# Patient Record
Sex: FEMALE | Race: ASIAN | NOT HISPANIC OR LATINO | Employment: FULL TIME | URBAN - METROPOLITAN AREA
[De-identification: names, ages, dates, MRNs, and addresses within clinical notes are randomized per-mention and may not be internally consistent; named-entity substitution may affect disease eponyms.]

---

## 2017-03-01 ENCOUNTER — GENERIC CONVERSION - ENCOUNTER (OUTPATIENT)
Dept: OTHER | Facility: OTHER | Age: 47
End: 2017-03-01

## 2017-05-03 ENCOUNTER — ALLSCRIPTS OFFICE VISIT (OUTPATIENT)
Dept: OTHER | Facility: OTHER | Age: 47
End: 2017-05-03

## 2017-05-03 DIAGNOSIS — R92.2 INCONCLUSIVE MAMMOGRAM: ICD-10-CM

## 2017-05-03 DIAGNOSIS — Z12.31 ENCOUNTER FOR SCREENING MAMMOGRAM FOR MALIGNANT NEOPLASM OF BREAST: ICD-10-CM

## 2017-05-13 ENCOUNTER — GENERIC CONVERSION - ENCOUNTER (OUTPATIENT)
Dept: OTHER | Facility: OTHER | Age: 47
End: 2017-05-13

## 2017-05-14 LAB
A/G RATIO (HISTORICAL): 1.7 (ref 1.2–2.2)
ALBUMIN SERPL BCP-MCNC: 4.3 G/DL (ref 3.5–5.5)
ALP SERPL-CCNC: 49 IU/L (ref 39–117)
ALT SERPL W P-5'-P-CCNC: 14 IU/L (ref 0–32)
AST SERPL W P-5'-P-CCNC: 14 IU/L (ref 0–40)
BASOPHILS # BLD AUTO: 0.1 X10E3/UL (ref 0–0.2)
BASOPHILS # BLD AUTO: 2 %
BILIRUB SERPL-MCNC: 0.5 MG/DL (ref 0–1.2)
BUN SERPL-MCNC: 15 MG/DL (ref 6–24)
BUN/CREA RATIO (HISTORICAL): 22 (ref 9–23)
CALCIUM SERPL-MCNC: 8.8 MG/DL (ref 8.7–10.2)
CHLORIDE SERPL-SCNC: 103 MMOL/L (ref 96–106)
CHOLEST SERPL-MCNC: 170 MG/DL (ref 100–199)
CO2 SERPL-SCNC: 22 MMOL/L (ref 18–29)
CREAT SERPL-MCNC: 0.67 MG/DL (ref 0.57–1)
DEPRECATED RDW RBC AUTO: 13.6 % (ref 12.3–15.4)
EGFR AFRICAN AMERICAN (HISTORICAL): 122 ML/MIN/1.73
EGFR-AMERICAN CALC (HISTORICAL): 106 ML/MIN/1.73
EOSINOPHIL # BLD AUTO: 0.3 X10E3/UL (ref 0–0.4)
EOSINOPHIL # BLD AUTO: 6 %
GLUCOSE SERPL-MCNC: 104 MG/DL (ref 65–99)
HBA1C MFR BLD HPLC: 6.1 % (ref 4.8–5.6)
HCT VFR BLD AUTO: 39.8 % (ref 34–46.6)
HDLC SERPL-MCNC: 78 MG/DL
HGB BLD-MCNC: 13.1 G/DL (ref 11.1–15.9)
IMM.GRANULOCYTES (CD4/8) (HISTORICAL): 0 %
IMM.GRANULOCYTES (CD4/8) (HISTORICAL): 0 X10E3/UL (ref 0–0.1)
LDLC SERPL CALC-MCNC: 80 MG/DL (ref 0–99)
LYMPHOCYTES # BLD AUTO: 1.8 X10E3/UL (ref 0.7–3.1)
LYMPHOCYTES # BLD AUTO: 39 %
MCH RBC QN AUTO: 30.8 PG (ref 26.6–33)
MCHC RBC AUTO-ENTMCNC: 32.9 G/DL (ref 31.5–35.7)
MCV RBC AUTO: 94 FL (ref 79–97)
MONOCYTES # BLD AUTO: 0.3 X10E3/UL (ref 0.1–0.9)
MONOCYTES (HISTORICAL): 6 %
NEUTROPHILS # BLD AUTO: 2.2 X10E3/UL (ref 1.4–7)
NEUTROPHILS # BLD AUTO: 47 %
PLATELET # BLD AUTO: 264 X10E3/UL (ref 150–379)
POTASSIUM SERPL-SCNC: 3.9 MMOL/L (ref 3.5–5.2)
RBC (HISTORICAL): 4.25 X10E6/UL (ref 3.77–5.28)
SODIUM SERPL-SCNC: 141 MMOL/L (ref 134–144)
TOT. GLOBULIN, SERUM (HISTORICAL): 2.5 G/DL (ref 1.5–4.5)
TOTAL PROTEIN (HISTORICAL): 6.8 G/DL (ref 6–8.5)
TRIGL SERPL-MCNC: 61 MG/DL (ref 0–149)
TSH SERPL DL<=0.05 MIU/L-ACNC: 2.2 UIU/ML (ref 0.45–4.5)
WBC # BLD AUTO: 4.7 X10E3/UL (ref 3.4–10.8)

## 2017-05-15 LAB
25(OH)D3 SERPL-MCNC: 24.2 NG/ML (ref 30–100)
INTERPRETATION (HISTORICAL): NORMAL

## 2017-05-16 ENCOUNTER — GENERIC CONVERSION - ENCOUNTER (OUTPATIENT)
Dept: OTHER | Facility: OTHER | Age: 47
End: 2017-05-16

## 2017-05-16 ENCOUNTER — HOSPITAL ENCOUNTER (OUTPATIENT)
Dept: RADIOLOGY | Facility: HOSPITAL | Age: 47
Discharge: HOME | End: 2017-05-16
Payer: COMMERCIAL

## 2017-05-16 DIAGNOSIS — R92.2 INCONCLUSIVE MAMMOGRAM: ICD-10-CM

## 2017-05-16 DIAGNOSIS — Z12.31 ENCOUNTER FOR SCREENING MAMMOGRAM FOR MALIGNANT NEOPLASM OF BREAST: ICD-10-CM

## 2017-05-16 PROCEDURE — G0202 SCR MAMMO BI INCL CAD: HCPCS

## 2017-05-16 PROCEDURE — 77063 BREAST TOMOSYNTHESIS BI: CPT

## 2017-05-17 ENCOUNTER — GENERIC CONVERSION - ENCOUNTER (OUTPATIENT)
Dept: OTHER | Facility: OTHER | Age: 47
End: 2017-05-17

## 2018-01-14 VITALS
WEIGHT: 111 LBS | TEMPERATURE: 98 F | DIASTOLIC BLOOD PRESSURE: 64 MMHG | HEIGHT: 60 IN | SYSTOLIC BLOOD PRESSURE: 120 MMHG | HEART RATE: 78 BPM | RESPIRATION RATE: 18 BRPM | BODY MASS INDEX: 21.79 KG/M2

## 2018-01-15 NOTE — RESULT NOTES
Discussion/Summary   Didier Crespo,   Your Vitamin D level is still low, but better than last time  I recommend starting Vitamin D3 2000 units a day  Fasting sugar and hemoglobin A1c are still in the prediabetic range  Kidney function, liver enzymes, blood count and cholesterol are normal    Dr Deb Quispe      Verified Results  (1) VITAMIN D 25-HYDROXY [id number] 07:55AM Vivian [place]     Test Name Result Flag Reference   Vitamin D, 25-Hydroxy 24 2 ng/mL L 30 0-100 0   Vitamin D deficiency has been defined by the [address] practice guideline as a  level of serum 25-OH vitamin D less than 20 ng/mL (1,2)  The Endocrine Society went on to further define vitamin D  insufficiency as a level between 21 and 29 ng/mL (2)  1  IOM ([place] [employer])  2010  Dietary reference     intakes for calcium and D  [facility]: The     [facility]  2  Kathie MF, Linda BEST, Katie KAPOOR, et al      Evaluation, treatment, and prevention of vitamin D     deficiency: an Endocrine Society clinical practice     guideline  JCEM  2011 Jul; 96(7):1911-30       (1) HEMOGLOBIN A1C [id number] 07:55AM Vivian [place]     Test Name Result Flag Reference   Hemoglobin A1c 6 1 % H 4 8-5 6   Pre-diabetes: 5 7 - 6 4           Diabetes: >6 4           Glycemic control for adults with diabetes: <7 0     (1) COMPREHENSIVE METABOLIC PANEL [id number] 83:58QL Vivian [place]     Test Name Result Flag Reference   Glucose, Serum 104 mg/dL H 65-99   BUN 15 mg/dL  6-24   Creatinine, Serum 0 67 mg/dL  0 57-1 00   BUN/Creatinine Ratio 22  9-23   Sodium, Serum 141 mmol/L  134-144   Potassium, Serum 3 9 mmol/L  3 5-5 2   Chloride, Serum 103 mmol/L     Carbon Dioxide, Total 22 mmol/L  18-29   Calcium, Serum 8 8 mg/dL  8 7-10 2   Protein, Total, Serum 6 8 g/dL  6 0-8 5   Albumin, Serum 4 3 g/dL  3 5-5 5   Globulin, Total 2 5 g/dL  1 5-4 5   A/G Ratio 1 7  1 2-2 2   Bilirubin, Total 0 5 mg/dL  0 0-1 2   Alkaline Phosphatase, S 49 IU/L     AST (SGOT) 14 IU/L  0-40   ALT (SGPT) 14 IU/L  0-32   eGFR If NonAfricn Am 106 mL/min/1 73  >59   eGFR If Africn Am 122 mL/min/1 73  >59     (1) LIPID PANEL FASTING W DIRECT LDL REFLEX [id number] 07:55AM [facility]     Test Name Result Flag Reference   Cholesterol, Total 170 mg/dL  100-199   Triglycerides 61 mg/dL  0-149   HDL Cholesterol 78 mg/dL  >39   LDL Cholesterol Calc 80 mg/dL  0-99     (1) TSH [id number] 07:55AM [facility]     Test Name Result Flag Reference   TSH 2 200 uIU/mL  0 450-4 500     (1) CBC/PLT/DIFF [id number] 07:55AM [facility]     Test Name Result Flag Reference   WBC 4 7 x10E3/uL  3 4-10 8   RBC 4 25 x10E6/uL  3 77-5 28   Hemoglobin 13 1 g/dL  11 1-15 9   Hematocrit 39 8 %  34 0-46  6   MCV 94 fL  79-97   MCH 30 8 pg  26 6-33 0   MCHC 32 9 g/dL  31 5-35 7   RDW 13 6 %  12 3-15 4   Platelets 696 H35U0/VU  150-379   Neutrophils 47 %     Lymphs 39 %     Monocytes 6 %     Eos 6 %     Basos 2 %     Neutrophils (Absolute) 2 2 x10E3/uL  1 4-7 0   Lymphs (Absolute) 1 8 x10E3/uL  0 7-3 1   Monocytes(Absolute) 0 3 x10E3/uL  0 1-0 9   Eos (Absolute) 0 3 x10E3/uL  0 0-0 4   Baso (Absolute) 0 1 x10E3/uL  0 0-0 2   Immature Granulocytes 0 %     Immature Grans (Abs) 0 0 x10E3/uL  0 0-0 1     (LC) Cardiovascular Risk Assessment [id number] 07:55AM [facility]     Test Name Result Flag Reference   Interpretation Note     Supplement report is available  PDF Image

## 2018-01-15 NOTE — RESULT NOTES
Discussion/Summary   Minerva Denver,   Your mammogram is normal    Dr Nasreen Palencia [id number] 02:55PM Peg Zamora Order Number: [id number]    - Patient Instructions: To schedule this appointment, please contact Central Scheduling at [phone]  Do not wear any perfume, powder, lotion or deodorant on breast or underarm area  Please bring your doctors order, referral (if needed) and insurance information with you on the day of the test  Failure to bring this information may result in this test being rescheduled  Arrive 15 minutes prior to your appointment time to register  On the day of your test, please bring any prior mammogram or breast studies with you that were not performed at a [facility]  Failure to bring prior exams may result in your test needing to be rescheduled  Test Name Result Flag Reference   MAMMO SCREENING BILATERAL W 3D & CAD (Report)     Patient History:   Patient is nulliparous  Family history of prostate cancer at age 48 or over in father  Patient has never smoked  Patient's BMI is 21 0  Reason for exam: screening, asymptomatic  Screening     Mammo Screening Bilateral W DBT and CAD: May 16, 2017 - Check In    #: [de-identified]   2D/3D Procedure   3D Bilateral CC and MLO view(s) were taken  2D Bilateral CC and MLO view(s) were taken  Technologist: MAXIMINO Harding   Prior study comparison: December 30, 2014, bilateral screening    mammogram performed at [facility]  The breast tissue is heterogeneously dense, potentially limiting    the sensitivity of mammography  Patient risk, included in this    report, assists in determining the appropriate screening regimen    (such as 3-D mammography or the inclusion of automated breast    ultrasound or MRI)  3-D mammography may also remain indicated as    screening   No dominant soft tissue mass, architectural    distortion or suspicious calcifications are noted  The skin and    nipple contours are within normal limits  No evidence of    malignancy  No significant change when compared to the prior    studies  1  No evidence of malignancy  2  No significant change when compared with the prior study  ACR BI-RADSï¾® Assessments: BiRad:1 - Negative     Recommendation:   Routine screening mammogram of both breasts in 1 year  A reminder letter will be scheduled   8-10% of cancers will be missed on mammography  Management of a    palpable abnormality must be based on clinical grounds  Patients    will be notified of their results via letter from our facility  Accredited by [employer] of [employer] and FDA       Transcription Location: [facility] 98: [id number]     Risk Value(s):   Tyrer-Cuzick 10 Year: 2 300%, Tyrer-Cuzick Lifetime: 11 700%,    Myriad Table: 1 5%, SARAH 5 Year: 0 9%, NCI Lifetime: 9 6%   Signed by:   Barb Daly MD   5/17/17

## 2018-01-23 ENCOUNTER — ALLSCRIPTS OFFICE VISIT (OUTPATIENT)
Dept: OTHER | Facility: OTHER | Age: 48
End: 2018-01-23

## 2018-01-23 DIAGNOSIS — D25.9 LEIOMYOMA OF UTERUS: ICD-10-CM

## 2018-01-23 PROCEDURE — 87624 HPV HI-RISK TYP POOLED RSLT: CPT | Performed by: OBSTETRICS & GYNECOLOGY

## 2018-01-23 PROCEDURE — 88175 CYTOPATH C/V AUTO FLUID REDO: CPT | Performed by: OBSTETRICS & GYNECOLOGY

## 2018-01-24 NOTE — PROGRESS NOTES
Assessment   1  Encounter for gynecological examination with Papanicolaou smear of cervix (V72 31)     (Z01 419)   2  Fibroid, uterine (218 9) (D25 9)    Discussion/Summary      1  Pap smear done with HPV Encouraged self-breast examination as well as calcium supplementation Recommend annual 3D mammogram given breast density Recommend pelvic ultrasound to assess fibroid uterus Reviewed claudia menopausal symptoms  She will keep a menstrual diary Return to office in 1 year  I will notify her the above results via telephone  The patient has the current Goals: Continue preventative screening, monitor menstrual cycles  The patent has the current Barriers: No barriers  Chief Complaint   annual visit      History of Present Illness   HPI: This is a 80-year-old female G0 who presents as a new patient for a nnual well gyn exam  She states her last gyn exam was a couple of years ago  She states all her Pap smears have been normal  Her menstrual cycles have been regular every 4 weeks lasting 5-6 days up until October  She states that she had skipped November and December and then had a normal period in January  She denies any hot flashes or night sweats  She denies any changes in bowel or bladder function  She did have an episode of irregular bleeding in July 2017  She was visiting in [place] for 1 month and underwent an evaluation  She had a pelvic ultrasound done which revealed a multi fibroid uterus, largest 4 x 3 cm  All intramural fibroids  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  has not been sexually active for several years  She states all her Pap smears have been normal  She does follow up with her primary care physician on a regular basis  Her hemoglobin A1c is 6 2  She states there is a strong family history of diabetes        Review of Systems        Cardiovascular: no complaints of slow or fast heart rate, no chest pain, no palpitations, no leg claudication or lower extremity edema  Respiratory: no complaints of shortness of breath, no wheezing, no dyspnea on exertion, no orthopnea or PND  Breasts: no complaints of breast pain, breast lump or nipple discharge  Gastrointestinal: no complaints of abdominal pain, no constipation, no nausea or diarrhea, no vomiting, no bloody stools  Genitourinary: no complaints of dysuria, no incontinence, no pelvic pain, no dysmenorrhea, no vaginal discharge or abnormal vaginal bleeding-- and-- as noted in HPI  Over the past 2 weeks, how often have you been bothered by the following problems? 1 ) Little interest or pleasure in doing things? Not at all       2 ) Feeling down, depressed or hopeless? Not at all       3 ) Trouble falling asleep or sleeping too much? Not at all       4 ) Feeling tired or having little energy? Several days  5 ) Poor appetite or overeating? Not at all       6 ) Feeling bad about yourself, or that you are a failure, or have let yourself or your family down? Not at all       7 ) Trouble concentrating on things, such as reading a newspaper or watching television? Not at all       8 ) Moving or speaking so slowly that other people could have noticed, or the opposite, moving or speaking faster than usual? Not at all  How difficult have these problems made it for you to do your work, take care of things at home, or get along with people? Not at all  Score 1       ROS reviewed  OB History   Pregnancy History (Brief):      Prior pregnancies: : 0  Para: Active Problems   1  Allergic rhinitis due to pollen (477 0) (J30 1)   2  Body mass index (BMI) of 21 0 to 21 9 in adult (V85 1) (Z68 21)   3  Bulging lumbar disc (722 10) (M51 26)   4  Dense breasts (793 82) (R92 2)   5  Encounter for gynecological examination with Papanicolaou smear of cervix (V72 31)     (Z01 419)   6  Esophageal reflux (530 81) (K21 9)   7  Fatigue (780 79) (R53 83)   8   Impaired fasting glucose (790 21) (R73 01)   9  Positive PPD (795 51) (R76 11)   10  Screening for cardiovascular condition (V81 2) (Z13 6)   11  Screening for thyroid disorder (V77 0) (Z13 29)   12  Screening mammogram, encounter for (V76 12) (Z12 31)   13  Vitamin D deficiency (268 9) (E55 9)    Past Medical History    · History of Cough (786 2) (R05)   · History of acute sinusitis (V12 69) (Z87 09)   · History of backache (V13 59) (Z87 39)   · History of folliculitis (Y73 6) (Z47 8)   · History of viral infection (V12 09) (Z86 19)   · History of Screening for diabetes mellitus (V77 1) (Z13 1)   · History of Sore throat (462) (J02 9)     The active problems and past medical history were reviewed and updated today  Surgical History    · History of Dental Surgery     The surgical history was reviewed and updated today  Family History   Mother    · Family history of Benign essential hypertension (401 1) (I10)   · Family history of Diabetes Mellitus (V18 0)  Father    · Family history of Benign Essential Hypertension   · Family history of Diabetes Mellitus (V18 0)   · Family history of Liver Disorders In Diseases Classified Elsewhere   · Family history of Tuberculosis     The family history was reviewed and updated today  Social History    · Alcohol Use (History)   ·    · Never a smoker   · Occupation   · Nurse anesthetist, [employer]  The social history was reviewed and updated today  Current Meds    1  Daily Value Multivitamin TABS; Take 1 tablet daily Recorded    Allergies   1  No Known Drug Allergies    Vitals    Recorded: 21DLT9644 07:52KJ   Systolic 222   Diastolic 70   Height 5 ft    Weight 114 lb    BMI Calculated 22 26   BSA Calculated 1 47   LMP [id number]     Physical Exam        Constitutional      General appearance: No acute distress, well appearing and well nourished  Pulmonary      Auscultation of lungs: Clear to auscultation         Cardiovascular      Auscultation of heart: Normal rate and rhythm, normal S1 and S2, no murmurs  Genitourinary      External genitalia: Normal and no lesions appreciated  Vagina: Normal, no lesions or dryness appreciated  Urethral meatus: Normal        Cervix: Normal, no palpable masses  Uterus: Abnormal  -- Multi fibroid uterus, irregular in contour, approximately 13 week size uterus deviating to the right adnexa  Adnexa/parametria: Normal, non-tender and no fullness or masses appreciated  Chest      Breasts: Normal and no dimpling or skin changes noted  Abdomen      Abdomen: Normal, non-tender, and no organomegaly noted         Signatures    Electronically signed by : Norma Glasgow DO; Jan 23 2018  4:16PM EST                       (Author)

## 2018-01-26 ENCOUNTER — LAB REQUISITION (OUTPATIENT)
Dept: LAB | Facility: HOSPITAL | Age: 48
End: 2018-01-26
Payer: COMMERCIAL

## 2018-01-26 DIAGNOSIS — Z01.419 ENCOUNTER FOR GYNECOLOGICAL EXAMINATION WITHOUT ABNORMAL FINDING: ICD-10-CM

## 2018-01-29 LAB — HPV RRNA GENITAL QL NAA+PROBE: NORMAL

## 2018-01-30 LAB
LAB AP GYN PRIMARY INTERPRETATION: NORMAL
Lab: NORMAL

## 2018-02-07 ENCOUNTER — HOSPITAL ENCOUNTER (OUTPATIENT)
Dept: RADIOLOGY | Facility: HOSPITAL | Age: 48
Discharge: HOME | End: 2018-02-07
Payer: COMMERCIAL

## 2018-02-07 DIAGNOSIS — D25.9 LEIOMYOMA OF UTERUS: ICD-10-CM

## 2018-02-07 PROCEDURE — 76830 TRANSVAGINAL US NON-OB: CPT

## 2018-02-07 PROCEDURE — 76856 US EXAM PELVIC COMPLETE: CPT

## 2018-03-22 ENCOUNTER — TELEPHONE (OUTPATIENT)
Dept: FAMILY MEDICINE CLINIC | Facility: CLINIC | Age: 48
End: 2018-03-22

## 2018-03-22 DIAGNOSIS — Z13.6 SCREENING FOR CARDIOVASCULAR CONDITION: ICD-10-CM

## 2018-03-22 DIAGNOSIS — R73.01 IMPAIRED FASTING GLUCOSE: ICD-10-CM

## 2018-03-22 DIAGNOSIS — R53.83 FATIGUE, UNSPECIFIED TYPE: ICD-10-CM

## 2018-03-22 DIAGNOSIS — E55.9 VITAMIN D DEFICIENCY: Primary | ICD-10-CM

## 2018-03-22 PROBLEM — R92.2 DENSE BREASTS: Status: ACTIVE | Noted: 2017-05-03

## 2018-03-22 PROBLEM — R92.30 DENSE BREASTS: Status: ACTIVE | Noted: 2017-05-03

## 2018-03-22 PROBLEM — D25.9 FIBROID, UTERINE: Status: ACTIVE | Noted: 2018-01-23

## 2018-03-22 NOTE — TELEPHONE ENCOUNTER
Patient is requesting routine yearly bloodwork  Please include A1C and thyroid testing on the lab order      Please call patient when order is ready   [phone]

## 2018-03-22 NOTE — TELEPHONE ENCOUNTER
Dr Zaldivar Fus  Patient is calling to see why we did not order a thyroid test for her? She said she specifically asked for it? Please advise

## 2018-03-23 NOTE — TELEPHONE ENCOUNTER
3/23/2018 12:19 PM Wrote a new order for thyroid labs and I called Nadir Beard and let her know they were ready  She will pick them up today    Estee Miller, DO

## 2018-03-26 LAB
25(OH)D3+25(OH)D2 SERPL-MCNC: 18.6 NG/ML (ref 30–100)
ALBUMIN SERPL-MCNC: 4.1 G/DL (ref 3.5–5.5)
ALBUMIN/GLOB SERPL: 1.4 {RATIO} (ref 1.2–2.2)
ALP SERPL-CCNC: 44 IU/L (ref 39–117)
ALT SERPL-CCNC: 18 IU/L (ref 0–32)
AMBIG ABBREV DEFAULT: NORMAL
AST SERPL-CCNC: 20 IU/L (ref 0–40)
BASOPHILS # BLD AUTO: 0 X10E3/UL (ref 0–0.2)
BASOPHILS NFR BLD AUTO: 1 %
BILIRUB SERPL-MCNC: 0.3 MG/DL (ref 0–1.2)
BUN SERPL-MCNC: 13 MG/DL (ref 6–24)
BUN/CREAT SERPL: 22 (ref 9–23)
CALCIUM SERPL-MCNC: 8.9 MG/DL (ref 8.7–10.2)
CHLORIDE SERPL-SCNC: 102 MMOL/L (ref 96–106)
CHOLEST SERPL-MCNC: 191 MG/DL (ref 100–199)
CO2 SERPL-SCNC: 22 MMOL/L (ref 18–29)
CREAT SERPL-MCNC: 0.6 MG/DL (ref 0.57–1)
EOSINOPHIL # BLD AUTO: 0.1 X10E3/UL (ref 0–0.4)
EOSINOPHIL NFR BLD AUTO: 1 %
ERYTHROCYTE [DISTWIDTH] IN BLOOD BY AUTOMATED COUNT: 15.2 % (ref 12.3–15.4)
EST. AVERAGE GLUCOSE BLD GHB EST-MCNC: 123 MG/DL
GLOBULIN SER-MCNC: 2.9 G/DL (ref 1.5–4.5)
GLUCOSE SERPL-MCNC: 96 MG/DL (ref 65–99)
HBA1C MFR BLD: 5.9 % (ref 4.8–5.6)
HCT VFR BLD AUTO: 39.8 % (ref 34–46.6)
HDLC SERPL-MCNC: 75 MG/DL
HGB BLD-MCNC: 13.5 G/DL (ref 11.1–15.9)
IMM GRANULOCYTES # BLD: 0 X10E3/UL (ref 0–0.1)
IMM GRANULOCYTES NFR BLD: 0 %
LDLC SERPL CALC-MCNC: 101 MG/DL (ref 0–99)
LYMPHOCYTES # BLD AUTO: 1.9 X10E3/UL (ref 0.7–3.1)
LYMPHOCYTES NFR BLD AUTO: 40 %
MCH RBC QN AUTO: 31.5 PG (ref 26.6–33)
MCHC RBC AUTO-ENTMCNC: 33.9 G/DL (ref 31.5–35.7)
MCV RBC AUTO: 93 FL (ref 79–97)
MICRODELETION SYND BLD/T FISH: NORMAL
MONOCYTES # BLD AUTO: 0.3 X10E3/UL (ref 0.1–0.9)
MONOCYTES NFR BLD AUTO: 6 %
NEUTROPHILS # BLD AUTO: 2.5 X10E3/UL (ref 1.4–7)
NEUTROPHILS NFR BLD AUTO: 52 %
PLATELET # BLD AUTO: 275 X10E3/UL (ref 150–379)
POTASSIUM SERPL-SCNC: 4.3 MMOL/L (ref 3.5–5.2)
PROT SERPL-MCNC: 7 G/DL (ref 6–8.5)
RBC # BLD AUTO: 4.29 X10E6/UL (ref 3.77–5.28)
SL AMB EGFR AFRICAN AMERICAN: 126 ML/MIN/1.73
SL AMB EGFR NON AFRICAN AMERICAN: 110 ML/MIN/1.73
SODIUM SERPL-SCNC: 139 MMOL/L (ref 134–144)
T4 FREE SERPL-MCNC: 1.24 NG/DL (ref 0.82–1.77)
TRIGL SERPL-MCNC: 75 MG/DL (ref 0–149)
TSH SERPL DL<=0.005 MIU/L-ACNC: 1.48 UIU/ML (ref 0.45–4.5)
WBC # BLD AUTO: 4.8 X10E3/UL (ref 3.4–10.8)

## 2018-06-19 ENCOUNTER — OFFICE VISIT (OUTPATIENT)
Dept: FAMILY MEDICINE CLINIC | Facility: CLINIC | Age: 48
End: 2018-06-19
Payer: COMMERCIAL

## 2018-06-19 VITALS
DIASTOLIC BLOOD PRESSURE: 54 MMHG | TEMPERATURE: 98.6 F | SYSTOLIC BLOOD PRESSURE: 86 MMHG | WEIGHT: 116 LBS | HEIGHT: 60 IN | BODY MASS INDEX: 22.78 KG/M2 | HEART RATE: 84 BPM | RESPIRATION RATE: 16 BRPM

## 2018-06-19 DIAGNOSIS — E55.9 VITAMIN D DEFICIENCY: ICD-10-CM

## 2018-06-19 DIAGNOSIS — R73.01 IMPAIRED FASTING GLUCOSE: Primary | ICD-10-CM

## 2018-06-19 DIAGNOSIS — Z12.31 SCREENING MAMMOGRAM, ENCOUNTER FOR: ICD-10-CM

## 2018-06-19 DIAGNOSIS — R92.2 DENSE BREASTS: ICD-10-CM

## 2018-06-19 PROCEDURE — 3008F BODY MASS INDEX DOCD: CPT | Performed by: FAMILY MEDICINE

## 2018-06-19 PROCEDURE — 1036F TOBACCO NON-USER: CPT | Performed by: FAMILY MEDICINE

## 2018-06-19 PROCEDURE — 99213 OFFICE O/P EST LOW 20 MIN: CPT | Performed by: FAMILY MEDICINE

## 2018-06-19 RX ORDER — ERGOCALCIFEROL (VITAMIN D2) 1250 MCG
50000 CAPSULE ORAL WEEKLY
Qty: 12 CAPSULE | Refills: 0 | Status: SHIPPED | OUTPATIENT
Start: 2018-06-19 | End: 2018-10-01 | Stop reason: SDUPTHER

## 2018-06-19 NOTE — ASSESSMENT & PLAN NOTE
Still at risk for diabetes  Hemoglobin A1C   Date Value Ref Range Status   03/24/2018 5 9 (H) 4 8 - 5 6 % Final     Comment:              Pre-diabetes: 5 7 - 6 4           Diabetes: >6 4           Glycemic control for adults with diabetes: <7 0

## 2018-06-19 NOTE — PROGRESS NOTES
Assessment/Plan:    Problem List Items Addressed This Visit     Dense breasts    Relevant Orders    Mammo screening bilateral w 3d & cad    Impaired fasting glucose - Primary     Still at risk for diabetes  Hemoglobin A1C   Date Value Ref Range Status   03/24/2018 5 9 (H) 4 8 - 5 6 % Final     Comment:              Pre-diabetes: 5 7 - 6 4           Diabetes: >6 4           Glycemic control for adults with diabetes: <7 0                Relevant Orders    Comprehensive metabolic panel    Hemoglobin A1C    Vitamin D deficiency     Level was 18  Will recheck in 6 months         Relevant Medications    ergocalciferol (ERGOCALCIFEROL) 65527 units capsule    Other Relevant Orders    Vitamin D 25 hydroxy      Other Visit Diagnoses     Screening mammogram, encounter for        Relevant Orders    Mammo screening bilateral w 3d & cad        Home stool test for heme occult given     There are no Patient Instructions on file for this visit  Return in about 6 months (around 12/19/2018) for Next scheduled follow up  Subjective:      Patient ID: Zaida Regan is a 53 y o  female  Chief Complaint   Patient presents with    Follow-up     review labs from [facility]       She has moved to [place]  She is looking to start exercising again  She has been feeling tired  She went to a episode of not eating well and traveling a lot  She notes that she has been trying to eat better and stay more active for over the past month  The following portions of the patient's history were reviewed and updated as appropriate:  past social history    Review of Systems   Constitutional: Positive for fatigue  Respiratory: Negative  Current Outpatient Prescriptions   Medication Sig Dispense Refill    ergocalciferol (ERGOCALCIFEROL) 60553 units capsule Take 1 capsule (50,000 Units total) by mouth once a week for 84 days 12 capsule 0     No current facility-administered medications for this visit          Objective:    BP (!) 86/54 Pulse 84   Temp 98 6 °F (37 °C)   Resp 16   Ht 5' (1 524 m)   Wt 52 6 kg (116 lb)   LMP 06/01/2018   BMI 22 65 kg/m²        Physical Exam   Constitutional: She appears well-developed and well-nourished  HENT:   Head: Normocephalic and atraumatic  Right Ear: External ear normal    Left Ear: External ear normal    Mouth/Throat: Oropharynx is clear and moist    Cardiovascular: Normal rate, regular rhythm and normal heart sounds  Exam reveals no friction rub  No murmur heard  Pulmonary/Chest: Effort normal and breath sounds normal  No respiratory distress  She has no wheezes  She has no rales  Musculoskeletal: She exhibits no edema or deformity  Nursing note and vitals reviewed               Diana Cortes DO

## 2018-07-09 ENCOUNTER — HOSPITAL ENCOUNTER (OUTPATIENT)
Dept: RADIOLOGY | Facility: HOSPITAL | Age: 48
Discharge: HOME | End: 2018-07-09
Payer: COMMERCIAL

## 2018-07-09 DIAGNOSIS — R92.2 DENSE BREASTS: ICD-10-CM

## 2018-07-09 DIAGNOSIS — Z12.31 SCREENING MAMMOGRAM, ENCOUNTER FOR: ICD-10-CM

## 2018-07-09 PROCEDURE — 77063 BREAST TOMOSYNTHESIS BI: CPT

## 2018-07-09 PROCEDURE — 77067 SCR MAMMO BI INCL CAD: CPT

## 2018-10-01 DIAGNOSIS — E55.9 VITAMIN D DEFICIENCY: ICD-10-CM

## 2018-10-01 RX ORDER — ERGOCALCIFEROL 1.25 MG/1
CAPSULE ORAL
Qty: 12 CAPSULE | Refills: 0 | Status: SHIPPED | OUTPATIENT
Start: 2018-10-01 | End: 2018-12-17 | Stop reason: SDUPTHER

## 2018-12-09 LAB
25(OH)D3+25(OH)D2 SERPL-MCNC: 27.2 NG/ML (ref 30–100)
ALBUMIN SERPL-MCNC: 4 G/DL (ref 3.5–5.5)
ALBUMIN/GLOB SERPL: 1.7 {RATIO} (ref 1.2–2.2)
ALP SERPL-CCNC: 46 IU/L (ref 39–117)
ALT SERPL-CCNC: 16 IU/L (ref 0–32)
AST SERPL-CCNC: 18 IU/L (ref 0–40)
BILIRUB SERPL-MCNC: 0.2 MG/DL (ref 0–1.2)
BUN SERPL-MCNC: 15 MG/DL (ref 6–24)
BUN/CREAT SERPL: 24 (ref 9–23)
CALCIUM SERPL-MCNC: 8.7 MG/DL (ref 8.7–10.2)
CHLORIDE SERPL-SCNC: 103 MMOL/L (ref 96–106)
CO2 SERPL-SCNC: 21 MMOL/L (ref 20–29)
CREAT SERPL-MCNC: 0.63 MG/DL (ref 0.57–1)
EST. AVERAGE GLUCOSE BLD GHB EST-MCNC: 126 MG/DL
GLOBULIN SER-MCNC: 2.4 G/DL (ref 1.5–4.5)
GLUCOSE SERPL-MCNC: 104 MG/DL (ref 65–99)
HBA1C MFR BLD: 6 % (ref 4.8–5.6)
POTASSIUM SERPL-SCNC: 4.3 MMOL/L (ref 3.5–5.2)
PROT SERPL-MCNC: 6.4 G/DL (ref 6–8.5)
SL AMB EGFR AFRICAN AMERICAN: 124 ML/MIN/1.73
SL AMB EGFR NON AFRICAN AMERICAN: 107 ML/MIN/1.73
SODIUM SERPL-SCNC: 137 MMOL/L (ref 134–144)

## 2018-12-17 ENCOUNTER — OFFICE VISIT (OUTPATIENT)
Dept: FAMILY MEDICINE CLINIC | Facility: CLINIC | Age: 48
End: 2018-12-17
Payer: COMMERCIAL

## 2018-12-17 ENCOUNTER — TELEPHONE (OUTPATIENT)
Dept: FAMILY MEDICINE CLINIC | Facility: CLINIC | Age: 48
End: 2018-12-17

## 2018-12-17 VITALS
SYSTOLIC BLOOD PRESSURE: 112 MMHG | RESPIRATION RATE: 14 BRPM | HEIGHT: 61 IN | BODY MASS INDEX: 22.69 KG/M2 | TEMPERATURE: 97.7 F | WEIGHT: 120.2 LBS | DIASTOLIC BLOOD PRESSURE: 72 MMHG | HEART RATE: 60 BPM

## 2018-12-17 DIAGNOSIS — Z79.899 ENCOUNTER FOR LONG-TERM CURRENT USE OF MEDICATION: ICD-10-CM

## 2018-12-17 DIAGNOSIS — E55.9 VITAMIN D DEFICIENCY: ICD-10-CM

## 2018-12-17 DIAGNOSIS — R73.01 IMPAIRED FASTING GLUCOSE: Primary | ICD-10-CM

## 2018-12-17 DIAGNOSIS — Z13.6 SCREENING FOR CARDIOVASCULAR CONDITION: ICD-10-CM

## 2018-12-17 PROCEDURE — 3008F BODY MASS INDEX DOCD: CPT | Performed by: FAMILY MEDICINE

## 2018-12-17 PROCEDURE — 99213 OFFICE O/P EST LOW 20 MIN: CPT | Performed by: FAMILY MEDICINE

## 2018-12-17 PROCEDURE — 1036F TOBACCO NON-USER: CPT | Performed by: FAMILY MEDICINE

## 2018-12-17 RX ORDER — ERGOCALCIFEROL 1.25 MG/1
CAPSULE ORAL
Qty: 12 CAPSULE | Refills: 1 | Status: SHIPPED | OUTPATIENT
Start: 2018-12-17 | End: 2019-07-15 | Stop reason: ALTCHOICE

## 2018-12-17 NOTE — PROGRESS NOTES
Assessment/Plan:    Problem List Items Addressed This Visit     Impaired fasting glucose - Primary     A1c is up to 6 0         Relevant Orders    Hemoglobin A1C    Comprehensive metabolic panel      Other Visit Diagnoses     Encounter for long-term current use of medication        Relevant Orders    TSH, 3rd generation    T4, free    CBC    Screening for cardiovascular condition        Relevant Orders    Lipid Panel with Direct LDL reflex          There are no Patient Instructions on file for this visit  Return in about 6 months (around 6/17/2019) for Annual physical     Subjective:      Patient ID: Jackie Christianson is a 53 y o  female  Chief Complaint   Patient presents with    Follow-up     6 month follow up  af/rma        She is feeling well  She is not exercising  She has not watched her diet  The following portions of the patient's history were reviewed and updated as appropriate:  past social history    Review of Systems   Respiratory: Negative  Cardiovascular: Negative  Current Outpatient Prescriptions   Medication Sig Dispense Refill    ergocalciferol (VITAMIN D2) 50,000 units take 1 capsule by mouth every week for 84 DAYS 12 capsule 0     No current facility-administered medications for this visit  Objective:    /72   Pulse 60   Temp 97 7 °F (36 5 °C)   Resp 14   Ht 5' 1" (1 549 m)   Wt 54 5 kg (120 lb 3 2 oz)   LMP 12/14/2018   BMI 22 71 kg/m²        Physical Exam   Constitutional: She appears well-developed and well-nourished  HENT:   Head: Normocephalic and atraumatic  Right Ear: External ear normal    Left Ear: External ear normal    Mouth/Throat: Oropharynx is clear and moist    Cardiovascular: Normal rate, regular rhythm and normal heart sounds  Exam reveals no friction rub  No murmur heard  Pulmonary/Chest: Effort normal and breath sounds normal  No respiratory distress  She has no wheezes  She has no rales     Musculoskeletal: She exhibits no edema or deformity  Nursing note and vitals reviewed               Silas Bailey DO

## 2019-06-19 DIAGNOSIS — E55.9 VITAMIN D DEFICIENCY: ICD-10-CM

## 2019-06-19 RX ORDER — ERGOCALCIFEROL 1.25 MG/1
CAPSULE ORAL
Qty: 12 CAPSULE | Refills: 1 | OUTPATIENT
Start: 2019-06-19

## 2019-06-19 NOTE — TELEPHONE ENCOUNTER
Patient informed labs sent to [address] [place] [address]  Patient requested that this be sent to her new address listed    Shira Cobos MA  No further action

## 2019-07-07 LAB
ALBUMIN SERPL-MCNC: 4.3 G/DL (ref 3.5–5.5)
ALBUMIN/GLOB SERPL: 1.7 {RATIO} (ref 1.2–2.2)
ALP SERPL-CCNC: 44 IU/L (ref 39–117)
ALT SERPL-CCNC: 10 IU/L (ref 0–32)
AST SERPL-CCNC: 15 IU/L (ref 0–40)
BASOPHILS # BLD AUTO: 0 X10E3/UL (ref 0–0.2)
BASOPHILS NFR BLD AUTO: 1 %
BILIRUB SERPL-MCNC: 0.5 MG/DL (ref 0–1.2)
BUN SERPL-MCNC: 13 MG/DL (ref 6–24)
BUN/CREAT SERPL: 19 (ref 9–23)
CALCIUM SERPL-MCNC: 9.2 MG/DL (ref 8.7–10.2)
CHLORIDE SERPL-SCNC: 102 MMOL/L (ref 96–106)
CHOLEST SERPL-MCNC: 216 MG/DL (ref 100–199)
CO2 SERPL-SCNC: 21 MMOL/L (ref 20–29)
CREAT SERPL-MCNC: 0.67 MG/DL (ref 0.57–1)
EOSINOPHIL # BLD AUTO: 0.1 X10E3/UL (ref 0–0.4)
EOSINOPHIL NFR BLD AUTO: 2 %
ERYTHROCYTE [DISTWIDTH] IN BLOOD BY AUTOMATED COUNT: 14.2 % (ref 12.3–15.4)
GLOBULIN SER-MCNC: 2.5 G/DL (ref 1.5–4.5)
GLUCOSE SERPL-MCNC: 98 MG/DL (ref 65–99)
HBA1C MFR BLD: 6.1 % (ref 4.8–5.6)
HCT VFR BLD AUTO: 38.9 % (ref 34–46.6)
HDLC SERPL-MCNC: 72 MG/DL
HGB BLD-MCNC: 13 G/DL (ref 11.1–15.9)
IMM GRANULOCYTES # BLD: 0 X10E3/UL (ref 0–0.1)
IMM GRANULOCYTES NFR BLD: 0 %
LABCORP COMMENT: NORMAL
LDLC SERPL CALC-MCNC: 133 MG/DL (ref 0–99)
LYMPHOCYTES # BLD AUTO: 1.8 X10E3/UL (ref 0.7–3.1)
LYMPHOCYTES NFR BLD AUTO: 45 %
MCH RBC QN AUTO: 30.3 PG (ref 26.6–33)
MCHC RBC AUTO-ENTMCNC: 33.4 G/DL (ref 31.5–35.7)
MCV RBC AUTO: 91 FL (ref 79–97)
MICRODELETION SYND BLD/T FISH: NORMAL
MONOCYTES # BLD AUTO: 0.3 X10E3/UL (ref 0.1–0.9)
MONOCYTES NFR BLD AUTO: 7 %
NEUTROPHILS # BLD AUTO: 1.8 X10E3/UL (ref 1.4–7)
NEUTROPHILS NFR BLD AUTO: 45 %
PLATELET # BLD AUTO: 275 X10E3/UL (ref 150–450)
POTASSIUM SERPL-SCNC: 4 MMOL/L (ref 3.5–5.2)
PROT SERPL-MCNC: 6.8 G/DL (ref 6–8.5)
RBC # BLD AUTO: 4.29 X10E6/UL (ref 3.77–5.28)
SL AMB EGFR AFRICAN AMERICAN: 120 ML/MIN/1.73
SL AMB EGFR NON AFRICAN AMERICAN: 104 ML/MIN/1.73
SODIUM SERPL-SCNC: 138 MMOL/L (ref 134–144)
T4 FREE SERPL-MCNC: 1.42 NG/DL (ref 0.82–1.77)
TRIGL SERPL-MCNC: 57 MG/DL (ref 0–149)
TSH SERPL DL<=0.005 MIU/L-ACNC: 1.98 UIU/ML (ref 0.45–4.5)
WBC # BLD AUTO: 4 X10E3/UL (ref 3.4–10.8)

## 2019-07-15 ENCOUNTER — OFFICE VISIT (OUTPATIENT)
Dept: FAMILY MEDICINE CLINIC | Facility: CLINIC | Age: 49
End: 2019-07-15
Payer: COMMERCIAL

## 2019-07-15 VITALS
SYSTOLIC BLOOD PRESSURE: 116 MMHG | RESPIRATION RATE: 16 BRPM | HEART RATE: 62 BPM | TEMPERATURE: 98.8 F | BODY MASS INDEX: 21.22 KG/M2 | HEIGHT: 61 IN | DIASTOLIC BLOOD PRESSURE: 68 MMHG | WEIGHT: 112.4 LBS

## 2019-07-15 DIAGNOSIS — R92.2 DENSE BREASTS: ICD-10-CM

## 2019-07-15 DIAGNOSIS — Z12.31 SCREENING MAMMOGRAM, ENCOUNTER FOR: ICD-10-CM

## 2019-07-15 DIAGNOSIS — R73.01 IMPAIRED FASTING GLUCOSE: Primary | ICD-10-CM

## 2019-07-15 PROCEDURE — 3008F BODY MASS INDEX DOCD: CPT | Performed by: FAMILY MEDICINE

## 2019-07-15 PROCEDURE — 99213 OFFICE O/P EST LOW 20 MIN: CPT | Performed by: FAMILY MEDICINE

## 2019-07-15 PROCEDURE — 1036F TOBACCO NON-USER: CPT | Performed by: FAMILY MEDICINE

## 2019-07-15 RX ORDER — NAFTIFINE HYDROCHLORIDE 20 MG/G
CREAM TOPICAL
COMMUNITY
Start: 2019-06-28 | End: 2021-01-26 | Stop reason: ALTCHOICE

## 2019-07-15 NOTE — PROGRESS NOTES
Assessment/Plan:    Problem List Items Addressed This Visit     Dense breasts    Relevant Orders    Mammo screening bilateral w 3d & cad    Impaired fasting glucose - Primary     A1c is up to 6 1  She is going to work on diet and exercise  Relevant Orders    Hemoglobin A1C      Other Visit Diagnoses     Screening mammogram, encounter for        Relevant Orders    Mammo screening bilateral w 3d & cad          The 10-year ASCVD risk score (Anuradha Main et al , 2013) is: 0 7%    Values used to calculate the score:      Age: 51 years      Sex: Female      Is Non- : No      Diabetic: No      Tobacco smoker: No      Systolic Blood Pressure: 025 mmHg      Is BP treated: No      HDL Cholesterol: 72 mg/dL      Total Cholesterol: 216 mg/dL     There are no Patient Instructions on file for this visit  Return in about 3 months (around 10/15/2019) for Next scheduled follow up  Subjective:      Patient ID: Pollo Puentes is a 51 y o  female  Chief Complaint   Patient presents with    Follow-up     Psychiatric lpn       This past month she has been back on track with diet  She was eating badly when she was on a trip to [facility]  The following portions of the patient's history were reviewed and updated as appropriate:  past social history    Review of Systems      Current Outpatient Medications   Medication Sig Dispense Refill    Naftifine HCl 2 % CREA        No current facility-administered medications for this visit  Objective:    /68   Pulse 62   Temp 98 8 °F (37 1 °C)   Resp 16   Ht 5' 1" (1 549 m)   Wt 51 kg (112 lb 6 4 oz)   LMP 07/09/2019 (Exact Date)   BMI 21 24 kg/m²        Physical Exam   Constitutional: She appears well-developed and well-nourished  HENT:   Head: Normocephalic and atraumatic     Right Ear: External ear normal    Left Ear: External ear normal    Mouth/Throat: Oropharynx is clear and moist    Cardiovascular: Normal rate, regular rhythm and normal heart sounds  Exam reveals no friction rub  No murmur heard  Pulmonary/Chest: Effort normal and breath sounds normal  No respiratory distress  She has no wheezes  She has no rales  Musculoskeletal: She exhibits no edema or deformity  Nursing note and vitals reviewed               Jihan Goldsmith DO

## 2019-12-20 LAB — HBA1C MFR BLD: 5.7 % (ref 4.8–5.6)

## 2020-02-04 ENCOUNTER — OFFICE VISIT (OUTPATIENT)
Dept: FAMILY MEDICINE CLINIC | Facility: CLINIC | Age: 50
End: 2020-02-04
Payer: COMMERCIAL

## 2020-02-04 VITALS
TEMPERATURE: 98.5 F | RESPIRATION RATE: 16 BRPM | DIASTOLIC BLOOD PRESSURE: 56 MMHG | SYSTOLIC BLOOD PRESSURE: 100 MMHG | HEIGHT: 61 IN | HEART RATE: 72 BPM | BODY MASS INDEX: 19.45 KG/M2 | WEIGHT: 103 LBS

## 2020-02-04 DIAGNOSIS — Z13.6 SCREENING FOR CARDIOVASCULAR CONDITION: ICD-10-CM

## 2020-02-04 DIAGNOSIS — R73.01 IMPAIRED FASTING GLUCOSE: ICD-10-CM

## 2020-02-04 DIAGNOSIS — Z13.0 SCREENING FOR DEFICIENCY ANEMIA: ICD-10-CM

## 2020-02-04 DIAGNOSIS — E01.0 THYROMEGALY: ICD-10-CM

## 2020-02-04 DIAGNOSIS — R25.3 EYELID TWITCH: Primary | ICD-10-CM

## 2020-02-04 PROCEDURE — 1036F TOBACCO NON-USER: CPT | Performed by: FAMILY MEDICINE

## 2020-02-04 PROCEDURE — 3008F BODY MASS INDEX DOCD: CPT | Performed by: INTERNAL MEDICINE

## 2020-02-04 PROCEDURE — 3008F BODY MASS INDEX DOCD: CPT | Performed by: FAMILY MEDICINE

## 2020-02-04 PROCEDURE — 99213 OFFICE O/P EST LOW 20 MIN: CPT | Performed by: FAMILY MEDICINE

## 2020-02-04 NOTE — PROGRESS NOTES
Assessment/Plan:    1  Eyelid twitch  Comments:  New, recommended that she see Dr Ibeth Espitia for evaluation    2  Impaired fasting glucose  Assessment & Plan:  Improving  A1c is down to 5 7    Orders:  -     Hemoglobin A1C; Future; Expected date: 07/01/2020  -     Hemoglobin A1C    3  Thyromegaly  Assessment & Plan:  New  Patient can feel it when she swallows    Orders:  -     US thyroid; Future; Expected date: 02/04/2020  -     T4, free; Future; Expected date: 07/01/2020  -     TSH, 3rd generation; Future; Expected date: 07/01/2020  -     T3, free; Future; Expected date: 07/01/2020  -     T4, free  -     TSH, 3rd generation  -     T3, free    4  Screening for cardiovascular condition  -     Comprehensive metabolic panel; Future; Expected date: 07/01/2020  -     Lipid Panel with Direct LDL reflex; Future; Expected date: 07/01/2020  -     Comprehensive metabolic panel  -     Lipid Panel with Direct LDL reflex    5  Screening for deficiency anemia  -     CBC; Future; Expected date: 07/01/2020  -     CBC         There are no Patient Instructions on file for this visit  Return in about 6 months (around 8/4/2020) for Annual physical     Subjective:      Patient ID: Emeli Real is a 51 y o  female  Chief Complaint   Patient presents with    Eye Twitch     right eye  started a couple days ago  ac/ma        She has been having twitching in her right eye lid for 2 months  She has tried acupuncture  Even if she sleeps well she has the twitching  She is very concerned about her thyroid gland  She has a strong family history of thyroid issues in concern is related to her eye twitching  She can feel something where there when she swallows  That she has been watching her sugar intake  She has been following intermittent fasting  She has been able to lose weight        The following portions of the patient's history were reviewed and updated as appropriate:  past social history    Review of Systems   Neurological: Positive for dizziness (had one episode when standing up)  Negative for headaches  Current Outpatient Medications   Medication Sig Dispense Refill    Naftifine HCl 2 % CREA        No current facility-administered medications for this visit  Objective:    /56   Pulse 72   Temp 98 5 °F (36 9 °C)   Resp 16   Ht 5' 1" (1 549 m)   Wt 46 7 kg (103 lb)   BMI 19 46 kg/m²      Physical Exam   Constitutional: She appears well-developed and well-nourished  HENT:   Head: Normocephalic and atraumatic  Right Ear: External ear normal    Left Ear: External ear normal    Mouth/Throat: Oropharynx is clear and moist    Cardiovascular: Normal rate, regular rhythm and normal heart sounds  Exam reveals no friction rub  No murmur heard  Pulmonary/Chest: Effort normal and breath sounds normal  No respiratory distress  She has no wheezes  She has no rales  Musculoskeletal: She exhibits no edema or deformity  Nursing note and vitals reviewed            Lynn [employer], DO

## 2020-03-04 ENCOUNTER — HOSPITAL ENCOUNTER (OUTPATIENT)
Dept: RADIOLOGY | Facility: HOSPITAL | Age: 50
Discharge: HOME | End: 2020-03-04
Payer: COMMERCIAL

## 2020-03-04 DIAGNOSIS — E01.0 THYROMEGALY: ICD-10-CM

## 2020-03-04 PROCEDURE — 76536 US EXAM OF HEAD AND NECK: CPT

## 2020-03-17 ENCOUNTER — HOSPITAL ENCOUNTER (OUTPATIENT)
Dept: RADIOLOGY | Facility: HOSPITAL | Age: 50
Discharge: HOME | End: 2020-03-17
Payer: COMMERCIAL

## 2020-03-17 VITALS — BODY MASS INDEX: 19.45 KG/M2 | WEIGHT: 103 LBS | HEIGHT: 61 IN

## 2020-03-17 DIAGNOSIS — Z12.31 SCREENING MAMMOGRAM, ENCOUNTER FOR: ICD-10-CM

## 2020-03-17 DIAGNOSIS — R92.2 DENSE BREASTS: ICD-10-CM

## 2020-03-17 PROCEDURE — 77067 SCR MAMMO BI INCL CAD: CPT

## 2020-03-17 PROCEDURE — 77063 BREAST TOMOSYNTHESIS BI: CPT

## 2020-03-23 ENCOUNTER — TELEMEDICINE (OUTPATIENT)
Dept: FAMILY MEDICINE CLINIC | Facility: CLINIC | Age: 50
End: 2020-03-23
Payer: COMMERCIAL

## 2020-03-23 DIAGNOSIS — J06.9 UPPER RESPIRATORY TRACT INFECTION, UNSPECIFIED TYPE: Primary | ICD-10-CM

## 2020-03-23 PROCEDURE — 99213 OFFICE O/P EST LOW 20 MIN: CPT | Performed by: INTERNAL MEDICINE

## 2020-03-23 NOTE — LETTER
March 23, 2020     Patient: Mike Schwartz   YOB: 1970   Date of Visit: 3/23/2020       To Whom it May Concern:    Mike Schwartz is under my professional care  She was seen in my office on 3/23/2020  She is excused due to illness 3/23 and 3/24/20  If you have any questions or concerns, please don't hesitate to call           Sincerely,          Sangeetha Morin MD        CC: No Recipients

## 2020-03-23 NOTE — PROGRESS NOTES
Virtual Regular Visit    Reason for visit is cough, congestion, sore throat  Encounter provider Ariel Smith MD    Provider located at [address]  [address] [facility]  [address]  [place] [address]      Recent Visits  No visits were found meeting these conditions  Showing recent visits within past 7 days and meeting all other requirements     Future Appointments  No visits were found meeting these conditions  Showing future appointments within next 150 days and meeting all other requirements        After connecting through [employer], the patient was identified by name and date of birth  Demetria Matthews was informed that this is a telemedicine visit and that the visit is being conducted through [employer] which may not be secure and therefore, might not be HIPAA-compliant  My office door was closed  No one else was in the room  She acknowledged consent and understanding of privacy and security of the video platform  The patient has agreed to participate and understands they can discontinue the visit at any time  Subjective  Demetria Matthews is a 51 y o  female without significant medical history  She has had URI symptoms for 3 days  Has productive cough with whitish sputum, sore throat, malaise  No fever throughout illness  Denies dyspnea or wheeze  No contact to COVID that she is aware of  She would like a note to go back to work  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[address]  No past medical history on file  Past Surgical History:   Procedure Laterality Date    DENTAL SURGERY  05/2017    [facility]       Current Outpatient Medications   Medication Sig Dispense Refill    Naftifine HCl 2 % CREA        No current facility-administered medications for this visit  No Known Allergies    1   Upper respiratory tract infection, unspecified type  Comments:  She does not meet criteria for covid testing, although I feel she should not return to work at this time until her symptoms have improved as a prevention  I have given her a note for today and tomorrow from work  She was asked to call for any worsening symptoms  I spent 15 minutes with the patient during this visit      [id number]

## 2020-06-28 LAB
ALBUMIN SERPL-MCNC: 4.4 G/DL (ref 3.8–4.8)
ALBUMIN/GLOB SERPL: 1.8 {RATIO} (ref 1.2–2.2)
ALP SERPL-CCNC: 49 IU/L (ref 39–117)
ALT SERPL-CCNC: 18 IU/L (ref 0–32)
AST SERPL-CCNC: 23 IU/L (ref 0–40)
BILIRUB SERPL-MCNC: 0.4 MG/DL (ref 0–1.2)
BUN SERPL-MCNC: 15 MG/DL (ref 6–24)
BUN/CREAT SERPL: 21 (ref 9–23)
CALCIUM SERPL-MCNC: 9.5 MG/DL (ref 8.7–10.2)
CHLORIDE SERPL-SCNC: 103 MMOL/L (ref 96–106)
CHOLEST SERPL-MCNC: 203 MG/DL (ref 100–199)
CO2 SERPL-SCNC: 25 MMOL/L (ref 20–29)
CREAT SERPL-MCNC: 0.7 MG/DL (ref 0.57–1)
ERYTHROCYTE [DISTWIDTH] IN BLOOD BY AUTOMATED COUNT: 13 % (ref 11.7–15.4)
EST. AVERAGE GLUCOSE BLD GHB EST-MCNC: 131 MG/DL
GLOBULIN SER-MCNC: 2.5 G/DL (ref 1.5–4.5)
GLUCOSE SERPL-MCNC: 97 MG/DL (ref 65–99)
HBA1C MFR BLD: 6.2 % (ref 4.8–5.6)
HCT VFR BLD AUTO: 40.7 % (ref 34–46.6)
HDLC SERPL-MCNC: 84 MG/DL
HGB BLD-MCNC: 14 G/DL (ref 11.1–15.9)
LDLC SERPL CALC-MCNC: 99 MG/DL (ref 0–99)
LDLC/HDLC SERPL: 1.2 RATIO (ref 0–3.2)
MCH RBC QN AUTO: 32 PG (ref 26.6–33)
MCHC RBC AUTO-ENTMCNC: 34.4 G/DL (ref 31.5–35.7)
MCV RBC AUTO: 93 FL (ref 79–97)
MICRODELETION SYND BLD/T FISH: NORMAL
PLATELET # BLD AUTO: 253 X10E3/UL (ref 150–450)
POTASSIUM SERPL-SCNC: 4.3 MMOL/L (ref 3.5–5.2)
PROT SERPL-MCNC: 6.9 G/DL (ref 6–8.5)
RBC # BLD AUTO: 4.37 X10E6/UL (ref 3.77–5.28)
SL AMB EGFR AFRICAN AMERICAN: 118 ML/MIN/1.73
SL AMB EGFR NON AFRICAN AMERICAN: 102 ML/MIN/1.73
SL AMB VLDL CHOLESTEROL CALC: 20 MG/DL (ref 5–40)
SODIUM SERPL-SCNC: 140 MMOL/L (ref 134–144)
T3FREE SERPL-MCNC: 3.1 PG/ML (ref 2–4.4)
T4 FREE SERPL-MCNC: 1.47 NG/DL (ref 0.82–1.77)
TRIGL SERPL-MCNC: 102 MG/DL (ref 0–149)
TSH SERPL DL<=0.005 MIU/L-ACNC: 2.55 UIU/ML (ref 0.45–4.5)
WBC # BLD AUTO: 5 X10E3/UL (ref 3.4–10.8)

## 2020-09-30 ENCOUNTER — TELEMEDICINE (OUTPATIENT)
Dept: FAMILY MEDICINE CLINIC | Facility: CLINIC | Age: 50
End: 2020-09-30
Payer: COMMERCIAL

## 2020-09-30 DIAGNOSIS — Z20.828 EXPOSURE TO SARS-ASSOCIATED CORONAVIRUS: Primary | ICD-10-CM

## 2020-09-30 DIAGNOSIS — Z20.828 EXPOSURE TO SARS-ASSOCIATED CORONAVIRUS: ICD-10-CM

## 2020-09-30 PROCEDURE — 99213 OFFICE O/P EST LOW 20 MIN: CPT | Performed by: FAMILY MEDICINE

## 2020-09-30 PROCEDURE — U0003 INFECTIOUS AGENT DETECTION BY NUCLEIC ACID (DNA OR RNA); SEVERE ACUTE RESPIRATORY SYNDROME CORONAVIRUS 2 (SARS-COV-2) (CORONAVIRUS DISEASE [COVID-19]), AMPLIFIED PROBE TECHNIQUE, MAKING USE OF HIGH THROUGHPUT TECHNOLOGIES AS DESCRIBED BY CMS-2020-01-R: HCPCS | Performed by: FAMILY MEDICINE

## 2020-09-30 NOTE — PROGRESS NOTES
COVID-19 Virtual Visit     Assessment/Plan:    Problem List Items Addressed This Visit     None      Visit Diagnoses     Exposure to SARS-associated coronavirus    -  Primary    Relevant Orders    Novel Coronavirus (COVID-19), PCR [facility] - Collected at [facility] or [facility]        This virtual check-in was done via [employer] and patient was informed that this is a secure, HIPAA-compliant platform  She agrees to proceed     Disposition:      I referred Karly President to one of our centralized sites for a COVID-19 swab    I spent 15 minutes directly with the patient during this visit    Encounter provider Jermaine Lara MD    Provider located at [facility] [address]  [address] [place] [address] 1  [facility] [id number]    Recent Visits  No visits were found meeting these conditions  Showing recent visits within past 7 days and meeting all other requirements     Today's Visits  Date Type Provider Dept   09/30/20 Telemedicine Jermaine Lara, [facility] today's visits and meeting all other requirements     Future Appointments  No visits were found meeting these conditions  Showing future appointments within next 150 days and meeting all other requirements        Patient agrees to participate in a virtual check in via telephone or video visit instead of presenting to the office to address urgent/immediate medical needs  Patient is aware this is a billable service  After connecting through [employer], the patient was identified by name and date of birth  Artis Zuniga was informed that this was a telemedicine visit and that the exam was being conducted confidentially over secure lines  My office door was closed  No one else was in the room  Artis Zuniga acknowledged consent and understanding of privacy and security of the telemedicine visit  I informed the patient that I have reviewed her record in Epic and presented the opportunity for her to ask any questions regarding the visit today   The patient agreed to participate  Subjective  Genesis Schaffer is a 53 y o  female who is concerned about COVID-19  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[place] [address] and was informed by her manager that she has had COVID exposure  She reports no symptoms, requires screening  She has not experienced no symptoms, requires screening She has had contact with a person who is under investigation for or who is positive for COVID-19 within the last 14 days  She has not been hospitalized recently for fever and/or lower respiratory symptoms  No past medical history on file  Past Surgical History:   Procedure Laterality Date    DENTAL SURGERY  05/2017    [facility]       Current Outpatient Medications   Medication Sig Dispense Refill    Naftifine HCl 2 % CREA        No current facility-administered medications for this visit  No Known Allergies    Review of Systems   Constitutional: Negative for activity change, appetite change, chills, diaphoresis, fatigue and fever  HENT: Negative  Respiratory: Negative for cough, choking, chest tightness, shortness of breath and wheezing  Cardiovascular: Negative for chest pain, palpitations and leg swelling  Gastrointestinal: Negative for abdominal distention, abdominal pain, constipation, diarrhea, nausea and vomiting  Genitourinary: Negative for difficulty urinating, dyspareunia, dysuria, enuresis, flank pain, frequency, menstrual problem, pelvic pain and urgency  Musculoskeletal: Negative for arthralgias, back pain, gait problem, joint swelling, myalgias, neck pain and neck stiffness  Skin: Negative  Neurological: Negative for dizziness, tremors, syncope, facial asymmetry, weakness, light-headedness, numbness and headaches  Psychiatric/Behavioral: Negative  Video Exam    There were no vitals filed for this visit  Yulissa Pollard appears healthy  Physical Exam  Constitutional:       General: She is not in acute distress       Appearance: She is well-developed  She is not diaphoretic  HENT:      Head: Normocephalic and atraumatic  Eyes:      General: No scleral icterus  Right eye: No discharge  Left eye: No discharge  Conjunctiva/sclera: Conjunctivae normal    Neck:      Musculoskeletal: Normal range of motion  Pulmonary:      Effort: Pulmonary effort is normal    Skin:     General: Skin is warm  Neurological:      Mental Status: She is alert and oriented to person, place, and time  Psychiatric:         Behavior: Behavior normal          Thought Content: Thought content normal          Judgment: Judgment normal           VIRTUAL VISIT DISCLAIMER    Sherie Oliveros acknowledges that she has consented to an online visit or consultation  She understands that the online visit is based solely on information provided by her, and that, in the absence of a face-to-face physical evaluation by the physician, the diagnosis she receives is both limited and provisional in terms of accuracy and completeness  This is not intended to replace a full medical face-to-face evaluation by the physician  Sherie Oliveros understands and accepts these terms

## 2020-10-01 LAB — SARS-COV-2 RNA SPEC QL NAA+PROBE: NOT DETECTED

## 2020-11-17 ENCOUNTER — OFFICE VISIT (OUTPATIENT)
Dept: OBGYN CLINIC | Facility: CLINIC | Age: 50
End: 2020-11-17
Payer: COMMERCIAL

## 2020-11-17 VITALS
HEIGHT: 61 IN | SYSTOLIC BLOOD PRESSURE: 100 MMHG | DIASTOLIC BLOOD PRESSURE: 60 MMHG | WEIGHT: 110 LBS | BODY MASS INDEX: 20.77 KG/M2 | TEMPERATURE: 97 F

## 2020-11-17 DIAGNOSIS — R10.2 PELVIC PAIN: ICD-10-CM

## 2020-11-17 DIAGNOSIS — D21.9 FIBROIDS: Primary | ICD-10-CM

## 2020-11-17 PROCEDURE — 1036F TOBACCO NON-USER: CPT | Performed by: OBSTETRICS & GYNECOLOGY

## 2020-11-17 PROCEDURE — G0145 SCR C/V CYTO,THINLAYER,RESCR: HCPCS | Performed by: OBSTETRICS & GYNECOLOGY

## 2020-11-17 PROCEDURE — 87624 HPV HI-RISK TYP POOLED RSLT: CPT | Performed by: OBSTETRICS & GYNECOLOGY

## 2020-11-17 PROCEDURE — 99214 OFFICE O/P EST MOD 30 MIN: CPT | Performed by: OBSTETRICS & GYNECOLOGY

## 2020-11-17 PROCEDURE — 3008F BODY MASS INDEX DOCD: CPT | Performed by: OBSTETRICS & GYNECOLOGY

## 2020-11-20 LAB
HPV HR 12 DNA CVX QL NAA+PROBE: NEGATIVE
HPV16 DNA CVX QL NAA+PROBE: NEGATIVE
HPV18 DNA CVX QL NAA+PROBE: NEGATIVE

## 2020-11-23 LAB
LAB AP GYN PRIMARY INTERPRETATION: NORMAL
Lab: NORMAL

## 2020-12-07 ENCOUNTER — HOSPITAL ENCOUNTER (OUTPATIENT)
Dept: RADIOLOGY | Facility: HOSPITAL | Age: 50
Discharge: HOME | End: 2020-12-07
Payer: COMMERCIAL

## 2020-12-07 DIAGNOSIS — D21.9 FIBROIDS: ICD-10-CM

## 2020-12-07 PROCEDURE — 76830 TRANSVAGINAL US NON-OB: CPT

## 2020-12-07 PROCEDURE — 76856 US EXAM PELVIC COMPLETE: CPT

## 2020-12-09 ENCOUNTER — TELEPHONE (OUTPATIENT)
Dept: OBGYN CLINIC | Facility: CLINIC | Age: 50
End: 2020-12-09

## 2020-12-09 DIAGNOSIS — D21.9 FIBROIDS: Primary | ICD-10-CM

## 2020-12-09 DIAGNOSIS — N83.201 CYSTS OF BOTH OVARIES: ICD-10-CM

## 2020-12-09 DIAGNOSIS — N83.202 CYSTS OF BOTH OVARIES: ICD-10-CM

## 2020-12-19 ENCOUNTER — IMMUNIZATIONS (OUTPATIENT)
Dept: FAMILY MEDICINE CLINIC | Facility: HOSPITAL | Age: 50
End: 2020-12-19
Payer: COMMERCIAL

## 2020-12-19 DIAGNOSIS — Z23 ENCOUNTER FOR IMMUNIZATION: ICD-10-CM

## 2020-12-19 PROCEDURE — 91300 SARS-COV-2 / COVID-19 MRNA VACCINE (PFIZER-BIONTECH) 30 MCG: CPT

## 2020-12-19 PROCEDURE — 0001A SARS-COV-2 / COVID-19 MRNA VACCINE (PFIZER-BIONTECH) 30 MCG: CPT

## 2021-01-09 ENCOUNTER — IMMUNIZATIONS (OUTPATIENT)
Dept: FAMILY MEDICINE CLINIC | Facility: HOSPITAL | Age: 51
End: 2021-01-09

## 2021-01-09 DIAGNOSIS — Z23 ENCOUNTER FOR IMMUNIZATION: ICD-10-CM

## 2021-01-09 PROCEDURE — 0002A SARS-COV-2 / COVID-19 MRNA VACCINE (PFIZER-BIONTECH) 30 MCG: CPT

## 2021-01-09 PROCEDURE — 91300 SARS-COV-2 / COVID-19 MRNA VACCINE (PFIZER-BIONTECH) 30 MCG: CPT

## 2021-02-02 ENCOUNTER — TELEMEDICINE (OUTPATIENT)
Dept: FAMILY MEDICINE CLINIC | Facility: CLINIC | Age: 51
End: 2021-02-02
Payer: COMMERCIAL

## 2021-02-02 DIAGNOSIS — Z13.6 SCREENING FOR CARDIOVASCULAR CONDITION: ICD-10-CM

## 2021-02-02 DIAGNOSIS — R73.01 IMPAIRED FASTING GLUCOSE: Primary | ICD-10-CM

## 2021-02-02 DIAGNOSIS — Z13.0 SCREENING FOR DEFICIENCY ANEMIA: ICD-10-CM

## 2021-02-02 DIAGNOSIS — R92.2 DENSE BREASTS: ICD-10-CM

## 2021-02-02 DIAGNOSIS — M54.12 CERVICAL RADICULOPATHY: ICD-10-CM

## 2021-02-02 DIAGNOSIS — Z12.11 COLON CANCER SCREENING: ICD-10-CM

## 2021-02-02 DIAGNOSIS — E55.9 VITAMIN D DEFICIENCY: ICD-10-CM

## 2021-02-02 DIAGNOSIS — Z12.31 SCREENING MAMMOGRAM, ENCOUNTER FOR: ICD-10-CM

## 2021-02-02 PROBLEM — E01.0 THYROMEGALY: Status: RESOLVED | Noted: 2020-02-04 | Resolved: 2021-02-02

## 2021-02-02 PROBLEM — J06.9 UPPER RESPIRATORY TRACT INFECTION: Status: RESOLVED | Noted: 2020-03-23 | Resolved: 2021-02-02

## 2021-02-02 PROCEDURE — 99214 OFFICE O/P EST MOD 30 MIN: CPT | Performed by: FAMILY MEDICINE

## 2021-02-02 PROCEDURE — 1036F TOBACCO NON-USER: CPT | Performed by: FAMILY MEDICINE

## 2021-02-02 NOTE — PROGRESS NOTES
Virtual Regular Visit      Assessment/Plan:    Problem List Items Addressed This Visit     Dense breasts    Relevant Orders    US breast screening bilateral complete ([facility]US)    Impaired fasting glucose - Primary     She is at ideal bodyweight  Will continue to monitor labs         Relevant Orders    Hemoglobin A1C    Vitamin D deficiency     Not currently on any supplements  Will check labs  Relevant Orders    Vitamin D 25 hydroxy      Other Visit Diagnoses     Colon cancer screening        she is turning 49 in March    Relevant Orders    Ambulatory referral to Gastroenterology    Screening mammogram, encounter for        Relevant Orders    Mammo screening bilateral w 3d & cad    US breast screening bilateral complete (ABUS)    Screening for cardiovascular condition        Relevant Orders    Comprehensive metabolic panel    Lipid Panel with Direct LDL reflex    Screening for deficiency anemia        Relevant Orders    CBC    Cervical radiculopathy        new    Relevant Orders    Ambulatory referral to Physical Therapy        Return in about 6 months (around 8/2/2021) for Annual physical          Reason for visit is   Chief Complaint   Patient presents with    Virtual Regular Visit        Encounter provider Ivett Morrison DO    Provider located at [address]  [address] [place] [id number]      Recent Visits  No visits were found meeting these conditions  Showing recent visits within past 7 days and meeting all other requirements     Today's Visits  Date Type Provider Dept   02/02/21 [address] [facility], [address] [place] [address] today's visits and meeting all other requirements     Future Appointments  No visits were found meeting these conditions  Showing future appointments within next 150 days and meeting all other requirements        The patient was identified by name and date of birth   Hector Vela was informed that this is a telemedicine visit and that the visit is being conducted through [employer] and patient was informed that this is not a secure, HIPAA-compliant platform  She agrees to proceed     My office door was closed  No one else was in the room  She acknowledged consent and understanding of privacy and security of the video platform  The patient has agreed to participate and understands they can discontinue the visit at any time  Patient is aware this is a billable service  Subjective  Silke Ledesma is a 53 y o  female    She did see she is regularly to have her eyes checked  She has been tingling in her left hand in her 3rd, 4th and 5th fingers  When she exercises it helps  Impaired fasting glucose - she was following intermittent diet  Past Medical History:   Diagnosis Date    Upper respiratory tract infection 3/23/2020    She does not meet criteria for covid testing, although I feel she should not return to work at this time until her symptoms have improved  Past Surgical History:   Procedure Laterality Date    DENTAL SURGERY  05/2017    [facility]       No current outpatient medications on file  No current facility-administered medications for this visit  No Known Allergies    Review of Systems   Respiratory: Negative  Cardiovascular: Negative  Neurological:        Tingling in left hand       Video Exam    There were no vitals filed for this visit  Physical Exam  Vitals signs reviewed  Constitutional:       General: She is not in acute distress  Pulmonary:      Effort: Pulmonary effort is normal    Neurological:      Mental Status: She is alert  Psychiatric:         Behavior: Behavior normal          Thought Content: Thought content normal          Judgment: Judgment normal           I spent 16 minutes directly with the patient during this visit      VIRTUAL VISIT Sumeet Castro acknowledges that she has consented to an online visit or consultation   She understands that the online visit is based solely on information provided by her, and that, in the absence of a face-to-face physical evaluation by the physician, the diagnosis she receives is both limited and provisional in terms of accuracy and completeness  This is not intended to replace a full medical face-to-face evaluation by the physician  Gustavo Poisson understands and accepts these terms

## 2021-02-08 ENCOUNTER — EVALUATION (OUTPATIENT)
Dept: PHYSICAL THERAPY | Facility: CLINIC | Age: 51
End: 2021-02-08
Payer: COMMERCIAL

## 2021-02-08 DIAGNOSIS — M54.12 CERVICAL RADICULOPATHY: Primary | ICD-10-CM

## 2021-02-08 PROCEDURE — 97530 THERAPEUTIC ACTIVITIES: CPT | Performed by: PHYSICAL THERAPIST

## 2021-02-08 PROCEDURE — 97161 PT EVAL LOW COMPLEX 20 MIN: CPT | Performed by: PHYSICAL THERAPIST

## 2021-02-08 NOTE — PROGRESS NOTES
PT Evaluation     Today's date: 2021  Patient name: Gustavo Gray  : 1970  MRN: [id number]  Referring provider: Sharon Luevano DO  Dx:   Encounter Diagnosis     ICD-10-CM    1  Cervical radiculopathy  M54 12 Ambulatory referral to Physical Therapy    new                  Assessment  Assessment details: Gustavo Gray is a 53 y o  female who presents with cervical radiculopathy and signs and symptom of possible cervical stenosis  Patient demonstrates poor posture, decreased flexibility, decreased c/s ROM, decreased activity tolerance, and decreased function  Patient would benefit from skilled physical therapy in order to address said deficits and improve functional mobility  Impairments: abnormal or restricted ROM, abnormal movement, activity intolerance, impaired balance, lacks appropriate home exercise program, pain with function, weight-bearing intolerance, poor posture  and poor body mechanics  Understanding of Dx/Px/POC: good   Prognosis: good    Goals  STG;  Pt will demonstrate proper posture independently  Independent with HEP  LTG:  Increase c/s ROM > 8 degrees  Pt will work full day without difficulty   Eliminate numbness    Plan  Patient would benefit from: skilled physical therapy  Planned modality interventions: cryotherapy and thermotherapy: hydrocollator packs  Planned therapy interventions: manual therapy, joint mobilization, abdominal trunk stabilization, balance, massage, neuromuscular re-education, patient education, postural training, self care, strengthening, stretching, therapeutic activities, therapeutic exercise, flexibility, functional ROM exercises, graded exercise, graded activity and home exercise program  Frequency: 2x week  Duration in weeks: 6  Treatment plan discussed with: patient        Subjective Evaluation    History of Present Illness  Mechanism of injury: Patient reports on and off tingling into left  Hand along ulnar nerve pattern into 3rd-5th finger   She notes she looks down a lot and thinks it could be her neck  She h as never had any imaging  She notes its been about 20 days of symptoms and denies trauma  No abnormalities in shoulder ROM  She notes she has decreased symptoms with pec stretch on that side  She has more tightness into her left  Pt is right hand dominant  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[employer]  She did have both covid vaccines     Pain  No pain reported  Location: 3/10 numbness scale           Objective     Tenderness     Additional Tenderness Details  Tender at left upper trap, left levator, mildly at left paraspinals     Moderate rounded shoulders and forward head     Active Range of Motion   Cervical/Thoracic Spine       Cervical    Flexion: 43 (improved numbness ) degrees   Extension: 50 (worsened numbness ) degrees      Left lateral flexion: 33 degrees      Right lateral flexion: 33 degrees      Left rotation: 77 (increased n/t ) degrees  Right rotation: 81 (no change n/t) degrees           Additional Active Range of Motion Details  No reports of n/t while lying supine, no change with supine manual traction     Strength/Myotome Testing     Left Shoulder     Planes of Motion   Flexion: 4+   Abduction: 4+     Right Shoulder     Planes of Motion   Flexion: 4+   Abduction: 4+       Flowsheet Rows      Most Recent Value   PT/OT G-Codes   Current Score  60   Projected Score  74             Precautions: None      Manuals 2/8            C/s stretching NV            UT/levator STM NV            Sub occipital release NV            Prone T/S MURALI CHOW            Neuro Re-Ed             Chin tucks 10x5"            Sustained c/s head lifts NV            TB rows NV            TB extensions  NV            TB horizontal abd  NV                                     Ther Activity             C/S towel Rotation  NV            C/s towel extensions  NV            Seated t/s extensions over roll NV            Upper trap stretch  10x5"            Levator stretch  10x5" Ther Ex             Db shoulder flexion   NV          DB shoulder abd    NV          Door pec stretch  NV            Seated t/s etensions  NV                         Modalities

## 2021-02-10 ENCOUNTER — TELEPHONE (OUTPATIENT)
Dept: GASTROENTEROLOGY | Facility: CLINIC | Age: 51
End: 2021-02-10

## 2021-02-10 NOTE — TELEPHONE ENCOUNTER
Called pt to discuss scheduling for screening colonoscopy  She stated she would be speaking with Dr Yehuda Puri today and possibly discuss scheduling for EGD/colonoscopy  Told pt I would follow up in a few weeks to be sure she got scheduled

## 2021-02-11 ENCOUNTER — APPOINTMENT (OUTPATIENT)
Dept: PHYSICAL THERAPY | Facility: CLINIC | Age: 51
End: 2021-02-11
Payer: COMMERCIAL

## 2021-02-11 NOTE — PROGRESS NOTES
Daily Note     Today's date: 2021  Patient name: Jolanta Goldberg  : 1970  MRN: [id number]  Referring provider: Maximino Feliz DO  Dx: No diagnosis found  Subjective: ***      Objective: See treatment diary below      Assessment: Tolerated treatment {Tolerated treatment :}   Patient {assessment:[id number]}      Plan: {PLAN:}     Precautions: None      Manuals            C/s stretching NV 7'           UT/levator STM NV 5'           Sub occipital release NV 3'           Prone T/S PA mobes NV NV           Neuro Re-Ed             Chin tucks 10x5" 10x5"           Sustained c/s head lifts NV 10x5"           TB rows NV RTB 2x10           TB extensions  NV RTB 2x10           TB horizontal abd  NV                                     Ther Activity             C/S towel Rotation  NV 5x5" ea           C/s towel extensions  NV 10x5"           Seated t/s extensions over roll NV 10x5"           Upper trap stretch  10x5" 5x5" ea           Levator stretch  10x5"  5x5" ea                                                  Ther Ex             Db shoulder flexion   NV          DB shoulder abd    NV          Door pec stretch  NV 20"x3           Seated t/s etensions  NV                         Modalities

## 2021-02-17 ENCOUNTER — OFFICE VISIT (OUTPATIENT)
Dept: PHYSICAL THERAPY | Facility: CLINIC | Age: 51
End: 2021-02-17
Payer: COMMERCIAL

## 2021-02-17 DIAGNOSIS — M54.12 CERVICAL RADICULOPATHY: Primary | ICD-10-CM

## 2021-02-17 PROCEDURE — 97140 MANUAL THERAPY 1/> REGIONS: CPT | Performed by: PHYSICAL THERAPIST

## 2021-02-17 PROCEDURE — 97112 NEUROMUSCULAR REEDUCATION: CPT | Performed by: PHYSICAL THERAPIST

## 2021-02-17 PROCEDURE — 97530 THERAPEUTIC ACTIVITIES: CPT | Performed by: PHYSICAL THERAPIST

## 2021-02-17 NOTE — PROGRESS NOTES
Daily Note     Today's date: 2021  Patient name: Gustavo Gray  : 1970  MRN: [id number]  Referring provider: Sharon Luevano DO  Dx:   Encounter Diagnosis     ICD-10-CM    1  Cervical radiculopathy  M54 12                   Subjective: Patient reports that whenever she feels numbness in her neck she performs chin tucks and it feels better in her arm  Objective: See treatment diary below      Assessment: Tolerated treatment well  Patient exhibited good technique with therapeutic exercises and would benefit from continued PT Patient reports she already feels fatigued after UBE today because she never exercises  She has no pain or complaints with exercises performed today  She feels appropriate muscles working, feeling a challenge with head lifts  Patient finds significant relief after manuals  Plan: Progress treatment as tolerated         Precautions: None      Manuals            C/s stretching NV 5'           UT/levator STM NV 5'           Sub occipital release NV 3'           Prone T/S PA bryce             Neuro Re-Ed             Chin tucks 10x5" 10x10"           Sustained c/s head lifts NV 10x10"            TB rows NV R TB 2x10           TB extensions  NV R TB 2x10           TB horizontal abd  R Tb 2x10                                      Ther Activity             C/S towel Rotation  NV            C/s towel extensions  NV            Seated t/s extensions over roll NV 10x5"           Upper trap stretch  10x5"            Levator stretch  10x5"                                                    Ther Ex             UBE  2'/2'  3'/3'          Db shoulder flexion   NV          DB shoulder abd    NV          Door pec stretch  NV 20" x3            Seated t/s etensions  NV 10x 5"                         Modalities

## 2021-02-24 ENCOUNTER — OFFICE VISIT (OUTPATIENT)
Dept: PHYSICAL THERAPY | Facility: CLINIC | Age: 51
End: 2021-02-24
Payer: COMMERCIAL

## 2021-02-24 DIAGNOSIS — M54.12 CERVICAL RADICULOPATHY: Primary | ICD-10-CM

## 2021-02-24 LAB
25(OH)D3+25(OH)D2 SERPL-MCNC: 21.2 NG/ML (ref 30–100)
ALBUMIN SERPL-MCNC: 3.9 G/DL (ref 3.8–4.8)
ALBUMIN/GLOB SERPL: 1.6 {RATIO} (ref 1.2–2.2)
ALP SERPL-CCNC: 44 IU/L (ref 39–117)
ALT SERPL-CCNC: 18 IU/L (ref 0–32)
AST SERPL-CCNC: 18 IU/L (ref 0–40)
BASOPHILS # BLD AUTO: 0 X10E3/UL (ref 0–0.2)
BASOPHILS NFR BLD AUTO: 1 %
BILIRUB SERPL-MCNC: 0.4 MG/DL (ref 0–1.2)
BUN SERPL-MCNC: 13 MG/DL (ref 6–24)
BUN/CREAT SERPL: 23 (ref 9–23)
CALCIUM SERPL-MCNC: 8.9 MG/DL (ref 8.7–10.2)
CHLORIDE SERPL-SCNC: 104 MMOL/L (ref 96–106)
CHOLEST SERPL-MCNC: 172 MG/DL (ref 100–199)
CO2 SERPL-SCNC: 22 MMOL/L (ref 20–29)
CREAT SERPL-MCNC: 0.56 MG/DL (ref 0.57–1)
EOSINOPHIL # BLD AUTO: 0.1 X10E3/UL (ref 0–0.4)
EOSINOPHIL NFR BLD AUTO: 1 %
ERYTHROCYTE [DISTWIDTH] IN BLOOD BY AUTOMATED COUNT: 13.1 % (ref 11.7–15.4)
GLOBULIN SER-MCNC: 2.5 G/DL (ref 1.5–4.5)
GLUCOSE SERPL-MCNC: 94 MG/DL (ref 65–99)
HBA1C MFR BLD: 5.9 % (ref 4.8–5.6)
HCT VFR BLD AUTO: 38.4 % (ref 34–46.6)
HDLC SERPL-MCNC: 83 MG/DL
HGB BLD-MCNC: 12.6 G/DL (ref 11.1–15.9)
IMM GRANULOCYTES # BLD: 0 X10E3/UL (ref 0–0.1)
IMM GRANULOCYTES NFR BLD: 0 %
LDLC SERPL CALC-MCNC: 80 MG/DL (ref 0–99)
LYMPHOCYTES # BLD AUTO: 1.7 X10E3/UL (ref 0.7–3.1)
LYMPHOCYTES NFR BLD AUTO: 40 %
MCH RBC QN AUTO: 30.3 PG (ref 26.6–33)
MCHC RBC AUTO-ENTMCNC: 32.8 G/DL (ref 31.5–35.7)
MCV RBC AUTO: 92 FL (ref 79–97)
MICRODELETION SYND BLD/T FISH: NORMAL
MONOCYTES # BLD AUTO: 0.3 X10E3/UL (ref 0.1–0.9)
MONOCYTES NFR BLD AUTO: 6 %
NEUTROPHILS # BLD AUTO: 2.2 X10E3/UL (ref 1.4–7)
NEUTROPHILS NFR BLD AUTO: 52 %
PLATELET # BLD AUTO: 244 X10E3/UL (ref 150–450)
POTASSIUM SERPL-SCNC: 4.1 MMOL/L (ref 3.5–5.2)
PROT SERPL-MCNC: 6.4 G/DL (ref 6–8.5)
RBC # BLD AUTO: 4.16 X10E6/UL (ref 3.77–5.28)
SL AMB EGFR AFRICAN AMERICAN: 127 ML/MIN/1.73
SL AMB EGFR NON AFRICAN AMERICAN: 110 ML/MIN/1.73
SODIUM SERPL-SCNC: 138 MMOL/L (ref 134–144)
TRIGL SERPL-MCNC: 45 MG/DL (ref 0–149)
WBC # BLD AUTO: 4.3 X10E3/UL (ref 3.4–10.8)

## 2021-02-24 PROCEDURE — 97140 MANUAL THERAPY 1/> REGIONS: CPT | Performed by: PHYSICAL THERAPIST

## 2021-02-24 PROCEDURE — 97112 NEUROMUSCULAR REEDUCATION: CPT | Performed by: PHYSICAL THERAPIST

## 2021-02-24 PROCEDURE — 97110 THERAPEUTIC EXERCISES: CPT | Performed by: PHYSICAL THERAPIST

## 2021-02-24 PROCEDURE — 97530 THERAPEUTIC ACTIVITIES: CPT | Performed by: PHYSICAL THERAPIST

## 2021-02-24 NOTE — PROGRESS NOTES
Daily Note     Today's date: 2021  Patient name: Gary Worthy  : 1970  MRN: [id number]  Referring provider: Cassandra Forde DO  Dx:   Encounter Diagnosis     ICD-10-CM    1  Cervical radiculopathy  M54 12                   Subjective: Patient reports she felt good after last session  She has less tingling into arm  She is tight in neck and arms as well as thighs today due to playing top golf for 2 hours yesterday  Objective: See treatment diary below      Assessment: Tolerated treatment well  Patient exhibited good technique with therapeutic exercises and would benefit from continued PT Patient with increased tenderness with manuals to UT and levator today  She has increased tone on left side of UT which improved after manuals  HEP updated with Green TB for rows/ext  Patient tolerated only light pressure with PA mobes today  Plan: Progress treatment as tolerated         Precautions: None      Manuals           C/s stretching NV 5' 5'          UT/levator STM NV 5' 5'          Sub occipital release NV 3' 3'          Prone T/S PA mobes   4'          Neuro Re-Ed             Chin tucks 10x5" 10x10" 15x10'          Sustained c/s head lifts NV 10x10"  10x15"           TB rows NV R TB 2x10 G tb 2x10          TB extensions  NV R TB 2x10 G Tb 2x10          TB horizontal abd  R Tb 2x10  G Tb 2x10                                     Ther Activity             C/S towel Rotation  NV            C/s towel extensions  NV            Seated t/s extensions over roll NV 10x5" 10x5"           Upper trap stretch  10x5"            Levator stretch  10x5"   5x5"                                                  Ther Ex             UBE  2'/2'  2'/2'          Db shoulder flexion   NV          DB shoulder abd    NV          Door pec stretch  NV 20" x3  20:x3          Seated t/s etensions  NV 10x 5"  10x5"                       Modalities

## 2021-03-04 NOTE — TELEPHONE ENCOUNTER
Called and spoke to patient she would like a Colon/EGD on 04/09/2021 at [facility] but she wants to know if her insurance is okay with having her just having the procedures or would she need to come into the office ? Mailing instructions just in case     Please advise if she needs an apt    Thank you!

## 2021-03-07 ENCOUNTER — PREP FOR PROCEDURE (OUTPATIENT)
Dept: GASTROENTEROLOGY | Facility: CLINIC | Age: 51
End: 2021-03-07

## 2021-03-07 DIAGNOSIS — K21.9 GASTROESOPHAGEAL REFLUX DISEASE, UNSPECIFIED WHETHER ESOPHAGITIS PRESENT: Primary | ICD-10-CM

## 2021-03-07 DIAGNOSIS — Z12.11 SCREENING FOR COLON CANCER: ICD-10-CM

## 2021-03-07 DIAGNOSIS — Z20.822 ENCOUNTER FOR LABORATORY TESTING FOR COVID-19 VIRUS: ICD-10-CM

## 2021-03-07 NOTE — TELEPHONE ENCOUNTER
Called pt, schedule colonoscopy/EGD with Dr Juhi Rivas 4/9 at [facility]  Pt aware to get covid testing done 4/3  Reviewed prep, reminded needs a  and will get a call the day before with the arrival time  Pt asked to be first case  Prep instructions noted to have already been mailed  Told pt if does not receive them soon to call back and I will email them  Provider:  Please send Suprep to [facility]  Thank you!   Dx:[id number] 11/Dr Juhi Rivas

## 2021-03-20 NOTE — TELEPHONE ENCOUNTER
Rescheduled from 6/9 [facility] to 6/17 [facility] with Dr Sharon Goncalves

## 2021-04-21 ENCOUNTER — HOSPITAL ENCOUNTER (OUTPATIENT)
Dept: RADIOLOGY | Facility: HOSPITAL | Age: 51
Discharge: HOME | End: 2021-04-21
Payer: COMMERCIAL

## 2021-04-21 VITALS — HEIGHT: 61 IN | BODY MASS INDEX: 20.39 KG/M2 | WEIGHT: 108 LBS

## 2021-04-21 DIAGNOSIS — Z12.31 SCREENING MAMMOGRAM, ENCOUNTER FOR: ICD-10-CM

## 2021-04-21 DIAGNOSIS — R92.2 DENSE BREASTS: ICD-10-CM

## 2021-04-21 PROCEDURE — 77067 SCR MAMMO BI INCL CAD: CPT

## 2021-04-21 PROCEDURE — 77063 BREAST TOMOSYNTHESIS BI: CPT

## 2021-04-21 PROCEDURE — 76641 ULTRASOUND BREAST COMPLETE: CPT

## 2021-04-29 ENCOUNTER — OFFICE VISIT (OUTPATIENT)
Dept: FAMILY MEDICINE CLINIC | Facility: CLINIC | Age: 51
End: 2021-04-29
Payer: COMMERCIAL

## 2021-04-29 VITALS
DIASTOLIC BLOOD PRESSURE: 72 MMHG | TEMPERATURE: 99 F | OXYGEN SATURATION: 97 % | WEIGHT: 104 LBS | SYSTOLIC BLOOD PRESSURE: 104 MMHG | RESPIRATION RATE: 16 BRPM | HEART RATE: 72 BPM | BODY MASS INDEX: 19.63 KG/M2 | HEIGHT: 61 IN

## 2021-04-29 DIAGNOSIS — M54.12 CERVICAL RADICULOPATHY: Primary | ICD-10-CM

## 2021-04-29 DIAGNOSIS — H57.89 IRRITATION OF LEFT EYE: ICD-10-CM

## 2021-04-29 PROCEDURE — 99213 OFFICE O/P EST LOW 20 MIN: CPT | Performed by: NURSE PRACTITIONER

## 2021-04-29 PROCEDURE — 3008F BODY MASS INDEX DOCD: CPT | Performed by: NURSE PRACTITIONER

## 2021-04-29 PROCEDURE — 3725F SCREEN DEPRESSION PERFORMED: CPT | Performed by: NURSE PRACTITIONER

## 2021-04-29 PROCEDURE — 1036F TOBACCO NON-USER: CPT | Performed by: NURSE PRACTITIONER

## 2021-04-29 RX ORDER — OLOPATADINE HYDROCHLORIDE 1 MG/ML
1 SOLUTION/ DROPS OPHTHALMIC 2 TIMES DAILY
Qty: 5 ML | Refills: 0 | Status: SHIPPED | OUTPATIENT
Start: 2021-04-29 | End: 2022-01-03 | Stop reason: ALTCHOICE

## 2021-04-29 NOTE — PROGRESS NOTES
Assessment/Plan:    1  Cervical radiculopathy  Comments:  Tried PT and still having symptoms then will follow up with MRI cervical region  Orders:  -     MRI cervical spine w wo contrast; Future; Expected date: 05/13/2021  -     Ambulatory referral to Physical Therapy; Future    2  Irritation of left eye  -     olopatadine (PATANOL) 0 1 % ophthalmic solution; Administer 1 drop into the left eye 2 (two) times a day          BMI Counseling: Body mass index is 19 65 kg/m²  Discussed the patient's BMI with her  Patient Instructions:  Supportive care discussed and advised  Advised to RTO for any worsening and no improvement  Follow up for no improvement and worsening of conditions  Patient advised and educated when to see immediate medical care  Return if symptoms worsen or fail to improve  Future Appointments   Date Time Provider Gurdeep Elliott   6/17/2021  8:00 AM [facility] ROOM 02 [facility] None           Subjective:      Patient ID: Tae Nichols is a 49 y o  female  Chief Complaint   Patient presents with    Continued nerve pain     mz cma         Vitals:  /72   Pulse 72   Temp 99 °F (37 2 °C)   Resp 16   Ht 5' 1" (1 549 m)   Wt 47 2 kg (104 lb)   LMP 04/18/2021   SpO2 97%   BMI 19 65 kg/m²     HPI  Patient seen by her PCP in feb for neck pain numbness in left arm  Stated that did PT for couple of weeks which helped slightly but numbness in arms is worse at times  Denies any headache, dizziness, chest pain and sob  Stated that wants to resume PT and wants to get MRI done to follow up on cervical radiculopathy  Also stated that has seasonal allergies and left arm is itchy     Denies any vision changes              PHQ-9 Depression Screening    PHQ-9:   Frequency of the following problems over the past two weeks:      Little interest or pleasure in doing things: 0 - not at all  Feeling down, depressed, or hopeless: 0 - not at all  PHQ-2 Score: 0             The following portions of the patient's history were reviewed and updated as appropriate: allergies, current medications, past family history, past medical history, past social history, past surgical history and problem list       Review of Systems   Constitutional: Negative  HENT: Negative  Eyes: Positive for itching  Negative for photophobia, pain, discharge and visual disturbance  As noted in HPI     Respiratory: Negative  Cardiovascular: Negative  Gastrointestinal: Negative  Genitourinary: Negative  Musculoskeletal: Positive for neck pain  As noted in HPI     Skin: Negative  Neurological: Positive for numbness  As noted in HPI     Psychiatric/Behavioral: Negative  Objective:    Social History     Tobacco Use   Smoking Status Never Smoker   Smokeless Tobacco Never Used       Allergies: No Known Allergies      Current Outpatient Medications   Medication Sig Dispense Refill    ciclopirox (LOPROX) 0 77 % cream apply to affected area twice a day  AS INSTRUCTED      olopatadine (PATANOL) 0 1 % ophthalmic solution Administer 1 drop into the left eye 2 (two) times a day 5 mL 0     No current facility-administered medications for this visit  Physical Exam  Constitutional:       Appearance: Normal appearance  HENT:      Head: Normocephalic and atraumatic  Nose: Nose normal    Eyes:      General: Lids are normal  Vision grossly intact  Conjunctiva/sclera: Conjunctivae normal       Right eye: Right conjunctiva is not injected  Left eye: Left conjunctiva is not injected  Cardiovascular:      Rate and Rhythm: Normal rate and regular rhythm  Pulses: Normal pulses  Heart sounds: Normal heart sounds  Pulmonary:      Effort: Pulmonary effort is normal       Breath sounds: Normal breath sounds  Musculoskeletal:      Comments: Tender on palpation on left cervical paraspinal musculature and at left trapezius muscle area  FROM of neck and left arm noted   Left arm strength is strong and hand grasp is strong   Skin:     General: Skin is warm and dry  Findings: No rash  Neurological:      Mental Status: She is alert and oriented to person, place, and time  Motor: Motor function is intact  No weakness  Psychiatric:         Mood and Affect: Mood normal          Behavior: Behavior normal          Thought Content:  Thought content normal          Judgment: Judgment normal                      DEV Reyes

## 2021-05-10 ENCOUNTER — EVALUATION (OUTPATIENT)
Dept: PHYSICAL THERAPY | Facility: REHABILITATION | Age: 51
End: 2021-05-10
Payer: COMMERCIAL

## 2021-05-10 DIAGNOSIS — M54.12 CERVICAL RADICULOPATHY: Primary | ICD-10-CM

## 2021-05-10 DIAGNOSIS — M54.2 NECK PAIN ON LEFT SIDE: ICD-10-CM

## 2021-05-10 PROCEDURE — 97110 THERAPEUTIC EXERCISES: CPT | Performed by: PHYSICAL THERAPIST

## 2021-05-10 PROCEDURE — 97161 PT EVAL LOW COMPLEX 20 MIN: CPT | Performed by: PHYSICAL THERAPIST

## 2021-05-10 NOTE — PROGRESS NOTES
PT Evaluation     Today's date: 5/10/2021  Patient name: Markie New  : 1970  MRN: [id number]  Referring provider: DEV Wagner  Dx:   Encounter Diagnosis     ICD-10-CM    1  Cervical radiculopathy  M54 12 Ambulatory referral to Physical Therapy    Tried PT and still having symptoms then will follow up with MRI cervical region   2  Neck pain on left side  M54 2        Start Time: 1800  Stop Time: 1845  Total time in clinic (min): 45 minutes    Assessment  Assessment details: Markie New is a 49y o  year old female who presents to [facility] with:   Cervical radiculopathy  (primary encounter diagnosis)  Neck pain on left side  Patient with symptoms and recreation of pain with cervical testing consistent with left sided cervical radiculopathy  Overall good BUE strength and range of motion noted  Patient will benefit from increased focus on neural mobilization, pain free range of motion and postural strengthening for return to prior level of function  [facility] the impairments as listed above and would benefit from Physical Therapy to address these impairments and to maximize function  Impairments: abnormal or restricted ROM, activity intolerance, impaired physical strength, lacks appropriate home exercise program, pain with function and poor posture   Understanding of Dx/Px/POC: good   Prognosis: good    Goals  ST  Patient will report 50% decrease in left arm N/T    2  Patient will demonstrate 50% improvement in postural strength  LT  Patient will be independent with HEP  2  Patient will report no pain while sleeping at night to improve rest and overall quality of life  3  Patient will be able to lift overhead without limitation from LUE symptoms  Plan  Plan details: Thank you for opportunity to participate in the care of Markie New      Patient would benefit from: PT eval and skilled physical therapy  Planned modality interventions: cryotherapy, unattended electrical stimulation and TENS  Planned therapy interventions: activity modification, flexibility, home exercise program, therapeutic exercise, therapeutic activities, stretching, strengthening, postural training, patient education, neuromuscular re-education, manual therapy and joint mobilization  Frequency: 2x week  Duration in visits: 10  Plan of Care beginning date: 5/10/2021  Treatment plan discussed with: patient        Subjective Evaluation    History of Present Illness  Mechanism of injury: Patient is a 49 y o  presenting to physical therapy with left sided neck pain and radicular symptoms including upper trap, upper arm and forearm pain on dorsal side  She reports numbness and tingling to the left hand  Her symptoms were improved by a previous course of PT but they recently returned  Reports no issues with her range of motion  Patient being referred by Alpa Raman  N/T/Radicular pain: numbness/tingling to 3rd and 4th digit  Previous Injury: None  Imaging: None, has MRI scheduled in   Sleeping position: supine   Occupation: nurse anesthetist; does a lot of lifting     PT goals: "No pain, no tingling through the exercise   Prefer non-invasive treatment "          Recurrent probem    Quality of life: good    Pain  Current pain rating: 3  At best pain ratin  At worst pain ratin  Quality: radiating  Relieving factors: change in position  Aggravating factors: overhead activity, standing and walking    Social Support    Employment status: working  Hand dominance: right    Treatments  Previous treatment: physical therapy and massage  Current treatment: massage and physical therapy  Patient Goals  Patient goals for therapy: decreased pain, increased motion, increased strength and return to sport/leisure activities          Objective     Concurrent Complaints  Negative for night pain, disturbed sleep, dizziness, faints, headaches, nausea/motion sickness, tinnitus, trouble swallowing, difficulty breathing, shortness of breath, respiratory pain, visual change, cardiac problem, kidney problem, gallbladder problem, stomach problem, ulcer, appendix problem, spleen problem, pancreas problem, history of cancer, history of trauma and infection    Postural Observations  Seated posture: fair  Standing posture: fair        Palpation   Left   Hypertonic in the upper trapezius  Tenderness of the upper trapezius  Right   Hypertonic in the upper trapezius  Tenderness of the upper trapezius  Tenderness   Cervical Spine   No tenderness in the left transverse process and right transverse process  Neurological Testing     Sensation   Cervical/Thoracic   Left   Intact: light touch    Right   Intact: light touch    Active Range of Motion   Cervical/Thoracic Spine       Cervical    Flexion: 40 degrees   Extension: 40 degrees     with pain  Left lateral flexion: 30 degrees      Right lateral flexion: 30 degrees     with pain  Left rotation:  WFL  Right rotation:  [facility]    Strength/Myotome Testing   Cervical Spine   Neck extension: 5  Neck flexion: 5    Left   Interossei strength (t1): 5    Right   Interossei strength (t1): 5    Left Shoulder     Planes of Motion   Flexion: 4+   Abduction: 5     Isolated Muscles   Lower trapezius: 3   Middle trapezius: 3   Upper trapezius: 5     Right Shoulder     Planes of Motion   Flexion: 4+   Abduction: 5     Isolated Muscles   Lower trapezius: 3   Middle trapezius: 3   Upper trapezius: 5     Left Elbow   Flexion: 5  Extension: 5    Right Elbow   Flexion: 5  Extension: 5    Left Wrist/Hand   Thumb extension: 5    Right Wrist/Hand   Thumb extension: 5    Tests   Cervical   Positive vertical compression and lumbar distraction test     Left   Positive Spurling's Test A  Right   Positive Spurling's Test A  Left Shoulder   Positive ULTT1  Right Shoulder   Negative ULTT1  Lumbar   Positive vertical compression        General Comments:    Upper quarter screen   Shoulder: unremarkable  Elbow: unremarkable  Hand/wrist: unremarkable    Shoulder Comments   Shoulder ROM WFL  Neuro Exam:     Headaches   Patient reports headaches: No               Precautions: N/A      Manuals 5/10            SOR             Manual cervical distraction             UT STM                          Neuro Re-Ed                          Median nerve glides D* 10                         UT stretch                                                    Ther Ex             UBE                          Prone chin tucks* 10            DNF             Prone I w CT* 10            Prone T w CT             Prone Ys             TB ER w retraction nv                         Pinky prone             Foam roller series                           Ther Activity                                       Gait Training                                       Modalities

## 2021-05-20 ENCOUNTER — OFFICE VISIT (OUTPATIENT)
Dept: PHYSICAL THERAPY | Facility: REHABILITATION | Age: 51
End: 2021-05-20
Payer: COMMERCIAL

## 2021-05-20 DIAGNOSIS — M54.2 NECK PAIN ON LEFT SIDE: ICD-10-CM

## 2021-05-20 DIAGNOSIS — M54.12 CERVICAL RADICULOPATHY: Primary | ICD-10-CM

## 2021-05-20 PROCEDURE — 97140 MANUAL THERAPY 1/> REGIONS: CPT

## 2021-05-20 PROCEDURE — 97110 THERAPEUTIC EXERCISES: CPT

## 2021-05-20 PROCEDURE — 97112 NEUROMUSCULAR REEDUCATION: CPT

## 2021-05-20 NOTE — PROGRESS NOTES
Daily Note     Today's date: 2021  Patient name: Zenaida Hernández  : 1970  MRN: [id number]  Referring provider: DEV Venegas  Dx:   Encounter Diagnosis     ICD-10-CM    1  Cervical radiculopathy  M54 12    2  Neck pain on left side  M54 2        Start Time: 1745  Stop Time: 1845  Total time in clinic (min): 60 minutes    Subjective: Patient reporting neck tightness at start of session as well as occasional left arm N/T stating "it comes and goes " She reports compliance with HEP reporting some relief with completion  Objective: See treatment diary below      Assessment: Tolerated treatment well  Patient demonstrated fatigue post treatment, exhibited good technique with therapeutic exercises and would benefit from continued PT Patient reporting relief after all manuals today  Patient had no reports of increased N/T or discomfort with any TE performed only fatigue  Cues required for proper form with chin tucks, improvements noted with increased duration  Plan: Continue per plan of care  Progress treatment as tolerated         Precautions: N/A      Manuals 5/10 5/20           SOR  done           Manual cervical distraction  done           UT STM  12' total                        Neuro Re-Ed                          Median nerve glides D* 10 10                        UT stretch                                                    Ther Ex             UBE                          Prone chin tucks* 10 2x10           DNF             Prone I w CT* 10 2x10            Prone T w CT             Prone Ys             TB ER w retraction nv OTB 3x10                        Pinky prone             Foam roller series   10 ea           S hook  Done            Ther Activity                                       Gait Training                                       Modalities

## 2021-05-25 ENCOUNTER — OFFICE VISIT (OUTPATIENT)
Dept: PHYSICAL THERAPY | Facility: REHABILITATION | Age: 51
End: 2021-05-25
Payer: COMMERCIAL

## 2021-05-25 DIAGNOSIS — M54.2 NECK PAIN ON LEFT SIDE: ICD-10-CM

## 2021-05-25 DIAGNOSIS — M54.12 CERVICAL RADICULOPATHY: Primary | ICD-10-CM

## 2021-05-25 PROCEDURE — 97110 THERAPEUTIC EXERCISES: CPT | Performed by: PHYSICAL THERAPIST

## 2021-05-25 PROCEDURE — 97140 MANUAL THERAPY 1/> REGIONS: CPT | Performed by: PHYSICAL THERAPIST

## 2021-05-25 NOTE — PROGRESS NOTES
Daily Note     Today's date: 2021  Patient name: Matt Grey  : 1970  MRN: [id number]  Referring provider: DEV Nieves  Dx:   Encounter Diagnosis     ICD-10-CM    1  Cervical radiculopathy  M54 12    2  Neck pain on left side  M54 2        Start Time: 1730  Stop Time: 1836  Total time in clinic (min): 66 minutes    Subjective: Patient reports decreased frequency of left hand numbness reporting every 2-3 hours as opposed to every hour  She reports sometimes she notices onset of symptoms at work such as when she is pushing a stretcher  Objective: See treatment diary below      Assessment: Tolerated treatment well  Continues to require cues for proper form with chin tucks to avoid excessive cervical flexion and extension  Good tolerance to all TE this session with no complaints of increased N/T  Some posterior left shoulder/neck soreness noted at end of session that improved with self TrP release and MHP  Patient demonstrated fatigue post treatment, exhibited good technique with therapeutic exercises and would benefit from continued PT  Plan: Continue per plan of care        Precautions: N/A      Manuals 5/10 5/20 5/25          SOR  done Done          Manual cervical distraction  done Done          UT STM  12' total Done 15'                       Neuro Re-Ed                          Median nerve glides D* 10 10 10                       UT stretch                                                    Ther Ex             UBE                          Prone chin tucks* 10 2x10 2x10x3"          DNF             Prone I w CT* 10 2x10  1# 2x10          Prone T w CT   2x10          Prone Ys             TB ER w retraction nv OTB 3x10 OTB 3x10                       Pinky prone             Foam roller series   10 ea 10 ea          S hook  Done  3'                       TB mid rows w CT   OTB 2x10                       Ther Activity                                       Gait Training Modalities             P   10'

## 2021-05-27 ENCOUNTER — OFFICE VISIT (OUTPATIENT)
Dept: PHYSICAL THERAPY | Facility: REHABILITATION | Age: 51
End: 2021-05-27
Payer: COMMERCIAL

## 2021-05-27 DIAGNOSIS — M54.2 NECK PAIN ON LEFT SIDE: ICD-10-CM

## 2021-05-27 DIAGNOSIS — M54.12 CERVICAL RADICULOPATHY: Primary | ICD-10-CM

## 2021-05-27 PROCEDURE — 97110 THERAPEUTIC EXERCISES: CPT | Performed by: PHYSICAL THERAPIST

## 2021-05-27 PROCEDURE — 97014 ELECTRIC STIMULATION THERAPY: CPT | Performed by: PHYSICAL THERAPIST

## 2021-05-27 PROCEDURE — G0283 ELEC STIM OTHER THAN WOUND: HCPCS | Performed by: PHYSICAL THERAPIST

## 2021-05-27 PROCEDURE — 97140 MANUAL THERAPY 1/> REGIONS: CPT | Performed by: PHYSICAL THERAPIST

## 2021-05-27 NOTE — PROGRESS NOTES
Daily Note     Today's date: 2021  Patient name: Aleksey Spears  : 1970  MRN: [id number]  Referring provider: DEV Robles  Dx:   Encounter Diagnosis     ICD-10-CM    1  Cervical radiculopathy  M54 12    2  Neck pain on left side  M54 2        Start Time: 1735  Stop Time: 1835  Total time in clinic (min): 60 minutes    Subjective: Patient reports decreased symptoms but does have some soreness in the left distal UT  She reports no numbness/tingling at night but still feels it when lifting or reaching at work  Objective: See treatment diary below      Assessment: Tolerated treatment well  Patient with no complaints of pain throughout session  Continues to require cues for proper form with postural strengthening for engagement of scapular muscles  Positive response to manual therapy with patient reporting relief of cervical tension with SOR this visit  Initiated motor TENS to target left UT trigger point for pain relief and trigger point release with patient reporting feeling "looser" post-treatment  Patient demonstrated fatigue post treatment and would benefit from continued PT  Plan: Continue per plan of care        Precautions: N/A      Manuals 5/10 5/20 5/25 5/27         SOR  done Done Done         Manual cervical distraction  done Done          UT STM  12' total Done 15' Done                      Neuro Re-Ed                          Median nerve glides D* 10 10 10                       UT stretch                          DNF    2x8                      Ther Ex             UBE                          Prone chin tucks* 10 2x10 2x10x3" dc         DNF             Prone I w CT* 10 2x10  1# 2x10 1# 2x10         Prone T w CT   2x10 2x10         Prone Ys             TB ER w retraction nv OTB 3x10 OTB 3x10 OTB 3x10                      Pinky prone             Foam roller series   10 ea 10 ea 20 ea         S hook  Done  3'                       TB mid rows w CT   OTB 2x10 OTB 2x10 Ther Activity                                       Gait Training                                       Modalities             [facility]   10' 10'         Motor TENS L UT  45" on  15" off  2 pps  200 US    Lv 20  10'

## 2021-06-01 ENCOUNTER — APPOINTMENT (OUTPATIENT)
Dept: PHYSICAL THERAPY | Facility: REHABILITATION | Age: 51
End: 2021-06-01
Payer: COMMERCIAL

## 2021-06-01 ENCOUNTER — OFFICE VISIT (OUTPATIENT)
Dept: PHYSICAL THERAPY | Facility: REHABILITATION | Age: 51
End: 2021-06-01
Payer: COMMERCIAL

## 2021-06-01 DIAGNOSIS — M54.12 CERVICAL RADICULOPATHY: Primary | ICD-10-CM

## 2021-06-01 DIAGNOSIS — M54.2 NECK PAIN ON LEFT SIDE: ICD-10-CM

## 2021-06-01 PROCEDURE — 97140 MANUAL THERAPY 1/> REGIONS: CPT | Performed by: PHYSICAL THERAPIST

## 2021-06-01 PROCEDURE — 97014 ELECTRIC STIMULATION THERAPY: CPT | Performed by: PHYSICAL THERAPIST

## 2021-06-01 PROCEDURE — G0283 ELEC STIM OTHER THAN WOUND: HCPCS | Performed by: PHYSICAL THERAPIST

## 2021-06-01 PROCEDURE — 97110 THERAPEUTIC EXERCISES: CPT | Performed by: PHYSICAL THERAPIST

## 2021-06-01 PROCEDURE — 97112 NEUROMUSCULAR REEDUCATION: CPT | Performed by: PHYSICAL THERAPIST

## 2021-06-01 NOTE — PROGRESS NOTES
Daily Note     Today's date: 2021  Patient name: Nba Hutchins  : 1970  MRN: [id number]  Referring provider: DEV Carolina  Dx:   Encounter Diagnosis     ICD-10-CM    1  Cervical radiculopathy  M54 12    2  Neck pain on left side  M54 2        Start Time: 1717  Stop Time: 1808  Total time in clinic (min): 51 minutes    Subjective: Patient states "it feels pretty good today " She does report some tenderness to trigger point in neck  She reports no left hand numbness today  Objective: See treatment diary below      Assessment: Tolerated treatment well  Palpable muscle relaxation following UT STM this visit and positive response to motor TENs/MHP in terms of muscle relaxation  Improved carryover of form with all TE noted this visit  Patient demonstrated fatigue post treatment, exhibited good technique with therapeutic exercises and would benefit from continued PT  Plan: Continue per plan of care        Precautions: N/A      Manuals 5/10 5/20 5/25 5/27 6/1        SOR  done Done Done Done        Manual cervical distraction  done Done          UT STM  12' total Done 15' Done Done                     Neuro Re-Ed                          Median nerve glides D* 10 10 10                       UT stretch     nv                     DNF    2x8 2x10                     Ther Ex             UBE                          Prone chin tucks* 10 2x10 2x10x3" dc         Prone I w CT* 10 2x10  1# 2x10 1# 2x10 1# 3x10        Prone T w CT   2x10 2x10 1# 2x10        Prone Ys             TB ER w retraction nv OTB 3x10 OTB 3x10 OTB 3x10 OTB 3x12                     Pinky prone     nv        Foam roller series   10 ea 10 ea 20 ea 10 ea        S hook  Done  3'                       TB mid rows w CT   OTB 2x10 OTB 2x10 OTB 3x10                      Ther Activity                                       Gait Training                                       Modalities             MHP   10' 10' 10'        Motor TENS L UT  45" on  15" off  2 pps  200 US    Lv 20  10' Lv 25 10'

## 2021-06-03 ENCOUNTER — OFFICE VISIT (OUTPATIENT)
Dept: PHYSICAL THERAPY | Facility: REHABILITATION | Age: 51
End: 2021-06-03
Payer: COMMERCIAL

## 2021-06-03 DIAGNOSIS — M54.2 NECK PAIN ON LEFT SIDE: ICD-10-CM

## 2021-06-03 DIAGNOSIS — M54.12 CERVICAL RADICULOPATHY: Primary | ICD-10-CM

## 2021-06-03 PROCEDURE — 97112 NEUROMUSCULAR REEDUCATION: CPT

## 2021-06-03 PROCEDURE — 97110 THERAPEUTIC EXERCISES: CPT

## 2021-06-03 PROCEDURE — 97140 MANUAL THERAPY 1/> REGIONS: CPT

## 2021-06-03 NOTE — PROGRESS NOTES
Daily Note     Today's date: 6/3/2021  Patient name: Kalen Murry  : 1970  MRN: [id number]  Referring provider: DEV Roth  Dx:   Encounter Diagnosis     ICD-10-CM    1  Cervical radiculopathy  M54 12    2  Neck pain on left side  M54 2        Start Time: 1730  Stop Time: 1830  Total time in clinic (min): 60 minutes    Subjective: Patient reporting feeling "pretty good" at start of session today, reporting no pain at start of session  She reports relief after use of stim and heat last session  Objective: See treatment diary below      Assessment: Tolerated treatment well  Patient demonstrated fatigue post treatment, exhibited good technique with therapeutic exercises and would benefit from continued PT Less restrictions noted this session with completion of STM UT, with patient reporting relief after completion  Plan: Continue per plan of care  Progress treatment as tolerated         Precautions: N/A      Manuals 5/10 5/20 5/25 5/27 6/1 6/3       SOR  done Done Done Done done       Manual cervical distraction  done Done          UT STM  12' total Done 15' Done Done Done                     Neuro Re-Ed                          Median nerve glides D* 10 10 10                       UT stretch     nv :10x10                    DNF    2x8 2x10 2x10                     Ther Ex             UBE      Retro 10W 5'                    Prone chin tucks* 10 2x10 2x10x3" dc         Prone I w CT* 10 2x10  1# 2x10 1# 2x10 1# 3x10 1# 3x10       Prone T w CT   2x10 2x10 1# 2x10 1# 3x10       Prone Ys             TB ER w retraction nv OTB 3x10 OTB 3x10 OTB 3x10 OTB 3x12 OTB 3x15                    Pinky prone     nv nv       Foam roller series   10 ea 10 ea 20 ea 10 ea 10 ea       S hook  Done  3'                       TB mid rows w CT   OTB 2x10 OTB 2x10 OTB 3x10  OTB 3x10                    Ther Activity                                       Gait Training                                       Modalities MHP   10' 10' 10' 10'       Motor TENS L UT  45" on  15" off  2 pps  200 US    Lv 20  10' Lv 25 10' 10'

## 2021-06-07 ENCOUNTER — EVALUATION (OUTPATIENT)
Dept: PHYSICAL THERAPY | Facility: REHABILITATION | Age: 51
End: 2021-06-07
Payer: COMMERCIAL

## 2021-06-07 DIAGNOSIS — M54.12 CERVICAL RADICULOPATHY: Primary | ICD-10-CM

## 2021-06-07 DIAGNOSIS — M54.2 NECK PAIN ON LEFT SIDE: ICD-10-CM

## 2021-06-07 DIAGNOSIS — Z12.11 COLON CANCER SCREENING: Primary | ICD-10-CM

## 2021-06-07 PROCEDURE — 97014 ELECTRIC STIMULATION THERAPY: CPT | Performed by: PHYSICAL THERAPIST

## 2021-06-07 PROCEDURE — G0283 ELEC STIM OTHER THAN WOUND: HCPCS | Performed by: PHYSICAL THERAPIST

## 2021-06-07 PROCEDURE — 97112 NEUROMUSCULAR REEDUCATION: CPT | Performed by: PHYSICAL THERAPIST

## 2021-06-07 PROCEDURE — 97110 THERAPEUTIC EXERCISES: CPT | Performed by: PHYSICAL THERAPIST

## 2021-06-07 PROCEDURE — 97140 MANUAL THERAPY 1/> REGIONS: CPT | Performed by: PHYSICAL THERAPIST

## 2021-06-07 NOTE — PROGRESS NOTES
PT Re-Evaluation     Today's date: 2021  Patient name: Lisandra Gallagher  : 1970  MRN: [id number]  Referring provider: DEV Arias  Dx:   Encounter Diagnosis     ICD-10-CM    1  Cervical radiculopathy  M54 12    2  Neck pain on left side  M54 2        Start Time: 1750  Stop Time: 1850  Total time in clinic (min): 60 minutes    Assessment  Assessment details: Inocencia Booth has made the following improvements since beginning PT:  decreased pain, increased ROM, increased strength, increased postural control and awareness and increased tolerance to activities  As evidenced by tests and measures performed this visit, Incoencia Booth presents with some improvement in cervical range of motion  She continues to be most limited into left side-bending due to limitations from pain  She also demonstrates improvements in neural tension correlating with improved numbness in the left hand  However, Inocencia Booth will continue to benefit from increased focus on postural strengthening and thoracic mobility for continued symptom management, prevention of future injury and progression toward independent HEP completion  Inocencia Booth continues to present with the impairments as listed above  Patient would continue to benefit from skilled PT to address these deficits and to maximize function  Impairments: abnormal or restricted ROM, activity intolerance, impaired physical strength, lacks appropriate home exercise program, pain with function and poor posture   Understanding of Dx/Px/POC: good   Prognosis: good    Goals  ST  Patient will report 50% decrease in left arm N/T  (met)  2  Patient will demonstrate 50% improvement in postural strength  (met)    LT  Patient will be independent with HEP  (progressing)  2  Patient will report no pain while sleeping at night to improve rest and overall quality of life  (progressing)  3  Patient will be able to lift overhead without limitation from LUE symptoms  (progressing)      Plan  Plan details:  Thank you for opportunity to participate in the care of Sherie Oliveros  Patient would benefit from: PT eval and skilled physical therapy  Planned modality interventions: cryotherapy, unattended electrical stimulation and TENS  Planned therapy interventions: activity modification, flexibility, home exercise program, therapeutic exercise, therapeutic activities, stretching, strengthening, postural training, patient education, neuromuscular re-education, manual therapy and joint mobilization  Frequency: 2x week  Duration in visits: 10  Plan of Care beginning date: 6/7/2021  Treatment plan discussed with: patient        Subjective Evaluation    History of Present Illness  Mechanism of injury: Verta Mesa has been seen for total of 7 visits for OP PT for neck pain and left hand numbness  Patient rates overall improvement since beginning PT 50%  Patient's global rating of change is " Moderately better (4) " Patient reports improvements with 50% decreased frequency of numbness  She also reports decreased tenderness to the left neck  Patient would like to achieve no numbness in the left arm  She notes continued difficulty with pushing a stretcher at work due to fear that she may aggravate symptoms  Patient is a 49 y o  presenting to physical therapy with left sided neck pain and radicular symptoms including upper trap, upper arm and forearm pain on dorsal side  She reports numbness and tingling to the left hand  Her symptoms were improved by a previous course of PT but they recently returned  Reports no issues with her range of motion  Patient being referred by Alpa Resendiz  N/T/Radicular pain: numbness/tingling to 3rd and 4th digit  Previous Injury: None  Imaging: None, has MRI scheduled in June  Sleeping position: supine   Occupation: nurse anesthetist; does a lot of lifting     PT goals: "No pain, no tingling through the exercise   Prefer non-invasive treatment "          Recurrent probem    Quality of life: good    Pain  Current pain rating: 3  At best pain ratin  At worst pain ratin  Quality: radiating  Relieving factors: change in position  Aggravating factors: overhead activity, standing and walking    Social Support    Employment status: working  Hand dominance: right    Treatments  Previous treatment: physical therapy and massage  Current treatment: massage and physical therapy  Patient Goals  Patient goals for therapy: decreased pain, increased motion, increased strength and return to sport/leisure activities          Objective     Concurrent Complaints  Negative for night pain, disturbed sleep, dizziness, faints, headaches, nausea/motion sickness, tinnitus, trouble swallowing, difficulty breathing, shortness of breath, respiratory pain, visual change, cardiac problem, kidney problem, gallbladder problem, stomach problem, ulcer, appendix problem, spleen problem, pancreas problem, history of cancer, history of trauma and infection    Postural Observations  Seated posture: fair  Standing posture: fair        Palpation   Left   Tenderness of the upper trapezius  Trigger point to upper trapezius  Tenderness   Cervical Spine   No tenderness in the left transverse process and right transverse process       Neurological Testing     Sensation   Cervical/Thoracic   Left   Intact: light touch    Right   Intact: light touch    Active Range of Motion   Cervical/Thoracic Spine       Cervical    Flexion: 55 degrees   Extension: 40 degrees     with pain  Left lateral flexion: 30 degrees     with pain  Right lateral flexion: 45 degrees      Left rotation:  WFL  Right rotation:  [facility]    Strength/Myotome Testing   Cervical Spine   Neck extension: 5  Neck flexion: 5    Left   Interossei strength (t1): 5    Right   Interossei strength (t1): 5    Left Shoulder     Planes of Motion   Flexion: 4+   Abduction: 5     Isolated Muscles   Lower trapezius: 3   Middle trapezius: 3   Upper trapezius: 5     Right Shoulder     Planes of Motion   Flexion: 4+   Abduction: 5     Isolated Muscles   Lower trapezius: 3   Middle trapezius: 3   Upper trapezius: 5     Left Elbow   Flexion: 5  Extension: 5    Right Elbow   Flexion: 5  Extension: 5    Left Wrist/Hand   Thumb extension: 5    Right Wrist/Hand   Thumb extension: 5    Tests   Cervical   Positive vertical compression and lumbar distraction test     Left   Positive Spurling's Test A  Right   Positive Spurling's Test A  Left Shoulder   Negative ULTT1  Right Shoulder   Negative ULTT1  Lumbar   Positive vertical compression        General Comments:    Upper quarter screen   Shoulder: unremarkable  Elbow: unremarkable  Hand/wrist: unremarkable    Shoulder Comments   Shoulder ROM [facility]  Neuro Exam:     Headaches   Patient reports headaches: No               Precautions: N/A      Manuals 5/10 5/20 5/25 5/27 6/1 6/3 6/7      SOR  done Done Done Done done Done      Manual cervical distraction  done Done          UT STM  12' total Done 15' Done Done Done  Done                   Neuro Re-Ed                          Median nerve glides D* 10 10 10                       UT stretch     nv :10x10 :10x10                   DNF    2x8 2x10 2x10  2x10      Tests and measures       10'      Ther Ex             UBE      Retro 10W 5' Retro 15W 5'                   Prone chin tucks* 10 2x10 2x10x3" dc         Prone I w CT* 10 2x10  1# 2x10 1# 2x10 1# 3x10 1# 3x10 1# 3x12      Prone T w CT   2x10 2x10 1# 2x10 1# 3x10 1# 3x10      Prone Ys             TB ER w retraction nv OTB 3x10 OTB 3x10 OTB 3x10 OTB 3x12 OTB 3x15 GTB 3x10                   Pinky prone     nv nv 10x10"      Foam roller series   10 ea 10 ea 20 ea 10 ea 10 ea nv      S hook  Done  3'                       TB mid rows w CT   OTB 2x10 OTB 2x10 OTB 3x10  OTB 3x10 OTB 3x12                   Ther Activity                                       Gait Training                                       Modalities             MHP   10' 10' 10' 10' 10' Motor TENS L UT  45" on  15" off  2 pps  200 US    Lv 20  10' Lv 25 10' 10' 10'

## 2021-06-08 ENCOUNTER — TELEPHONE (OUTPATIENT)
Dept: FAMILY MEDICINE CLINIC | Facility: CLINIC | Age: 51
End: 2021-06-08

## 2021-06-08 DIAGNOSIS — M54.12 CERVICAL RADICULOPATHY: Primary | ICD-10-CM

## 2021-06-08 NOTE — TELEPHONE ENCOUNTER
----- Message from Mahamed Serrano sent at 6/8/2021  3:26 PM EDT -----  Regarding: RE: Shahana Doe, thank you! Please have your office contact Racheal Aguilar and advise her to schedule  Thanks again,  [place] [employer]  ----- Message -----  From: DEV Campos  Sent: 6/8/2021  12:44 PM EDT  To: Mahamed Serrano  Subject: FW: PEER REVIEW                                  NICKY Hardwick ordered MRI cervical spine without contrast   Marty Tomlinson    ----- Message -----  From: Art Tobin DO  Sent: 6/8/2021  10:41 AM EDT  To: DEV Campos  Subject: FW: PEER REVIEW                                  You ordered the MRI  Thank you,  Art Tobin DO      ----- Message -----  From: Anibal Orr MA  Sent: 6/8/2021   9:43 AM EDT  To: Art Tobin DO  Subject: FW: PEER REVIEW                                    ----- Message -----  From: Mahamed Serrano  Sent: 6/8/2021   9:31 AM EDT  To: THE [facility]  Subject: PEER REVIEW                                      Good morning team,     Courtney Jaeger for the MRI cervical spine is pending denial with his insurance  They are offering an approval for an MRI cervical WITHOUT contrast   If not, they are offering a Peer Review at [phone]; Case #[id number]  Thank you,  [facility]  ___________________________________________________________________    Based on [employer] Sp[employer] Imaging Guidelines Section(s): SP 3 1 Neck (Cervical Spine) Pain without and with Neurological Features (Including Stenosis) and Preface to the Imaging Guidelines, section Preface-3 Clinical Information,  we cannot approve this request  Your records show that you have neck pain  The reason this request cannot be approved is because:      Picture studies taken before or after (not both) using a dye should show your doctor all of the details needed to treat you   Adding a second set of pictures (before and after using contrast dye) would not add value to the first set, and is not needed for your problem  A detailed picture study (magnetic resonance imaging or MRI) of your spine with pictures taken without using a dye is more likely to show your doctor all of the details they need to see in order to treat you  This study will be approved if requested

## 2021-06-08 NOTE — TELEPHONE ENCOUNTER
Please call patient and let her know that her MRI cervical spine without contrast has been approved and she should call to schedule it   DEV Mandujano'

## 2021-06-08 NOTE — TELEPHONE ENCOUNTER
----- Message from Huntley Goodpasture, LPN sent at 1/6/9359  3:51 PM EDT -----  Regarding: FW: PEER REVIEW    ----- Message -----  From: Jaime Singh  Sent: 6/8/2021   3:26 PM EDT  To: DEV Henriquez, #  Subject: RE: PEER REVIEW                                  Perfect, thank you! Please have your office contact Rosey Herman and advise her to schedule  Thanks again,  Priscilla Singh  ----- Message -----  From: DEV Henriquez  Sent: 6/8/2021  12:44 PM EDT  To: Jaime Singh  Subject: FW: PEER REVIEW                                  NICKY Hardwick ordered MRI cervical spine without contrast   Codi Khan    ----- Message -----  From: Fernando Buchanan DO  Sent: 6/8/2021  10:41 AM EDT  To: DEV Henriquez  Subject: FW: PEER REVIEW                                  You ordered the MRI  Thank you,  Fernando Buchanan DO      ----- Message -----  From: Zahra Armas MA  Sent: 6/8/2021   9:43 AM EDT  To: Fernando Buchanan DO  Subject: FW: PEER REVIEW                                    ----- Message -----  From: Jaime Singh  Sent: 6/8/2021   9:31 AM EDT  To: THE [facility]  Subject: PEER REVIEW                                      Good morning team,     Candy Milo for the MRI cervical spine is pending denial with his insurance  They are offering an approval for an MRI cervical WITHOUT contrast   If not, they are offering a Peer Review at [phone]; Case #[id number]  Thank you,  Priscilla Singh  ___________________________________________________________________    Based on [employer] Sp[employer] Imaging Guidelines Section(s): SP 3 1 Neck (Cervical Spine) Pain without and with Neurological Features (Including Stenosis) and Preface to the Imaging Guidelines, section Preface-3 Clinical Information,  we cannot approve this request  Your records show that you have neck pain   The reason this request cannot be approved is because:      Picture studies taken before or after (not both) using a dye should show your doctor all of the details needed to treat you  Adding a second set of pictures (before and after using contrast dye) would not add value to the first set, and is not needed for your problem  A detailed picture study (magnetic resonance imaging or MRI) of your spine with pictures taken without using a dye is more likely to show your doctor all of the details they need to see in order to treat you  This study will be approved if requested

## 2021-06-08 NOTE — TELEPHONE ENCOUNTER
Please call patient and let her know that her MRI cervical spine without contrast has been approved and she should call to schedule it   DEV Feliciano

## 2021-06-10 ENCOUNTER — OFFICE VISIT (OUTPATIENT)
Dept: PHYSICAL THERAPY | Facility: REHABILITATION | Age: 51
End: 2021-06-10
Payer: COMMERCIAL

## 2021-06-10 DIAGNOSIS — M54.2 NECK PAIN ON LEFT SIDE: ICD-10-CM

## 2021-06-10 DIAGNOSIS — M54.12 CERVICAL RADICULOPATHY: Primary | ICD-10-CM

## 2021-06-10 PROCEDURE — 97112 NEUROMUSCULAR REEDUCATION: CPT

## 2021-06-10 PROCEDURE — 97140 MANUAL THERAPY 1/> REGIONS: CPT

## 2021-06-10 PROCEDURE — 97110 THERAPEUTIC EXERCISES: CPT

## 2021-06-10 NOTE — PROGRESS NOTES
Daily Note     Today's date: 6/10/2021  Patient name: Monie Bazan  : 1970  MRN: [id number]  Referring provider: DEV Espino  Dx:   Encounter Diagnosis     ICD-10-CM    1  Cervical radiculopathy  M54 12    2  Neck pain on left side  M54 2        Start Time:   Stop Time:   Total time in clinic (min): 48 minutes    Subjective: Patient reporting neck as "about the same" at start of session reporting some soreness at start of session  She reports no complaints after previous session  Objective: See treatment diary below      Assessment: Tolerated treatment well  Patient demonstrated fatigue post treatment, exhibited good technique with therapeutic exercises and would benefit from continued PT Patient reporting relief with manuals, no pain reported with any TE performed  Plan: Continue per plan of care  Progress treatment as tolerated         Precautions: N/A      Manuals 5/10 5/20 5/25 5/27 6 6/3 6 6/10     SOR  done Done Done Done done Done Done      Manual cervical distraction  done Done          UT STM  12' total Done 15' Done Done Done  Done Done                   Neuro Re-Ed                          Median nerve glides D* 10 10 10                       UT stretch     nv :10x10 :10x10 :10x10                  DNF    2x8 2x10 2x10  2x10 2x10      Tests and measures       10'      Ther Ex             UBE      Retro 10W 5' Retro 15W 5' retro 15W 5'                  Prone chin tucks* 10 2x10 2x10x3" dc         Prone I w CT* 10 2x10  1# 2x10 1# 2x10 1# 3x10 1# 3x10 1# 3x12 1# 3x12     Prone T w CT   2x10 2x10 1# 2x10 1# 3x10 1# 3x10 1# 3x12     Prone Ys             TB ER w retraction nv OTB 3x10 OTB 3x10 OTB 3x10 OTB 3x12 OTB 3x15 GTB 3x10 GTB 3x10                  Pinky prone     nv nv 10x10" nv     Foam roller series   10 ea 10 ea 20 ea 10 ea 10 ea nv 10 ea     S hook  Done  3'                       TB mid rows w CT   OTB 2x10 OTB 2x10 OTB 3x10  OTB 3x10 OTB 3x12 nv Ther Activity                                       Gait Training                                       Modalities             MHP   10' 10' 10' 10' 10' 10'     Motor TENS L UT  45" on  15" off  2 pps  200 US    Lv 20  10' Lv 25 10' 10' 10' 10'

## 2021-06-14 ENCOUNTER — HOSPITAL ENCOUNTER (OUTPATIENT)
Dept: RADIOLOGY | Facility: HOSPITAL | Age: 51
Discharge: HOME | End: 2021-06-14
Payer: COMMERCIAL

## 2021-06-14 DIAGNOSIS — M54.12 CERVICAL RADICULOPATHY: ICD-10-CM

## 2021-06-14 PROCEDURE — G1004 CDSM NDSC: HCPCS

## 2021-06-14 PROCEDURE — 72141 MRI NECK SPINE W/O DYE: CPT

## 2021-06-16 ENCOUNTER — APPOINTMENT (OUTPATIENT)
Dept: PHYSICAL THERAPY | Facility: REHABILITATION | Age: 51
End: 2021-06-16
Payer: COMMERCIAL

## 2021-06-17 ENCOUNTER — OFFICE VISIT (OUTPATIENT)
Dept: PHYSICAL THERAPY | Facility: REHABILITATION | Age: 51
End: 2021-06-17
Payer: COMMERCIAL

## 2021-06-17 DIAGNOSIS — M54.12 CERVICAL RADICULOPATHY: Primary | ICD-10-CM

## 2021-06-17 DIAGNOSIS — M54.2 NECK PAIN ON LEFT SIDE: ICD-10-CM

## 2021-06-17 PROCEDURE — 97110 THERAPEUTIC EXERCISES: CPT

## 2021-06-17 PROCEDURE — 97140 MANUAL THERAPY 1/> REGIONS: CPT

## 2021-06-17 PROCEDURE — 97112 NEUROMUSCULAR REEDUCATION: CPT

## 2021-06-17 NOTE — PROGRESS NOTES
Daily Note     Today's date: 2021  Patient name: Trace Tejeda  : 1970  MRN: [id number]  Referring provider: DEV Neumann  Dx:   Encounter Diagnosis     ICD-10-CM    1  Cervical radiculopathy  M54 12    2  Neck pain on left side  M54 2        Start Time: 1745  Stop Time: 1845  Total time in clinic (min): 60 minutes    Subjective: Patient reporting neck feeling "okay" at start of session today  She reports no complaints after previous session  Objective: See treatment diary below      Assessment: Tolerated treatment well  Patient demonstrated fatigue post treatment, exhibited good technique with therapeutic exercises and would benefit from continued PT Patient reporting increased ease with completion of prone TE this session, trial use of heavier weight next session  Patient continues to report relief with manuals and use of heat and stim  Improved tolerance to all TE performed, no pain reported, only muscle fatigue  Plan: Continue per plan of care  Progress treatment as tolerated         Precautions: N/A      Manuals 5/10 5/20 5/25 5/27 6 6/3 6 6/10 6/17   SOR  done Done Done Done done Done Done  Done    Manual cervical distraction  done Done         UT STM  12' total Done 15' Done Done Done  Done Done  Done                Neuro Re-Ed                        Median nerve glides D* 10 10 10                     UT stretch     nv :10x10 :10x10 :10x10 :10x10               DNF    2x8 2x10 2x10  2x10 2x10  2x10   Tests and measures       10'     Ther Ex            UBE      Retro 10W 5' Retro 15W 5' retro 15W 5' Retro 15W 5'               Prone chin tucks* 10 2x10 2x10x3" dc        Prone I w CT* 10 2x10  1# 2x10 1# 2x10 1# 3x10 1# 3x10 1# 3x12 1# 3x12 1# 3x12 inc wt nv   Prone T w CT   2x10 2x10 1# 2x10 1# 3x10 1# 3x10 1# 3x12 1# 3x12 inc wt nv   Prone Ys            TB ER w retraction nv OTB 3x10 OTB 3x10 OTB 3x10 OTB 3x12 OTB 3x15 GTB 3x10 GTB 3x10 GTB 3x12               Pinky prone     nv nv 10x10" nv 10x10''   Foam roller series   10 ea 10 ea 20 ea 10 ea 10 ea nv 10 ea 10 ea   S hook  Done  3'                     TB mid rows w CT   OTB 2x10 OTB 2x10 OTB 3x10  OTB 3x10 OTB 3x12 nv GTB 3x10               Ther Activity                                    Gait Training                                    Modalities            MHP   10' 10' 10' 10' 10' 10' 10'   Motor TENS L UT  45" on  15" off  2 pps  200 US    Lv 20  10' Lv 25 10' 10' 10' 10' 10'

## 2021-06-21 ENCOUNTER — OFFICE VISIT (OUTPATIENT)
Dept: PHYSICAL THERAPY | Facility: REHABILITATION | Age: 51
End: 2021-06-21
Payer: COMMERCIAL

## 2021-06-21 DIAGNOSIS — M54.2 NECK PAIN ON LEFT SIDE: ICD-10-CM

## 2021-06-21 DIAGNOSIS — M54.12 CERVICAL RADICULOPATHY: Primary | ICD-10-CM

## 2021-06-21 PROCEDURE — 97112 NEUROMUSCULAR REEDUCATION: CPT

## 2021-06-21 PROCEDURE — 97110 THERAPEUTIC EXERCISES: CPT

## 2021-06-21 PROCEDURE — 97140 MANUAL THERAPY 1/> REGIONS: CPT

## 2021-06-21 NOTE — PROGRESS NOTES
Daily Note     Today's date: 2021  Patient name: Primitivo Knight  : 1970  MRN: [id number]  Referring provider: DEV Mendez  Dx:   Encounter Diagnosis     ICD-10-CM    1  Cervical radiculopathy  M54 12    2  Neck pain on left side  M54 2        Start Time: 1744  Stop Time: 1838  Total time in clinic (min): 54 minutes    Subjective: Patient reporting neck as "okay today" at start of session  She reports no pain and reports on complaints after previous session  Objective: See treatment diary below      Assessment: Tolerated treatment well  Patient demonstrated fatigue post treatment, exhibited good technique with therapeutic exercises and would benefit from continued PT Trialed increased weight with prone TE with patient tolerating well, no pain reported only muscle fatigue  Patient reporting relief with completion of pinky prone  Plan: Continue per plan of care  Progress treatment as tolerated         Precautions: N/A      Manuals 6/21  5/25 5/27 6/1 6/3 6/7 6/10 6/17   SOR   Done Done Done done Done Done  Done    Manual cervical distraction   Done         UT STM Done   Done 15' Done Done Done  Done Done  Done                Neuro Re-Ed                        Median nerve glides D*   10                     UT stretch :10x10    nv :10x10 :10x10 :10x10 :10x10               DNF 2x10    2x8 2x10 2x10  2x10 2x10  2x10   Tests and measures       10'     Ther Ex            UBE Retro 15W 5'     Retro 10W 5' Retro 15W 5' retro 15W 5' Retro 15W 5'               Prone chin tucks*   2x10x3" dc        Prone I w CT* 2# 3x10  1# 2x10 1# 2x10 1# 3x10 1# 3x10 1# 3x12 1# 3x12 1# 3x12 inc wt nv   Prone T w CT 2# 3x10   2x10 2x10 1# 2x10 1# 3x10 1# 3x10 1# 3x12 1# 3x12 inc wt nv   Prone Ys            TB ER w retraction   OTB 3x10 OTB 3x10 OTB 3x12 OTB 3x15 GTB 3x10 GTB 3x10 GTB 3x12               Pinky prone 10x10''    nv nv 10x10" nv 10x10''   Foam roller series  10 ea  10 ea 20 ea 10 ea 10 ea nv 10 ea 10 ea S hook   3'                     TB mid rows w CT   OTB 2x10 OTB 2x10 OTB 3x10  OTB 3x10 OTB 3x12 nv GTB 3x10   TB horizontal abduction PTB 3x10            Ther Activity                                    Gait Training                                    Modalities            MHP 10'  10' 10' 10' 10' 10' 10' 10'   Motor TENS L UT  45" on  15" off  2 pps  200 US 10'   Lv 20  10' Lv 25 10' 10' 10' 10' 10'

## 2021-06-22 ENCOUNTER — OFFICE VISIT (OUTPATIENT)
Dept: PHYSICAL THERAPY | Facility: REHABILITATION | Age: 51
End: 2021-06-22
Payer: COMMERCIAL

## 2021-06-22 DIAGNOSIS — M54.2 NECK PAIN ON LEFT SIDE: ICD-10-CM

## 2021-06-22 DIAGNOSIS — M54.12 CERVICAL RADICULOPATHY: Primary | ICD-10-CM

## 2021-06-22 PROCEDURE — 97110 THERAPEUTIC EXERCISES: CPT | Performed by: PHYSICAL THERAPIST

## 2021-06-22 PROCEDURE — 97140 MANUAL THERAPY 1/> REGIONS: CPT | Performed by: PHYSICAL THERAPIST

## 2021-06-22 NOTE — PROGRESS NOTES
Daily Note     Today's date: 2021  Patient name: Jackie Christianson  : 1970  MRN: [id number]  Referring provider: DEV Mena  Dx:   Encounter Diagnosis     ICD-10-CM    1  Cervical radiculopathy  M54 12    2  Neck pain on left side  M54 2        Start Time: 1730  Stop Time: 1837  Total time in clinic (min): 67 minutes    Subjective: Reports she has some neck tightness at start of today's session  Objective: See treatment diary below      Assessment: Tolerated treatment well  Occasional verbal cuing required to maintain chin tuck when performed with lifts with good ability to incorporate feedback into performance  Introduced levator scap stretch to decreased soft tissue tension with good tolerance  No reports of pain throughout session and noted good status at end of visit  Patient demonstrated fatigue post treatment, exhibited good technique with therapeutic exercises and would benefit from continued PT  Plan: Continue per plan of care  Progress treatment as tolerated         Precautions: N/A      Manuals 6/21 6/22  5/27 6/1 6/3 6/7 6/10 6/17   SOR    Done Done done Done Done  Done    Manual cervical distraction            UT STM Done  Done   Done Done Done  Done Done  Done                Neuro Re-Ed                        Median nerve glides D*                        UT stretch :10x10 :10x10   nv :10x10 :10x10 :10x10 :10x10   Levator scap stretch  :05x10                      DNF 2x10  2x12 w/ lift  2x8 2x10 2x10  2x10 2x10  2x10   Tests and measures       10'     Ther Ex            UBE Retro 15W 5' Retro 15W 5'    Retro 10W 5' Retro 15W 5' retro 15W 5' Retro 15W 5'               Prone chin tucks*    dc        Prone I w CT* 2# 3x10 2# 3x10  1# 2x10 1# 3x10 1# 3x10 1# 3x12 1# 3x12 1# 3x12 inc wt nv   Prone T w CT 2# 3x10  2# 3x10  2x10 1# 2x10 1# 3x10 1# 3x10 1# 3x12 1# 3x12 inc wt nv   Prone Ys            TB ER w retraction    OTB 3x10 OTB 3x12 OTB 3x15 GTB 3x10 GTB 3x10 GTB 3x12 Pinky prone 10x10''    nv nv 10x10" nv 10x10''   Foam roller series  10 ea 10 ea  20 ea 10 ea 10 ea nv 10 ea 10 ea   S hook                        TB mid rows w CT  GTB 3x12  OTB 2x10 OTB 3x10  OTB 3x10 OTB 3x12 nv GTB 3x10   TB horizontal abduction PTB 3x10  PTB 3x10          Ther Activity                                    Gait Training                                    Modalities            MHP 10' 10'  10' 10' 10' 10' 10' 10'   Motor TENS L UT  45" on  15" off  2 pps  200 US 10'   Lv 20  10' Lv 25 10' 10' 10' 10' 10'

## 2021-06-28 ENCOUNTER — OFFICE VISIT (OUTPATIENT)
Dept: PHYSICAL THERAPY | Facility: REHABILITATION | Age: 51
End: 2021-06-28
Payer: COMMERCIAL

## 2021-06-28 DIAGNOSIS — M54.2 NECK PAIN ON LEFT SIDE: ICD-10-CM

## 2021-06-28 DIAGNOSIS — M54.12 CERVICAL RADICULOPATHY: Primary | ICD-10-CM

## 2021-06-28 PROCEDURE — 97112 NEUROMUSCULAR REEDUCATION: CPT | Performed by: PHYSICAL THERAPIST

## 2021-06-28 PROCEDURE — 97110 THERAPEUTIC EXERCISES: CPT | Performed by: PHYSICAL THERAPIST

## 2021-06-28 PROCEDURE — 97140 MANUAL THERAPY 1/> REGIONS: CPT | Performed by: PHYSICAL THERAPIST

## 2021-06-28 NOTE — PROGRESS NOTES
Daily Note     Today's date: 2021  Patient name: Qiana Cruz  : 1970  MRN: [id number]  Referring provider: DEV Guzmán  Dx:   Encounter Diagnosis     ICD-10-CM    1  Cervical radiculopathy  M54 12    2  Neck pain on left side  M54 2        Start Time: 1530  Stop Time: 1620  Total time in clinic (min): 50 minutes    Subjective: Patient reports increased soreness today  This may be due to golfing over the weekend  Objective: See treatment diary below      Assessment: Patient tolerated treatment well and was able to increase resistance during scapular PREs  However, min increase in UT compensation observed with onset of fatigue  Progress postural strengthening, as tolerated  Plan: Continue per plan of care        Precautions: N/A      Manuals 6/21 6/22 6/28   6/3 6/7 6/10 6/17   SOR      done Done Done  Done    Manual cervical distraction            UT STM Done  Done  Done   Done  Done Done  Done                Neuro Re-Ed                        Median nerve glides D*                        UT stretch :10x10 :10x10 :10 x10   :10x10 :10x10 :10x10 :10x10   Levator scap stretch  :05x10 :10x10                     DNF 2x10  2x12 w/ lift 2x12 w/ lift   2x10  2x10 2x10  2x10   Tests and measures       10'     Ther Ex            UBE Retro 15W 5' Retro 15W 5' Retro 15W 5'   Retro 10W 5' Retro 15W 5' retro 15W 5' Retro 15W 5'               Prone chin tucks*            Prone I w CT* 2# 3x10 2# 3x10 2# 3x10   1# 3x10 1# 3x12 1# 3x12 1# 3x12 inc wt nv   Prone T w CT 2# 3x10  2# 3x10 2# 3x10   1# 3x10 1# 3x10 1# 3x12 1# 3x12 inc wt nv   Prone Ys            TB ER w retraction      OTB 3x15 GTB 3x10 GTB 3x10 GTB 3x12               Pinky prone 10x10''     nv 10x10" nv 10x10''   Foam roller series  10 ea 10 ea 10 ea   10 ea nv 10 ea 10 ea   S hook                        TB mid rows w CT  GTB 3x12 GTB 3x12   OTB 3x10 OTB 3x12 nv GTB 3x10   TB horizontal abduction PTB 3x10  PTB 3x10 GTB 3x10         Ther Activity                                    Gait Training                                    Modalities            [facility] 10' 10' 10'  10' 10' 10' 10' 10'   Motor TENS L UT  45" on  15" off  2 pps  200 US 10'  10'  Lv 25 10' 10' 10' 10' 10'

## 2021-06-30 ENCOUNTER — OFFICE VISIT (OUTPATIENT)
Dept: PHYSICAL THERAPY | Facility: REHABILITATION | Age: 51
End: 2021-06-30
Payer: COMMERCIAL

## 2021-06-30 DIAGNOSIS — M54.2 NECK PAIN ON LEFT SIDE: ICD-10-CM

## 2021-06-30 DIAGNOSIS — M54.12 CERVICAL RADICULOPATHY: Primary | ICD-10-CM

## 2021-06-30 PROCEDURE — 97112 NEUROMUSCULAR REEDUCATION: CPT | Performed by: PHYSICAL THERAPIST

## 2021-06-30 PROCEDURE — 97110 THERAPEUTIC EXERCISES: CPT | Performed by: PHYSICAL THERAPIST

## 2021-06-30 NOTE — PROGRESS NOTES
Daily Note     Today's date: 2021  Patient name: Arianne Escobar  : 1970  MRN: [id number]  Referring provider: DEV Montez  Dx:   Encounter Diagnosis     ICD-10-CM    1  Cervical radiculopathy  M54 12    2  Neck pain on left side  M54 2        Start Time: 1750  Stop Time: 1830  Total time in clinic (min): 40 minutes    Subjective: Patient reports decreased frequency of left arm numbness  She notes some left upper trap muscle soreness after playing golf last week  Objective: See treatment diary below      Assessment: Tolerated treatment well  Good tolerance to progressions of postural strengthening  Will benefit from addition of core and trunk stabilization  Patient demonstrated fatigue post treatment, exhibited good technique with therapeutic exercises and would benefit from continued PT  Plan: Continue per plan of care        Precautions: N/A      Manuals 6/21 6/22 6/28 6/30  6/3 6/7 6/10 6/17   SOR      done Done Done  Done    Manual cervical distraction            UT STM Done  Done  Done   Done  Done Done  Done                Neuro Re-Ed                        Median nerve glides D*                        UT stretch :10x10 :10x10 :10x10   :10x10 :10x10 :10x10 :10x10   Levator scap stretch  :05x10 :10x10 :10x10                    DNF 2x10  2x12 w/ lift 2x12 w/ lift 3x10 w lift  2x10  2x10 2x10  2x10   Tests and measures       10'     Ther Ex            UBE Retro 15W 5' Retro 15W 5' Retro 15W 5' Retro 15W 5'  Retro 10W 5' Retro 15W 5' retro 15W 5' Retro 15W 5'   TAC marches    nv        Prone super narendra    nv                                Prone chin tucks*            Prone I w CT* 2# 3x10 2# 3x10 2# 3x10 2# 3x12  1# 3x10 1# 3x12 1# 3x12 1# 3x12 inc wt nv   Prone T w CT 2# 3x10  2# 3x10 2# 3x10 2# 3x12  1# 3x10 1# 3x10 1# 3x12 1# 3x12 inc wt nv   Prone Ys            TB ER w retraction      OTB 3x15 GTB 3x10 GTB 3x10 GTB 3x12               Pinky prone 10x10''   10x10"  nv 10x10" nv 10x10'' Foam roller series  10 ea 10 ea 10 ea 10 ea  10 ea nv 10 ea 10 ea   S hook                        TB mid rows w CT  GTB 3x12 GTB 3x12 GTB 3x12  OTB 3x10 OTB 3x12 nv GTB 3x10   TB horizontal abduction PTB 3x10  PTB 3x10 GTB 3x10 GTB         Ther Activity                                    Gait Training                                    Modalities            MHP 10' 10' 10'  10' 10' 10' 10' 10'   Motor TENS L UT  45" on  15" off  2 pps  200 US 10'  10'  Lv 25 10' 10' 10' 10' 10'

## 2021-07-07 ENCOUNTER — EVALUATION (OUTPATIENT)
Dept: PHYSICAL THERAPY | Facility: REHABILITATION | Age: 51
End: 2021-07-07
Payer: COMMERCIAL

## 2021-07-07 DIAGNOSIS — M54.12 CERVICAL RADICULOPATHY: ICD-10-CM

## 2021-07-07 DIAGNOSIS — M54.2 NECK PAIN ON LEFT SIDE: Primary | ICD-10-CM

## 2021-07-07 PROCEDURE — 97112 NEUROMUSCULAR REEDUCATION: CPT | Performed by: PHYSICAL THERAPIST

## 2021-07-07 PROCEDURE — 97110 THERAPEUTIC EXERCISES: CPT | Performed by: PHYSICAL THERAPIST

## 2021-07-07 NOTE — PROGRESS NOTES
PT Re-Evaluation     Today's date: 2021  Patient name: Kamini Lazo  : 1970  MRN: [id number]  Referring provider: DEV Villafana  Dx:   Encounter Diagnosis     ICD-10-CM    1  Neck pain on left side  M54 2    2  Cervical radiculopathy  M54 12        Start Time:   Stop Time: 180  Total time in clinic (min): 63 minutes    Assessment  Assessment details: Candy Kuhn has made the following improvements since beginning PT: decreased pain, increased ROM, increased strength, increased postural control and awareness and increased tolerance to activities  Candy Kuhn demonstrates good improvements in cervical range of motion and has achieved equal cervical side-bending bilaterally with overall pain free range of motion  Candy Kuhn continues to present with the impairments as listed above  Patient would continue to benefit from skilled PT to address these deficits and to maximize function  Candy Kuhn will be appropriate for discharge to comprehensive [facility] for postural and thoracic strengthening after next visit  Impairments: abnormal or restricted ROM, activity intolerance, impaired physical strength, lacks appropriate home exercise program, pain with function and poor posture   Understanding of Dx/Px/POC: good   Prognosis: good    Goals  ST  Patient will report 50% decrease in left arm N/T  (met)  2  Patient will demonstrate 50% improvement in postural strength  (met)    LT  Patient will be independent with HEP  (progressing)  2  Patient will report no pain while sleeping at night to improve rest and overall quality of life  (met)  3  Patient will be able to lift overhead without limitation from LUE symptoms  (progressing)      Plan  Plan details: Thank you for opportunity to participate in the care of Kamini Lazo      Patient would benefit from: PT eval and skilled physical therapy  Planned modality interventions: cryotherapy, unattended electrical stimulation and TENS  Planned therapy interventions: activity modification, flexibility, home exercise program, therapeutic exercise, therapeutic activities, stretching, strengthening, postural training, patient education, neuromuscular re-education, manual therapy and joint mobilization  Frequency: 2x week  Duration in visits: 10  Plan of Care beginning date: 7/7/2021  Treatment plan discussed with: patient        Subjective Evaluation    History of Present Illness  Mechanism of injury: Tamie Cr has been seen for a total of 14 visits for OP PT for neck pain and left hand numbness  Patient rates overall improvement since beginning PT 70%  Patient's global rating of change is " Quite a bit better (5) " Patient reports improvements with minimal frequency of left hand numbness  Patient reports most difficulty with some soreness in the left upper trap with activities such as lifting/pushing  Tamie Cr has been seen for total of 7 visits for OP PT for neck pain and left hand numbness  Patient rates overall improvement since beginning PT 50%  Patient's global rating of change is " Moderately better (4) " Patient reports improvements with 50% decreased frequency of numbness  She also reports decreased tenderness to the left neck  Patient would like to achieve no numbness in the left arm  She notes continued difficulty with pushing a stretcher at work due to fear that she may aggravate symptoms  Patient is a 49 y o  presenting to physical therapy with left sided neck pain and radicular symptoms including upper trap, upper arm and forearm pain on dorsal side  She reports numbness and tingling to the left hand  Her symptoms were improved by a previous course of PT but they recently returned  Reports no issues with her range of motion  Patient being referred by Alpa Quinones        N/T/Radicular pain: numbness/tingling to 3rd and 4th digit  Previous Injury: None  Imaging: None, has MRI scheduled in June  Sleeping position: supine   Occupation: nurse anesthetist; does a lot of lifting     PT goals: "No pain, no tingling through the exercise  Prefer non-invasive treatment "          Recurrent probem    Quality of life: good    Pain  Current pain ratin  At best pain ratin  At worst pain ratin  Quality: radiating  Relieving factors: change in position  Aggravating factors: overhead activity, standing and walking    Social Support    Employment status: working  Hand dominance: right    Treatments  Previous treatment: physical therapy and massage  Current treatment: massage and physical therapy  Patient Goals  Patient goals for therapy: decreased pain, increased motion, increased strength and return to sport/leisure activities          Objective     Concurrent Complaints  Negative for night pain, disturbed sleep, dizziness, faints, headaches, nausea/motion sickness, tinnitus, trouble swallowing, difficulty breathing, shortness of breath, respiratory pain, visual change, cardiac problem, kidney problem, gallbladder problem, stomach problem, ulcer, appendix problem, spleen problem, pancreas problem, history of cancer, history of trauma and infection    Postural Observations  Seated posture: fair  Standing posture: fair        Palpation   Left   Tenderness of the upper trapezius  Trigger point to upper trapezius  Tenderness   Cervical Spine   No tenderness in the left transverse process and right transverse process       Neurological Testing     Sensation   Cervical/Thoracic   Left   Intact: light touch    Right   Intact: light touch    Active Range of Motion   Cervical/Thoracic Spine       Cervical    Flexion: 55 degrees   Extension: 40 degrees      Left lateral flexion: 40 degrees     with pain  Right lateral flexion: 45 degrees      Left rotation:  WFL  Right rotation:  [facility]    Strength/Myotome Testing   Cervical Spine   Neck extension: 5  Neck flexion: 5    Left   Interossei strength (t1): 5    Right   Interossei strength (t1): 5    Left Shoulder     Planes of Motion Flexion: 4+   Abduction: 5     Isolated Muscles   Lower trapezius: 3   Middle trapezius: 3   Upper trapezius: 5     Right Shoulder     Planes of Motion   Flexion: 4+   Abduction: 5     Isolated Muscles   Lower trapezius: 3   Middle trapezius: 3   Upper trapezius: 5     Left Elbow   Flexion: 5  Extension: 5    Right Elbow   Flexion: 5  Extension: 5    Left Wrist/Hand   Thumb extension: 5    Right Wrist/Hand   Thumb extension: 5    Tests   Cervical   Positive vertical compression and lumbar distraction test     Left   Positive Spurling's Test A  Right   Negative Spurling's Test A  Left Shoulder   Negative ULTT1  Right Shoulder   Negative ULTT1  Lumbar   Positive vertical compression        General Comments:    Upper quarter screen   Shoulder: unremarkable  Elbow: unremarkable  Hand/wrist: unremarkable    Shoulder Comments   Shoulder ROM WFL  Neuro Exam:     Headaches   Patient reports headaches: No               Precautions: N/A      Manuals 6/21 6/22 6/28 6/30 7/7  6/7 6/10 6/17   SOR       Done Done  Done    Manual cervical distraction            UT STM Done  Done  Done    Done Done  Done                Neuro Re-Ed                        Median nerve glides D*                        UT stretch :10x10 :10x10 :10x10    :10x10 :10x10 :10x10   Levator scap stretch  :05x10 :10x10 :10x10        Re-evaluation     12'       DNF 2x10  2x12 w/ lift 2x12 w/ lift 3x10 w lift 3x10 w lift  2x10 2x10  2x10   Tests and measures       10'     Ther Ex            UBE Retro 15W 5' Retro 15W 5' Retro 15W 5' Retro 15W 5' Retro 15W 5'  Retro 15W 5' retro 15W 5' Retro 15W 5'   TAC marches    nv 3x10       Prone super narendra    nv 2x10                               Prone chin tucks*            Prone I w CT* 2# 3x10 2# 3x10 2# 3x10 2# 3x12   1# 3x12 1# 3x12 1# 3x12 inc wt nv   Prone T w CT 2# 3x10  2# 3x10 2# 3x10 2# 3x12   1# 3x10 1# 3x12 1# 3x12 inc wt nv   Prone Ys            TB ER w retraction       GTB 3x10 GTB 3x10 GTB 3x12               Pinky prone 10x10''   10x10" 10x10"   10x10" nv 10x10''   Foam roller series  10 ea 10 ea 10 ea 10 ea np  nv 10 ea 10 ea   S hook                        TB mid rows w CT  GTB 3x12 GTB 3x12 GTB 3x12 BTB 3x10  OTB 3x12 nv GTB 3x10   TB horizontal abduction PTB 3x10  PTB 3x10 GTB 3x10 GTB  GTB 2x10       Ther Activity                                    Gait Training                                    Modalities            MHP 10' 10' 10'    10' 10' 10'   Motor TENS L UT  45" on  15" off  2 pps  200 US 10'  10'  15'  10' 10' 10'

## 2021-07-08 ENCOUNTER — TELEPHONE (OUTPATIENT)
Dept: PREADMISSION TESTING | Facility: HOSPITAL | Age: 51
End: 2021-07-08

## 2021-07-08 ENCOUNTER — OFFICE VISIT (OUTPATIENT)
Dept: PHYSICAL THERAPY | Facility: REHABILITATION | Age: 51
End: 2021-07-08
Payer: COMMERCIAL

## 2021-07-08 VITALS — WEIGHT: 107 LBS | HEIGHT: 61 IN | BODY MASS INDEX: 20.2 KG/M2

## 2021-07-08 DIAGNOSIS — M54.12 CERVICAL RADICULOPATHY: ICD-10-CM

## 2021-07-08 DIAGNOSIS — M54.2 NECK PAIN ON LEFT SIDE: Primary | ICD-10-CM

## 2021-07-08 PROCEDURE — 97112 NEUROMUSCULAR REEDUCATION: CPT

## 2021-07-08 PROCEDURE — 97140 MANUAL THERAPY 1/> REGIONS: CPT

## 2021-07-08 PROCEDURE — 97110 THERAPEUTIC EXERCISES: CPT

## 2021-07-08 RX ORDER — DIPHENOXYLATE HYDROCHLORIDE AND ATROPINE SULFATE 2.5; .025 MG/1; MG/1
1 TABLET ORAL DAILY
COMMUNITY

## 2021-07-08 NOTE — PROGRESS NOTES
PT Discharge    Today's date: 2021  Patient name: Evonne Henry  : 1970  MRN: [id number]  Referring provider: DEV Newman  Dx:   Encounter Diagnosis     ICD-10-CM    1  Neck pain on left side  M54 2    2  Cervical radiculopathy  M54 12        Start Time: 3737  Stop Time: 1830  Total time in clinic (min): 58 minutes    Assessment  Assessment details: Zee Perdomo has made the following improvements since beginning PT: decreased pain, increased ROM, increased strength and increased postural control and awareness  Zee Perdomo and PT mutually agree to transition to HEP at this time secondary to gains made in PT  She has been given updated HEP with verbalized understanding and compliance  Zee Radhatodd is encouraged to contact PT with any questions or concerns in the future  Objective measures from re-evaluation performed 2021  Subjective    Objective     Concurrent Complaints  Negative for night pain, disturbed sleep, dizziness, faints, headaches, nausea/motion sickness, tinnitus, trouble swallowing, difficulty breathing, shortness of breath, respiratory pain, visual change, cardiac problem, kidney problem, gallbladder problem, stomach problem, ulcer, appendix problem, spleen problem, pancreas problem, history of cancer, history of trauma and infection    Postural Observations  Seated posture: fair  Standing posture: fair        Palpation   Left   Tenderness of the upper trapezius  Trigger point to upper trapezius  Tenderness   Cervical Spine   No tenderness in the left transverse process and right transverse process       Neurological Testing     Sensation   Cervical/Thoracic   Left   Intact: light touch    Right   Intact: light touch    Active Range of Motion   Cervical/Thoracic Spine       Cervical    Flexion: 55 degrees   Extension: 40 degrees      Left lateral flexion: 40 degrees     with pain  Right lateral flexion: 45 degrees      Left rotation:  WFL  Right rotation:  [facility]    Strength/Myotome Testing Cervical Spine   Neck extension: 5  Neck flexion: 5    Left   Interossei strength (t1): 5    Right   Interossei strength (t1): 5    Left Shoulder     Planes of Motion   Flexion: 4+   Abduction: 5     Isolated Muscles   Lower trapezius: 3   Middle trapezius: 3   Upper trapezius: 5     Right Shoulder     Planes of Motion   Flexion: 4+   Abduction: 5     Isolated Muscles   Lower trapezius: 3   Middle trapezius: 3   Upper trapezius: 5     Left Elbow   Flexion: 5  Extension: 5    Right Elbow   Flexion: 5  Extension: 5    Left Wrist/Hand   Thumb extension: 5    Right Wrist/Hand   Thumb extension: 5    Tests   Cervical   Positive vertical compression and lumbar distraction test     Left   Positive Spurling's Test A  Right   Negative Spurling's Test A  Left Shoulder   Negative ULTT1  Right Shoulder   Negative ULTT1  Lumbar   Positive vertical compression        General Comments:    Upper quarter screen   Shoulder: unremarkable  Elbow: unremarkable  Hand/wrist: unremarkable    Shoulder Comments   Shoulder ROM WFL  Neuro Exam:     Headaches   Patient reports headaches: No

## 2021-07-08 NOTE — PROGRESS NOTES
Daily Note     Today's date: 2021  Patient name: Wellington Marrero  : 1970  MRN: [id number]  Referring provider: DEV Morillo  Dx:   Encounter Diagnosis     ICD-10-CM    1  Neck pain on left side  M54 2    2  Cervical radiculopathy  M54 12        Start Time: 3854  Stop Time: 1830  Total time in clinic (min): 58 minutes    Subjective: Patient reporting feeling "good" at start of session  Objective: See treatment diary below      Assessment: Tolerated treatment well  Patient demonstrated fatigue post treatment and exhibited good technique with therapeutic exercises      Plan: Patient given updated HEP this session with patient reporting understanding  Patient to transition to HEP and will continue to follow up with patient as appropriate        Precautions: N/A      Manuals 6/21 6/22 6/28 6/30 7/7 7/8 6/7 6/10 6/17   SOR       Done Done  Done    Manual cervical distraction            UT STM Done  Done  Done   done Done Done  Done                Neuro Re-Ed                        Median nerve glides D*                        UT stretch :10x10 :10x10 :10x10    :10x10 :10x10 :10x10   Levator scap stretch  :05x10 :10x10 :10x10        Re-evaluation     12'       DNF 2x10  2x12 w/ lift 2x12 w/ lift 3x10 w lift 3x10 w lift 3x10 w/ lift 2x10 2x10  2x10   Tests and measures       10'     Ther Ex            UBE Retro 15W 5' Retro 15W 5' Retro 15W 5' Retro 15W 5' Retro 15W 5' Retro 15W 5' Retro 15W 5' retro 15W 5' Retro 15W 5'   TAC marches    nv 3x10 3x10      Prone super narendra    nv 2x10 2x10                              Prone chin tucks*            Prone I w CT* 2# 3x10 2# 3x10 2# 3x10 2# 3x12  2# 3x12 1# 3x12 1# 3x12 1# 3x12 inc wt nv   Prone T w CT 2# 3x10  2# 3x10 2# 3x10 2# 3x12  2# 3x12 1# 3x10 1# 3x12 1# 3x12 inc wt nv   Prone Ys            TB ER w retraction       GTB 3x10 GTB 3x10 GTB 3x12               Pinky prone 10x10''   10x10" 10x10"  10x10'' 10x10" nv 10x10''   Foam roller series  10 ea 10 ea 10 ea 10 ea np  nv 10 ea 10 ea   S hook                        TB mid rows w CT  GTB 3x12 GTB 3x12 GTB 3x12 BTB 3x10 BTB 3x10 OTB 3x12 nv GTB 3x10   TB horizontal abduction PTB 3x10  PTB 3x10 GTB 3x10 GTB  GTB 2x10 GTB 2x10      Ther Activity                                    Gait Training                                    Modalities            P 10' 10' 10'    10' 10' 10'   Motor TENS L UT  45" on  15" off  2 pps  200 US 10'  10'  15' 15' 10' 10' 10'

## 2021-07-08 NOTE — PRE-PROCEDURE INSTRUCTIONS
Pre-Surgery Instructions:   Medication Instructions    B Complex Vitamins (VITAMIN B COMPLEX PO) Instructed patient per Anesthesia Guidelines   ciclopirox (LOPROX) 0 77 % cream Instructed patient per Anesthesia Guidelines   multivitamin (THERAGRAN) TABS Pt to stop one week prior    Na Sulfate-K Sulfate-Mg Sulf 17 5-3 13-1 6 GM/177ML SOLN Patient was instructed by Physician and understands   olopatadine (PATANOL) 0 1 % ophthalmic solution Instructed patient per Anesthesia Guidelines   VITAMIN D PO Instructed patient per Anesthesia Guidelines  Pt to follow Dr Severo Daunt instructions    information to be given on procedure day

## 2021-07-15 ENCOUNTER — HOSPITAL ENCOUNTER (OUTPATIENT)
Dept: GASTROENTEROLOGY | Facility: AMBULARY SURGERY CENTER | Age: 51
Setting detail: OUTPATIENT SURGERY
Discharge: HOME | End: 2021-07-15
Attending: INTERNAL MEDICINE
Payer: COMMERCIAL

## 2021-07-15 ENCOUNTER — ANESTHESIA (OUTPATIENT)
Dept: GASTROENTEROLOGY | Facility: AMBULARY SURGERY CENTER | Age: 51
End: 2021-07-15

## 2021-07-15 ENCOUNTER — ANESTHESIA EVENT (OUTPATIENT)
Dept: GASTROENTEROLOGY | Facility: AMBULARY SURGERY CENTER | Age: 51
End: 2021-07-15

## 2021-07-15 VITALS
RESPIRATION RATE: 16 BRPM | WEIGHT: 107 LBS | BODY MASS INDEX: 20.2 KG/M2 | HEART RATE: 65 BPM | DIASTOLIC BLOOD PRESSURE: 58 MMHG | OXYGEN SATURATION: 98 % | SYSTOLIC BLOOD PRESSURE: 100 MMHG | HEIGHT: 61 IN | TEMPERATURE: 97.7 F

## 2021-07-15 DIAGNOSIS — Z12.11 SCREENING FOR COLON CANCER: ICD-10-CM

## 2021-07-15 DIAGNOSIS — K21.9 GASTROESOPHAGEAL REFLUX DISEASE, UNSPECIFIED WHETHER ESOPHAGITIS PRESENT: ICD-10-CM

## 2021-07-15 LAB
EXT PREGNANCY TEST URINE: NEGATIVE
EXT. CONTROL: NORMAL

## 2021-07-15 PROCEDURE — 43239 EGD BIOPSY SINGLE/MULTIPLE: CPT | Performed by: INTERNAL MEDICINE

## 2021-07-15 PROCEDURE — 81025 URINE PREGNANCY TEST: CPT | Performed by: ANESTHESIOLOGY

## 2021-07-15 PROCEDURE — 88305 TISSUE EXAM BY PATHOLOGIST: CPT | Performed by: PATHOLOGY

## 2021-07-15 PROCEDURE — 45385 COLONOSCOPY W/LESION REMOVAL: CPT | Performed by: INTERNAL MEDICINE

## 2021-07-15 RX ORDER — PROPOFOL 10 MG/ML
INJECTION, EMULSION INTRAVENOUS CONTINUOUS PRN
Status: DISCONTINUED | OUTPATIENT
Start: 2021-07-15 | End: 2021-07-15

## 2021-07-15 RX ORDER — LIDOCAINE HYDROCHLORIDE 20 MG/ML
INJECTION, SOLUTION EPIDURAL; INFILTRATION; INTRACAUDAL; PERINEURAL AS NEEDED
Status: DISCONTINUED | OUTPATIENT
Start: 2021-07-15 | End: 2021-07-15

## 2021-07-15 RX ORDER — PROPOFOL 10 MG/ML
INJECTION, EMULSION INTRAVENOUS AS NEEDED
Status: DISCONTINUED | OUTPATIENT
Start: 2021-07-15 | End: 2021-07-15

## 2021-07-15 RX ORDER — SODIUM CHLORIDE, SODIUM LACTATE, POTASSIUM CHLORIDE, CALCIUM CHLORIDE 600; 310; 30; 20 MG/100ML; MG/100ML; MG/100ML; MG/100ML
125 INJECTION, SOLUTION INTRAVENOUS CONTINUOUS
Status: DISCONTINUED | OUTPATIENT
Start: 2021-07-15 | End: 2021-07-19 | Stop reason: HOSPADM

## 2021-07-15 RX ORDER — ONDANSETRON 2 MG/ML
4 INJECTION INTRAMUSCULAR; INTRAVENOUS ONCE AS NEEDED
Status: CANCELLED | OUTPATIENT
Start: 2021-07-15

## 2021-07-15 RX ADMIN — SODIUM CHLORIDE, SODIUM LACTATE, POTASSIUM CHLORIDE, AND CALCIUM CHLORIDE 125 ML/HR: .6; .31; .03; .02 INJECTION, SOLUTION INTRAVENOUS at 08:48

## 2021-07-15 RX ADMIN — PROPOFOL 120 MCG/KG/MIN: 10 INJECTION, EMULSION INTRAVENOUS at 09:17

## 2021-07-15 RX ADMIN — PROPOFOL 50 MG: 10 INJECTION, EMULSION INTRAVENOUS at 09:17

## 2021-07-15 RX ADMIN — LIDOCAINE HYDROCHLORIDE 100 MG: 20 INJECTION, SOLUTION EPIDURAL; INFILTRATION; INTRACAUDAL; PERINEURAL at 09:15

## 2021-07-15 RX ADMIN — PROPOFOL 100 MG: 10 INJECTION, EMULSION INTRAVENOUS at 09:15

## 2021-07-15 NOTE — ANESTHESIA PREPROCEDURE EVALUATION
Procedure:  COLONOSCOPY  EGD    Relevant Problems   ANESTHESIA (within normal limits)      CARDIO (within normal limits)      ENDO (within normal limits)      GI/HEPATIC   (+) Esophageal reflux      PULMONARY (within normal limits)        Physical Exam    Airway    Mallampati score: I  TM Distance: >3 FB  Neck ROM: full     Dental   No notable dental hx     Cardiovascular  Cardiovascular exam normal    Pulmonary  Pulmonary exam normal     Other Findings        Anesthesia Plan  ASA Score- 1     Anesthesia Type- IV sedation with anesthesia with ASA Monitors  Additional Monitors:   Airway Plan:           Plan Factors-Exercise tolerance (METS): >4 METS  Chart reviewed  EKG reviewed  Existing labs reviewed  Patient summary reviewed  Patient is not a current smoker  Induction- intravenous  Postoperative Plan-     Informed Consent- Anesthetic plan and risks discussed with patient  I personally reviewed this patient with the CRNA  Discussed and agreed on the Anesthesia Plan with the CRNA  Ej Galaviz

## 2021-07-15 NOTE — H&P
History and Physical -  [facility]  Umm Baig 49 y o  female MRN: [id number]    HPI: Umm Baig is a 49y o  year old female who presents with GERD and colon cancer screening  Review of Systems    Historical Information   Past Medical History:   Diagnosis Date    COVID-19 vaccine series completed     12/19/20 and 1/9/21-in EPIC    GERD (gastroesophageal reflux disease)     Upper respiratory tract infection 3/23/2020    She does not meet criteria for covid testing, although I feel she should not return to work at this time until her symptoms have improved   Vitamin D deficiency      Past Surgical History:   Procedure Laterality Date    DENTAL SURGERY  05/2017    abscess     Social History   Social History     Substance and Sexual Activity   Alcohol Use Yes    Comment: social     Social History     Substance and Sexual Activity   Drug Use Never     Social History     Tobacco Use   Smoking Status Never Smoker   Smokeless Tobacco Never Used     Family History   Problem Relation Age of Onset    Diabetes Mother     Hypertension Mother     Thyroid nodules Mother     Diabetes Father     Hypertension Father     Tuberculosis Father     Cancer Father         bladder    No Known Problems Maternal Aunt     No Known Problems Maternal Aunt     No Known Problems Maternal Aunt     No Known Problems Maternal Aunt     No Known Problems Paternal Aunt     No Known Problems Paternal Aunt     No Known Problems Paternal Aunt        Meds/Allergies     (Not in a hospital admission)      No Known Allergies    Objective     /62   Pulse 62   Temp 97 7 °F (36 5 °C) (Tympanic)   Resp 16   Ht 5' 1" (1 549 m)   Wt 48 5 kg (107 lb)   SpO2 100%   BMI 20 22 kg/m²       PHYSICAL EXAM    Gen: NAD  CV: RRR  CHEST: Clear  ABD: soft, NT/ND  EXT: no edema  Neuro: AAO      ASSESSMENT/PLAN:  This is a 49y o  year old female here for GERD and colon cancer screening         PLAN:   Procedure: egd/colonoscopy

## 2021-07-15 NOTE — ANESTHESIA POSTPROCEDURE EVALUATION
Post-Op Assessment Note    CV Status:  Stable  Pain Score: 0    Pain management: adequate     Mental Status:  Sleepy   Hydration Status:  Stable   PONV Controlled:  None   Airway Patency:  Patent   Two or more mitigation strategies used for obstructive sleep apnea   Post Op Vitals Reviewed: Yes      Staff: CRNA         No complications documented      BP   135/74   Temp 97   Pulse 65   Resp 16   SpO2 99

## 2021-07-28 ENCOUNTER — TELEPHONE (OUTPATIENT)
Dept: FAMILY MEDICINE CLINIC | Facility: CLINIC | Age: 51
End: 2021-07-28

## 2021-07-28 NOTE — TELEPHONE ENCOUNTER
DR DUFF     Patient called and wants a letter stating her massage is medically necessary for her HSA to cover it  Please advise

## 2021-07-28 NOTE — LETTER
July 29, 2021     Patient: Erika Gonsalez   YOB: 1970   Date of Visit: 7/28/2021       To Whom it May Concern:    Erika Gonsalez is under my professional care  Massages are medically necessary  If you have any questions or concerns, please don't hesitate to call           Sincerely,          Akash Singh DO       CC: No Recipients

## 2021-07-29 NOTE — TELEPHONE ENCOUNTER
New letter done  I sent it to her MyChart  Please let her know it was done    Thank you,  Alvarado Paris, DO

## 2021-08-16 ENCOUNTER — TELEPHONE (OUTPATIENT)
Dept: FAMILY MEDICINE CLINIC | Facility: CLINIC | Age: 51
End: 2021-08-16

## 2021-08-16 DIAGNOSIS — Z11.9 ENCOUNTER FOR SCREENING FOR INFECTIOUS AND PARASITIC DISEASES, UNSPECIFIED: Primary | ICD-10-CM

## 2021-08-16 NOTE — TELEPHONE ENCOUNTER
Josiah Burgos is calling to get an order for an antibody test   She stated she is going to call her insurance company to see if it is covered    Can we place order up front      Thank you

## 2021-09-05 LAB — SARS-COV-2 IGG SERPL IA-ACNC: <1 INDEX

## 2021-09-28 ENCOUNTER — IMMUNIZATIONS (OUTPATIENT)
Dept: FAMILY MEDICINE CLINIC | Facility: HOSPITAL | Age: 51
End: 2021-09-28

## 2021-09-28 DIAGNOSIS — Z23 ENCOUNTER FOR IMMUNIZATION: Primary | ICD-10-CM

## 2021-09-28 PROCEDURE — 0001A SARS-COV-2 / COVID-19 MRNA VACCINE (PFIZER-BIONTECH) 30 MCG: CPT

## 2021-09-28 PROCEDURE — 91300 SARS-COV-2 / COVID-19 MRNA VACCINE (PFIZER-BIONTECH) 30 MCG: CPT

## 2022-01-03 ENCOUNTER — OFFICE VISIT (OUTPATIENT)
Dept: FAMILY MEDICINE CLINIC | Facility: CLINIC | Age: 52
End: 2022-01-03
Payer: COMMERCIAL

## 2022-01-03 VITALS
OXYGEN SATURATION: 97 % | DIASTOLIC BLOOD PRESSURE: 68 MMHG | RESPIRATION RATE: 16 BRPM | SYSTOLIC BLOOD PRESSURE: 98 MMHG | HEART RATE: 64 BPM | WEIGHT: 112 LBS | HEIGHT: 61 IN | BODY MASS INDEX: 21.14 KG/M2 | TEMPERATURE: 99.4 F

## 2022-01-03 DIAGNOSIS — Z11.4 SCREENING FOR HIV (HUMAN IMMUNODEFICIENCY VIRUS): ICD-10-CM

## 2022-01-03 DIAGNOSIS — M62.81 MUSCLE WEAKNESS: ICD-10-CM

## 2022-01-03 DIAGNOSIS — Z13.6 SCREENING FOR CARDIOVASCULAR CONDITION: ICD-10-CM

## 2022-01-03 DIAGNOSIS — Z00.00 ANNUAL PHYSICAL EXAM: Primary | ICD-10-CM

## 2022-01-03 DIAGNOSIS — Z79.899 ENCOUNTER FOR LONG-TERM CURRENT USE OF MEDICATION: ICD-10-CM

## 2022-01-03 DIAGNOSIS — R73.01 IMPAIRED FASTING GLUCOSE: ICD-10-CM

## 2022-01-03 DIAGNOSIS — Z11.59 NEED FOR HEPATITIS C SCREENING TEST: ICD-10-CM

## 2022-01-03 DIAGNOSIS — Z13.0 SCREENING FOR DEFICIENCY ANEMIA: ICD-10-CM

## 2022-01-03 DIAGNOSIS — E55.9 VITAMIN D DEFICIENCY: ICD-10-CM

## 2022-01-03 DIAGNOSIS — Z23 NEED FOR VACCINATION: ICD-10-CM

## 2022-01-03 PROCEDURE — 3008F BODY MASS INDEX DOCD: CPT | Performed by: FAMILY MEDICINE

## 2022-01-03 PROCEDURE — 99396 PREV VISIT EST AGE 40-64: CPT | Performed by: FAMILY MEDICINE

## 2022-01-03 PROCEDURE — 90715 TDAP VACCINE 7 YRS/> IM: CPT

## 2022-01-03 PROCEDURE — 1036F TOBACCO NON-USER: CPT | Performed by: FAMILY MEDICINE

## 2022-01-03 PROCEDURE — 90471 IMMUNIZATION ADMIN: CPT

## 2022-01-09 LAB — HIV 1+2 AB+HIV1 P24 AG SERPL QL IA: NON REACTIVE

## 2022-01-16 LAB
25(OH)D3+25(OH)D2 SERPL-MCNC: 42.5 NG/ML (ref 30–100)
ACHR BIND AB SER-SCNC: <0.03 NMOL/L (ref 0–0.24)
ALBUMIN SERPL-MCNC: 4.7 G/DL (ref 3.8–4.8)
ALBUMIN/GLOB SERPL: 1.6 {RATIO} (ref 1.2–2.2)
ALP SERPL-CCNC: 68 IU/L (ref 44–121)
ALT SERPL-CCNC: 29 IU/L (ref 0–32)
AST SERPL-CCNC: 26 IU/L (ref 0–40)
BILIRUB SERPL-MCNC: 0.2 MG/DL (ref 0–1.2)
BUN SERPL-MCNC: 15 MG/DL (ref 6–24)
BUN/CREAT SERPL: 22 (ref 9–23)
CALCIUM SERPL-MCNC: 9.6 MG/DL (ref 8.7–10.2)
CHLORIDE SERPL-SCNC: 105 MMOL/L (ref 96–106)
CHOLEST SERPL-MCNC: 231 MG/DL (ref 100–199)
CO2 SERPL-SCNC: 24 MMOL/L (ref 20–29)
CREAT SERPL-MCNC: 0.67 MG/DL (ref 0.57–1)
ERYTHROCYTE [DISTWIDTH] IN BLOOD BY AUTOMATED COUNT: 13.3 % (ref 11.7–15.4)
EST. AVERAGE GLUCOSE BLD GHB EST-MCNC: 134 MG/DL
GLOBULIN SER-MCNC: 3 G/DL (ref 1.5–4.5)
GLUCOSE SERPL-MCNC: 109 MG/DL (ref 65–99)
HBA1C MFR BLD: 6.3 % (ref 4.8–5.6)
HCT VFR BLD AUTO: 41.6 % (ref 34–46.6)
HCV AB S/CO SERPL IA: <0.1 S/CO RATIO (ref 0–0.9)
HDLC SERPL-MCNC: 86 MG/DL
HGB BLD-MCNC: 14.1 G/DL (ref 11.1–15.9)
LDLC SERPL CALC-MCNC: 132 MG/DL (ref 0–99)
LDLC/HDLC SERPL: 1.5 RATIO (ref 0–3.2)
MAGNESIUM SERPL-MCNC: 2.3 MG/DL (ref 1.6–2.3)
MCH RBC QN AUTO: 30.9 PG (ref 26.6–33)
MCHC RBC AUTO-ENTMCNC: 33.9 G/DL (ref 31.5–35.7)
MCV RBC AUTO: 91 FL (ref 79–97)
MICRODELETION SYND BLD/T FISH: NORMAL
MUSK AB SER IA-ACNC: <1 U/ML
PLATELET # BLD AUTO: 253 X10E3/UL (ref 150–450)
POTASSIUM SERPL-SCNC: 4.4 MMOL/L (ref 3.5–5.2)
PROT SERPL-MCNC: 7.7 G/DL (ref 6–8.5)
RBC # BLD AUTO: 4.57 X10E6/UL (ref 3.77–5.28)
SL AMB EGFR AFRICAN AMERICAN: 119 ML/MIN/1.73
SL AMB EGFR NON AFRICAN AMERICAN: 103 ML/MIN/1.73
SL AMB VLDL CHOLESTEROL CALC: 13 MG/DL (ref 5–40)
SODIUM SERPL-SCNC: 142 MMOL/L (ref 134–144)
TRIGL SERPL-MCNC: 77 MG/DL (ref 0–149)
TSH SERPL DL<=0.005 MIU/L-ACNC: 1.72 UIU/ML (ref 0.45–4.5)
VIT B12 SERPL-MCNC: 963 PG/ML (ref 232–1245)
WBC # BLD AUTO: 4.1 X10E3/UL (ref 3.4–10.8)

## 2022-01-17 DIAGNOSIS — E78.00 HYPERCHOLESTEROLEMIA: ICD-10-CM

## 2022-01-17 DIAGNOSIS — R73.01 IMPAIRED FASTING GLUCOSE: Primary | ICD-10-CM

## 2022-04-08 ENCOUNTER — TELEPHONE (OUTPATIENT)
Dept: FAMILY MEDICINE CLINIC | Facility: CLINIC | Age: 52
End: 2022-04-08

## 2022-04-11 ENCOUNTER — CLINICAL SUPPORT (OUTPATIENT)
Dept: FAMILY MEDICINE CLINIC | Facility: CLINIC | Age: 52
End: 2022-04-11
Payer: COMMERCIAL

## 2022-04-11 DIAGNOSIS — Z23 NEED FOR VACCINATION: Primary | ICD-10-CM

## 2022-04-11 PROCEDURE — 90471 IMMUNIZATION ADMIN: CPT

## 2022-04-11 PROCEDURE — 90750 HZV VACC RECOMBINANT IM: CPT

## 2022-05-15 LAB
ALBUMIN SERPL-MCNC: 4.7 G/DL (ref 3.8–4.9)
ALBUMIN/GLOB SERPL: 1.7 {RATIO} (ref 1.2–2.2)
ALP SERPL-CCNC: 67 IU/L (ref 44–121)
ALT SERPL-CCNC: 14 IU/L (ref 0–32)
AST SERPL-CCNC: 25 IU/L (ref 0–40)
BILIRUB SERPL-MCNC: 0.5 MG/DL (ref 0–1.2)
BUN SERPL-MCNC: 12 MG/DL (ref 6–24)
BUN/CREAT SERPL: 18 (ref 9–23)
CALCIUM SERPL-MCNC: 9.5 MG/DL (ref 8.7–10.2)
CHLORIDE SERPL-SCNC: 101 MMOL/L (ref 96–106)
CHOLEST SERPL-MCNC: 235 MG/DL (ref 100–199)
CO2 SERPL-SCNC: 22 MMOL/L (ref 20–29)
CREAT SERPL-MCNC: 0.67 MG/DL (ref 0.57–1)
EGFR: 106 ML/MIN/1.73
EST. AVERAGE GLUCOSE BLD GHB EST-MCNC: 134 MG/DL
GLOBULIN SER-MCNC: 2.7 G/DL (ref 1.5–4.5)
GLUCOSE SERPL-MCNC: 89 MG/DL (ref 65–99)
HBA1C MFR BLD: 6.3 % (ref 4.8–5.6)
HDLC SERPL-MCNC: 92 MG/DL
LDLC SERPL CALC-MCNC: 134 MG/DL (ref 0–99)
LDLC/HDLC SERPL: 1.5 RATIO (ref 0–3.2)
MICRODELETION SYND BLD/T FISH: NORMAL
POTASSIUM SERPL-SCNC: 4 MMOL/L (ref 3.5–5.2)
PROT SERPL-MCNC: 7.4 G/DL (ref 6–8.5)
SL AMB VLDL CHOLESTEROL CALC: 9 MG/DL (ref 5–40)
SODIUM SERPL-SCNC: 142 MMOL/L (ref 134–144)
TRIGL SERPL-MCNC: 53 MG/DL (ref 0–149)

## 2022-05-16 ENCOUNTER — TELEPHONE (OUTPATIENT)
Dept: FAMILY MEDICINE CLINIC | Facility: CLINIC | Age: 52
End: 2022-05-16

## 2022-05-16 DIAGNOSIS — Z13.6 SCREENING FOR CARDIOVASCULAR CONDITION: ICD-10-CM

## 2022-05-16 DIAGNOSIS — R73.01 IMPAIRED FASTING GLUCOSE: Primary | ICD-10-CM

## 2022-05-16 DIAGNOSIS — Z13.0 SCREENING FOR DEFICIENCY ANEMIA: ICD-10-CM

## 2022-05-16 NOTE — TELEPHONE ENCOUNTER
----- Message from Lukasz Boykin sent at 5/16/2022 10:06 AM EDT -----  Regarding: FW: Test result    ----- Message -----  From: You Stockton  Sent: 5/16/2022   9:34 AM EDT  To: THE [facility] Clinical  Subject: Test result                                      Dr Rl Monteiro, this time  I will do very strict diet without any sweet , ice cream, exercise regularly and fast some time  I really want to improve my lab result , do you think we can try again without medication, and you can kindly prescribe me a lipid panel and hemoglobin a1c three months later? Thanks a lot    Sincerely, savannah

## 2022-08-16 ENCOUNTER — OFFICE VISIT (OUTPATIENT)
Dept: FAMILY MEDICINE CLINIC | Facility: CLINIC | Age: 52
End: 2022-08-16
Payer: COMMERCIAL

## 2022-08-16 VITALS
HEART RATE: 77 BPM | SYSTOLIC BLOOD PRESSURE: 92 MMHG | RESPIRATION RATE: 16 BRPM | OXYGEN SATURATION: 98 % | BODY MASS INDEX: 20.39 KG/M2 | HEIGHT: 61 IN | DIASTOLIC BLOOD PRESSURE: 60 MMHG | TEMPERATURE: 98.3 F | WEIGHT: 108 LBS

## 2022-08-16 DIAGNOSIS — Z12.31 BREAST CANCER SCREENING BY MAMMOGRAM: ICD-10-CM

## 2022-08-16 DIAGNOSIS — T75.3XXA MOTION SICKNESS, INITIAL ENCOUNTER: ICD-10-CM

## 2022-08-16 DIAGNOSIS — K21.00 GASTROESOPHAGEAL REFLUX DISEASE WITH ESOPHAGITIS WITHOUT HEMORRHAGE: ICD-10-CM

## 2022-08-16 DIAGNOSIS — R10.12 LUQ PAIN: Primary | ICD-10-CM

## 2022-08-16 PROCEDURE — 3725F SCREEN DEPRESSION PERFORMED: CPT | Performed by: FAMILY MEDICINE

## 2022-08-16 PROCEDURE — 99214 OFFICE O/P EST MOD 30 MIN: CPT | Performed by: FAMILY MEDICINE

## 2022-08-16 RX ORDER — SUCRALFATE 1 G/1
1 TABLET ORAL 4 TIMES DAILY
Qty: 40 TABLET | Refills: 0 | Status: SHIPPED | OUTPATIENT
Start: 2022-08-16

## 2022-08-16 RX ORDER — AMOXICILLIN 500 MG/1
CAPSULE ORAL
COMMUNITY
Start: 2022-05-16 | End: 2022-08-16

## 2022-08-16 RX ORDER — SCOLOPAMINE TRANSDERMAL SYSTEM 1 MG/1
PATCH, EXTENDED RELEASE TRANSDERMAL
Qty: 8 PATCH | Refills: 0 | Status: SHIPPED | OUTPATIENT
Start: 2022-08-16

## 2022-08-16 NOTE — PROGRESS NOTES
Assessment/Plan:    No problem-specific Assessment & Plan notes found for this encounter  Abd pain  R/o gastritis, duodenitis  Check US abdomen  H pylori antigen  Trial carafate  Possible GI re-eval    GERD unchanged  Cont PPI    Planning trip to [facility]  Wants motion sickness patch  21 day trip  Patch rx with instructions     Diagnoses and all orders for this visit:    LUQ pain  -     US abdomen complete; Future  -     H  pylori antigen, stool; Future    Gastroesophageal reflux disease with esophagitis without hemorrhage  -     sucralfate (CARAFATE) 1 g tablet; Take 1 tablet (1 g total) by mouth 4 (four) times a day    Motion sickness, initial encounter  -     scopolamine (TRANSDERM-SCOP) 1 mg/3 days TD 72 hr patch; Apply one patch to dry skin behind ear every 3 days, start 4hrs before trip    Breast cancer screening by mammogram  -     Mammo screening bilateral w 3d & cad; Future    Other orders  -     Discontinue: amoxicillin (AMOXIL) 500 mg capsule; take 1 capsule by mouth three times a day with food until finished (Patient not taking: Reported on 8/16/2022)        Return if symptoms worsen or fail to improve  Subjective:      Patient ID: Shi Griffiths is a 47 y o  female  Chief Complaint   Patient presents with    Abdominal Pain     Carmita Lovelace MA         HPI  1m  On/off  LUQ dull pain  Gi is Dr Enrique Carrero  Worse in afternoon  Better with rest  No food trigger or alleviator  No physical trigger  No blood in stool or diarrhea  Urine is ok  Small amount of wt loss  No n/v usually  EGD bx showed mcuosa with intestinal metaplasia July 2021    The following portions of the patient's history were reviewed and updated as appropriate: allergies, current medications, past family history, past medical history, past social history, past surgical history and problem list     Review of Systems   Constitutional: Negative for unexpected weight change  Gastrointestinal: Positive for abdominal pain   Negative for blood in stool and diarrhea  Current Outpatient Medications   Medication Sig Dispense Refill    B Complex Vitamins (VITAMIN B COMPLEX PO) Take by mouth daily      multivitamin (THERAGRAN) TABS Take 1 tablet by mouth daily      pantoprazole (PROTONIX) 40 mg tablet Take 1 tablet (40 mg total) by mouth daily 30 tablet 6    scopolamine (TRANSDERM-SCOP) 1 mg/3 days TD 72 hr patch Apply one patch to dry skin behind ear every 3 days, start 4hrs before trip 8 patch 0    sucralfate (CARAFATE) 1 g tablet Take 1 tablet (1 g total) by mouth 4 (four) times a day 40 tablet 0    VITAMIN D PO Take by mouth daily       No current facility-administered medications for this visit  Objective:    BP 92/60   Pulse 77   Temp 98 3 °F (36 8 °C)   Resp 16   Ht 5' 1" (1 549 m)   Wt 49 kg (108 lb)   SpO2 98%   BMI 20 41 kg/m²        Physical Exam  Vitals and nursing note reviewed  Constitutional:       General: She is not in acute distress  Appearance: She is well-developed  She is not ill-appearing  HENT:      Head: Normocephalic  Right Ear: Tympanic membrane normal       Left Ear: Tympanic membrane normal       Mouth/Throat:      Mouth: Mucous membranes are moist       Pharynx: Oropharynx is clear  No pharyngeal swelling or oropharyngeal exudate  Eyes:      General: No scleral icterus  Conjunctiva/sclera: Conjunctivae normal    Cardiovascular:      Rate and Rhythm: Normal rate and regular rhythm  Pulmonary:      Effort: Pulmonary effort is normal  No respiratory distress  Breath sounds: No wheezing  Abdominal:      General: Bowel sounds are normal  There is no distension  Palpations: Abdomen is soft  There is no mass  Tenderness: There is abdominal tenderness  There is no guarding or rebound  Musculoskeletal:         General: No deformity  Cervical back: Neck supple  Skin:     General: Skin is warm and dry  Coloration: Skin is not jaundiced or pale     Neurological: Mental Status: She is alert  Psychiatric:         Mood and Affect: Mood normal          Behavior: Behavior normal          Thought Content:  Thought content normal                 Hanna Melissa DO

## 2022-08-26 ENCOUNTER — TELEPHONE (OUTPATIENT)
Dept: FAMILY MEDICINE CLINIC | Facility: CLINIC | Age: 52
End: 2022-08-26

## 2022-08-26 DIAGNOSIS — Z71.89 ENCOUNTER FOR HERB AND VITAMIN SUPPLEMENT MANAGEMENT: Primary | ICD-10-CM

## 2022-08-26 RX ORDER — B-COMPLEX WITH VITAMIN C
1 TABLET ORAL DAILY
Qty: 90 TABLET | Refills: 3 | Status: SHIPPED | OUTPATIENT
Start: 2022-08-26

## 2022-08-26 RX ORDER — CHOLECALCIFEROL (VITAMIN D3) 50 MCG
2000 TABLET ORAL DAILY
Qty: 90 TABLET | Refills: 3 | Status: SHIPPED | OUTPATIENT
Start: 2022-08-26

## 2022-08-26 RX ORDER — CALCIUM GLUCONATE 45(500) MG
500 TABLET ORAL DAILY
Qty: 90 TABLET | Refills: 3 | Status: SHIPPED | OUTPATIENT
Start: 2022-08-26

## 2022-08-26 RX ORDER — MAGNESIUM 200 MG
200 TABLET ORAL DAILY
Qty: 90 TABLET | Refills: 3 | Status: SHIPPED | OUTPATIENT
Start: 2022-08-26

## 2022-08-26 NOTE — TELEPHONE ENCOUNTER
----- Message from Silvakhushi Sammy sent at 8/25/2022  9:23 PM EDT -----  Regarding: Calcium supplements   Thank you so much Dr Gabi Serrano, I am taking calcium 500mg and magnesium 200mg daily  If it is ok, would you please also add vit d 2000 unit a day, vit B complex 1 pill a day for my eye twitch ? Your prescription is really appreciated  Have a nice weekend     Sincerely,   Price Camejo

## 2022-09-03 ENCOUNTER — HOSPITAL ENCOUNTER (OUTPATIENT)
Dept: RADIOLOGY | Facility: HOSPITAL | Age: 52
Discharge: HOME | End: 2022-09-03
Attending: FAMILY MEDICINE
Payer: COMMERCIAL

## 2022-09-03 DIAGNOSIS — R10.12 LUQ PAIN: ICD-10-CM

## 2022-09-03 PROCEDURE — 76700 US EXAM ABDOM COMPLETE: CPT

## 2022-09-06 DIAGNOSIS — R10.12 LUQ PAIN: ICD-10-CM

## 2022-09-06 DIAGNOSIS — R93.5 ABNORMAL ULTRASOUND OF ABDOMEN: Primary | ICD-10-CM

## 2022-10-03 ENCOUNTER — HOSPITAL ENCOUNTER (OUTPATIENT)
Dept: RADIOLOGY | Facility: HOSPITAL | Age: 52
Discharge: HOME | End: 2022-10-03
Attending: FAMILY MEDICINE
Payer: COMMERCIAL

## 2022-10-03 DIAGNOSIS — R10.12 LUQ PAIN: ICD-10-CM

## 2022-10-03 DIAGNOSIS — R93.5 ABNORMAL ULTRASOUND OF ABDOMEN: ICD-10-CM

## 2022-10-03 PROCEDURE — 74160 CT ABDOMEN W/CONTRAST: CPT

## 2022-10-03 PROCEDURE — G1004 CDSM NDSC: HCPCS

## 2022-10-03 RX ADMIN — IOHEXOL 100 ML: 350 INJECTION, SOLUTION INTRAVENOUS at 14:57

## 2022-10-03 NOTE — LETTER
[address]  [address] [address]      October 12, 2022    MRN: [id number]     Phone: [phone]     Dear Ms Lazarus Em,    You recently had a(n) Cat Scan performed on 10/3/2022 at  [facility] that was requested by Jaya Cunningham DO  The study was reviewed by a radiologist, which is a physician who specializes in medical imaging  The radiologist issued a report describing his or her findings  In that report there was a finding that the radiologist felt warranted further discussion with your health care provider and that discussion would be beneficial to you  The results were sent to Jaya Cunningham DO on 10/06/2022  3:21 PM  We recommend that you contact Jaya Cunningham DO at [phone] or set up an appointment to discuss the results of the imaging test  If you have already heard from Jaya Cunningham DO regarding the results of your study, you can disregard this letter  This letter is not meant to alarm you, but intended to encourage you to follow-up on your results with the provider that sent you for the imaging study  In addition, we have enclosed answers to frequently asked questions by other patients who have also received a letter to review results with their health care provider (see page two)  Thank you for choosing [facility] for your medical imaging needs  FREQUENTLY ASKED QUESTIONS    1  Why am I receiving this letter? [address] [place] [address] requires us to notify patients who have findings on imaging exams that may require more testing or follow-up with a health professional within the next 3 months          2  How serious is the finding on the imaging test?  This letter is sent to all patients who may need follow-up or more testing within the next 3 months  Receiving this letter does not necessarily mean you have a life-threatening imaging finding or disease  Recommendations in the radiologist’s imaging report are general in nature and it is up to your healthcare provider to say whether those recommendations make sense for your situation  You are strongly encouraged to talk to your health care provider about the results and ask whether additional steps need to be taken  3  Where can I get a copy of the final report for my recent radiology exam?  To get a full copy of the report you can access your records online at [email or web address] or please contact [facility] Medical Records Department at [phone] Monday through Friday between 8 am and 6 pm          4  What do I need to do now? Please contact your health care provider who requested the imaging study to discuss what further actions (if any) are needed  You may have already heard from (your ordering provider) in regard to this test in which case you can disregard this letter  NOTICE IN ACCORDANCE WITH THE [place] STATE “PATIENT TEST RESULT INFORMATION ACT OF 2018”    You are receiving this notice as a result of a determination by your diagnostic imaging service that further discussions of your test results are warranted and would be beneficial to you  The complete results of your test or tests have been or will be sent to the health care practitioner that ordered the test or tests  It is recommended that you contact your health care practitioner to discuss your results as soon as possible

## 2022-10-04 ENCOUNTER — TELEPHONE (OUTPATIENT)
Dept: RADIOLOGY | Facility: HOSPITAL | Age: 52
End: 2022-10-04

## 2022-10-04 NOTE — PROGRESS NOTES
Came to assess patient after completion of her CT study as she had notable symptoms of allergic reaction after CT contrast injection of Omnipaque  At time of initial encounter with patient she reports nausea & vomiting immediately after injection, and delayed topical reaction (20-30 minutes post imaging) of topical hives, pruritis, and bilateral lower extremity fluid filled blistering of skin  Vital signs were assessed x4 in 10 min increments during patient observation  @15:20; /56, HR 74, oxygen saturation 98%, + topical rash/ hives, absence of wheezes or respiratory symptoms  @ 15:32; BP 90/55, HR 68, oxygen saturation 98%, + topical rash/ hives, absence of wheezes or respiratory symptoms  @ 15:42; BP 97/59, HR 73, oxygen saturation 98%, + topical rash/ hives, absence of wheezes or respiratory symptoms  @15:52 /62, HR 74, oxygen saturation 98%, + topical rash/ hives, absence of wheezes or respiratory symptoms  Patient was also assessed by Radiologist Dr Archana Hewitt at time of initial encounter  No medications were provided at time of symptom onset as patient would be driving herself home  Patient was provided 50mg of oral Benadryl and instructed to take dose immediately upon returning to her home, so that she would not have any drowsiness while operating vehicle  Patient educated to come back for immediate attention should new/ or worsening symptoms occur

## 2022-10-04 NOTE — PROGRESS NOTES
Dizzy, lightheaded, vomiting since this morning   FAMILY PRACTICE HEALTH MAINTENANCE OFFICE VISIT  [facility] - [address]    NAME: Markie New  AGE: 49 y o  SEX: female  : 1970     DATE: 1/3/2022    Assessment and Plan     1  Annual physical exam    2  Vitamin D deficiency  -     Vitamin D 25 hydroxy; Future  -     Vitamin D 25 hydroxy    3  Muscle weakness  -     Acetylcholine Receptor (AChR) Binding Antibodies with Reflex to MuSK Antibodies; Future  -     Acetylcholine Receptor (AChR) Binding Antibodies with Reflex to MuSK Antibodies    4  Impaired fasting glucose  -     Hemoglobin A1C; Future  -     Hemoglobin A1C    5  Encounter for long-term current use of medication  -     Vitamin B12; Future  -     TSH, 3rd generation; Future  -     Magnesium; Future  -     Vitamin B12  -     TSH, 3rd generation  -     Magnesium    6  Screening for cardiovascular condition  -     Comprehensive metabolic panel; Future  -     Lipid Panel with Direct LDL reflex; Future  -     Comprehensive metabolic panel  -     Lipid Panel with Direct LDL reflex    7  Screening for deficiency anemia  -     CBC; Future  -     CBC    8  Need for hepatitis C screening test  -     Hepatitis C Antibody ([facility]); Future  -     Hepatitis C Antibody ([facility])    9  Screening for HIV (human immunodeficiency virus)  -     [facility]- Human Immunodeficiency Virus 1/2 Antigen / Antibody ( Fourth Generation) with Reflex Testing;  Future        · Patient Counseling:   · Nutrition: Stressed importance of a well balanced diet, moderation of sodium/saturated fat, caloric balance and sufficient intake of fiber  · Exercise: Stressed the importance of regular exercise with a goal of 150 minutes per week  · Dental Health: Discussed daily flossing and brushing and regular dental visits     · Immunizations reviewed: will get Shingles vaccine   · Discussed benefits of:  Colon Cancer Screening, Mammogram , Cervical Cancer screening and Screening labs    BMI Counseling: Body mass index is 21 16 kg/m²  Discussed with patient's BMI with her  Return in about 1 year (around 1/3/2023) for Annual physical and needs nursing appointment for first Shingrix vaccine   Chief Complaint     Chief Complaint   Patient presents with    Physical Exam     sas/cma       History of Present Illness     She is feeling well  Well Adult Physical   Patient here for a comprehensive physical exam       Diet and Physical Activity  Diet: well balanced diet  Exercise: intermittently         General Health  Hearing: Normal:  bilateral  Vision: no vision problems  Dental: regular dental visits    Reproductive Health  last period was May of 2020      The following portions of the patient's history were reviewed and updated as appropriate: allergies, current medications, past family history, past medical history, past social history, past surgical history and problem list     Review of Systems     Review of Systems   Constitutional: Negative  Respiratory: Negative  Cardiovascular: Negative  Past Medical History     Past Medical History:   Diagnosis Date    COVID-19 vaccine series completed     12/19/20 and 1/9/21-in EPIC    GERD (gastroesophageal reflux disease)     Upper respiratory tract infection 3/23/2020    She does not meet criteria for covid testing, although I feel she should not return to work at this time until her symptoms have improved        Vitamin D deficiency        Past Surgical History     Past Surgical History:   Procedure Laterality Date    DENTAL SURGERY  05/2017    abscess       Social History     Social History     Socioeconomic History    Marital status:      Spouse name: None    Number of children: None    Years of education: None    Highest education level: None   Occupational History    None   Tobacco Use    Smoking status: Never Smoker    Smokeless tobacco: Never Used   Vaping Use    Vaping Use: Never used Substance and Sexual Activity    Alcohol use: Yes     Alcohol/week: 1 0 standard drink     Types: 1 Glasses of wine per week     Comment: social    Drug use: Never    Sexual activity: None   Other Topics Concern    None   Social History Narrative        Per [employer]    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[employer]     Social Determinants of Health     Financial Resource Strain: Not on file   Food Insecurity: Not on file   Transportation Needs: Not on file   Physical Activity: Not on file   Stress: Not on file   Social Connections: Not on file   Intimate Partner Violence: Not on file   Housing Stability: Not on file       Family History     Family History   Problem Relation Age of Onset    Diabetes Mother     Hypertension Mother     Thyroid nodules Mother     Diabetes Father     Hypertension Father     Tuberculosis Father     Cancer Father         bladder    No Known Problems Maternal Aunt     No Known Problems Maternal Aunt     No Known Problems Maternal Aunt     No Known Problems Maternal Aunt     No Known Problems Paternal Aunt     No Known Problems Paternal Aunt     No Known Problems Paternal Aunt        Current Medications       Current Outpatient Medications:     B Complex Vitamins (VITAMIN B COMPLEX PO), Take by mouth daily, Disp: , Rfl:     multivitamin (THERAGRAN) TABS, Take 1 tablet by mouth daily, Disp: , Rfl:     pantoprazole (PROTONIX) 40 mg tablet, Take 1 tablet (40 mg total) by mouth daily, Disp: 30 tablet, Rfl: 6    VITAMIN D PO, Take by mouth daily, Disp: , Rfl:     Na Sulfate-K Sulfate-Mg Sulf 17 5-3 13-1 6 GM/177ML SOLN, Take 1 applicator by mouth once for 1 dose, Disp: 177 mL, Rfl: 0     Allergies     No Known Allergies    Objective     BP 98/68   Pulse 64   Temp 99 4 °F (37 4 °C)   Resp 16   Ht 5' 1" (1 549 m)   Wt 50 8 kg (112 lb)   LMP 05/23/2021 (Exact Date)   SpO2 97%   BMI 21 16 kg/m²      Physical Exam  Vitals and nursing note reviewed  Constitutional:       Appearance: She is well-developed  HENT:      Head: Normocephalic and atraumatic  Right Ear: Tympanic membrane and external ear normal       Left Ear: Tympanic membrane and external ear normal    Cardiovascular:      Rate and Rhythm: Normal rate and regular rhythm  Heart sounds: Normal heart sounds  No murmur heard  No friction rub  Pulmonary:      Effort: Pulmonary effort is normal  No respiratory distress  Breath sounds: Normal breath sounds  No wheezing or rales  Abdominal:      General: There is no distension  Palpations: Abdomen is soft  There is no mass  Tenderness: There is no abdominal tenderness  There is no guarding or rebound  Musculoskeletal:      Cervical back: Normal range of motion  Right lower leg: No edema  Left lower leg: No edema  Skin:     General: Skin is warm  Neurological:      Mental Status: She is alert and oriented to person, place, and time     Psychiatric:         Mood and Affect: Mood normal             Visual Acuity Screening    Right eye Left eye Both eyes   Without correction:      With correction: 20/25 20/20 20/25           Blanche Fisher, [address]

## 2022-10-05 ENCOUNTER — HOSPITAL ENCOUNTER (OUTPATIENT)
Dept: RADIOLOGY | Facility: HOSPITAL | Age: 52
Discharge: HOME | End: 2022-10-05
Attending: FAMILY MEDICINE
Payer: COMMERCIAL

## 2022-10-05 VITALS — HEIGHT: 61 IN | WEIGHT: 105 LBS | BODY MASS INDEX: 19.83 KG/M2

## 2022-10-05 DIAGNOSIS — Z12.31 BREAST CANCER SCREENING BY MAMMOGRAM: ICD-10-CM

## 2022-10-05 PROCEDURE — 77063 BREAST TOMOSYNTHESIS BI: CPT

## 2022-10-05 PROCEDURE — 77067 SCR MAMMO BI INCL CAD: CPT

## 2022-10-06 DIAGNOSIS — K86.9 LESION OF PANCREAS: Primary | ICD-10-CM

## 2022-10-13 ENCOUNTER — TELEPHONE (OUTPATIENT)
Dept: FAMILY MEDICINE CLINIC | Facility: CLINIC | Age: 52
End: 2022-10-13

## 2022-10-13 NOTE — TELEPHONE ENCOUNTER
[facility] [address]    [address]    [facility] [address]    [facility] [address]    Phone:  [phone]    Fax:  [phone]       Thank You for choosing us for your health care visit. We are glad to serve you and happy to provide you with this summary of your visit. Please help us to ensure we have accurate records. If you find anything that needs to be changed, please let our staff know as soon as possible.          Your Demographic Information     Patient Name Sex Alexia Montgomery Female 1959       Ethnic Group Patient Race    Not of  or  Origin White      Your Visit Details     Date & Time Provider Department    2017 3:00 PM Yonathan Portillo DO [facility] [address]      Your Upcoming Appointment*(Max 10)     Friday May 05, 2017  4:00 PM CDT   MAMM SCREENING with EDGAR MAMMO 1   [facility]  Breast Imaging ([facility])    [address] [facility] [id number] [phone]              9:00 AM CDT   New Patient Visit with Tamela Hebert MD   [facility] ([facility])    [address] [facility] [address] [phone]              Your To Do List     Future Orders Please Complete On or Around Expires    HEPATITIS C ANTIBODY WITH REFLEX      COMPREHENSIVE METABOLIC PANEL  Matthias 10, 2018 Mar 09, 2018      We Ordered or Performed the Following     SERVICE TO DERMATOLOGY       Conditions Discussed Today or Order-Related Diagnoses        Comments    Benign hypertension    -  Primary     Need for hepatitis C screening test         Skin tag           Your Vitals Were     BP Pulse Temp Resp Height Weight    120/82 48 98.4 °F (36.9 °C) (Tympanic) 16 5' 4\" (1.626 m) 183 lb 1.6 oz (83.1 kg)    BMI Smoking Status                31.43 kg/m2 Former Smoker          Medications Prescribed or Re-Ordered Today     hydrochlorothiazide (HYDRODIURIL) 25 MG tablet    Sig  Pt calling looking for a form that was to be faxed to us for her upcoming MRI  She is allergic to the contrast dye  Radiology stated they faxed a form for Dr Eliazar Rees to complete for her to do a prep before MRI due to her allergy  I do not see form in media  Can we check solarity and call pt to advise if we received it or not  Her MRI is scheduled for 10/16/22  Please advise  - Route: Take 1 tablet by mouth daily. - Oral    Class: Eprescribe    Pharmacy: [facility] [address] [place] [address] #: [phone]      Your Current Medications Are        Disp Refills Start End    hydrochlorothiazide (HYDRODIURIL) 25 MG tablet 90 tablet 3 4/13/2017     Sig - Route: Take 1 tablet by mouth daily. - Oral    Class: Eprescribe    Multiple Vitamins-Minerals (MULTIVITAMIN ADULTS 50+ PO)        Sig - Route: Take 1 tablet by mouth daily. - Oral    Class: Historical Med    aspirin 81 MG tablet 90 tablet 3 3/16/2017     Sig - Route: Take 1 tablet by mouth daily. - Oral    Class: OTC      Allergies     Aleve RASH      Immunizations History as of 4/13/2017     Name Date    Influenza 10/17/2016    Tdap 11/16/2010      Problem List as of 4/13/2017     Benign hypertension    Blood in the stool              Patient Instructions    Lab Hours:  Monday-Thursday 7-6  Friday 7-5  Saturday 7-12  Do not eat any food or drink any beverages for 12 hours before having the testing done. You may have water only; NO candy, gum, mints, coffee, soda or juice.  Please take all medications as usual. Do not drink any alcoholic beverages for 24 hours prior to testing.    If your physician has ordered laboratory or radiological testing as a part of your ongoing plan of care, please allow 5-7 business days for the results to be sent and reviewed by your provider. If your results are critical and require more immediate intervention, you will be contacted sooner. Your results will be conveyed via phone call or letter.    If you need a refill on your prescription, please call your pharmacy and let them know. Please be proactive and call before your medication runs out. The pharmacy will then contact us for a refill. Please allow 24-48 hours for the refill to be processed.    In the next few weeks you may receive a Press Ganey survey regarding your most recent clinic visit with us.  Please take a few moments out  of your day to accurately evaluate your visit.  We strive to provide you with the best medical care.  Again, thank you for your time and we look forward to your next visit.         If we need to contact you regarding any test results, we will make 2 attempts to reach you at the number you have listed during your office visit today. If we are unable to reach you, a letter with your results and any further instructions will be mailed to you home.        Follow-up with Dr. Portillo in 11 months      [facility]  Dr. Meghan Chappell  [address] [place] [address]  P: [phone]  F: [phone]    Dr. Odilon Corona  [address] [place] [address]  P: [phone]    Minna Nieto NP  [address] [address]  P: [phone]    F: [phone]

## 2022-10-14 DIAGNOSIS — Z91.041 ALLERGY TO IMAGING CONTRAST MEDIA: Primary | ICD-10-CM

## 2022-10-14 RX ORDER — METHYLPREDNISOLONE 32 MG/1
TABLET ORAL
Qty: 2 TABLET | Refills: 0 | Status: SHIPPED | OUTPATIENT
Start: 2022-10-14

## 2022-10-16 ENCOUNTER — HOSPITAL ENCOUNTER (OUTPATIENT)
Dept: MRI IMAGING | Facility: HOSPITAL | Age: 52
Discharge: HOME | End: 2022-10-16
Payer: COMMERCIAL

## 2022-10-16 DIAGNOSIS — K86.9 LESION OF PANCREAS: ICD-10-CM

## 2022-10-16 PROCEDURE — A9585 GADOBUTROL INJECTION: HCPCS | Performed by: FAMILY MEDICINE

## 2022-10-16 PROCEDURE — 74183 MRI ABD W/O CNTR FLWD CNTR: CPT

## 2022-10-16 PROCEDURE — G1004 CDSM NDSC: HCPCS

## 2022-10-16 RX ADMIN — GADOBUTROL 5 ML: 604.72 INJECTION INTRAVENOUS at 14:54

## 2022-10-17 ENCOUNTER — PREP FOR PROCEDURE (OUTPATIENT)
Dept: GASTROENTEROLOGY | Facility: CLINIC | Age: 52
End: 2022-10-17

## 2022-10-17 DIAGNOSIS — K86.2 PANCREATIC CYST: Primary | ICD-10-CM

## 2022-10-18 DIAGNOSIS — R93.5 ABNORMAL MRI, PELVIS: Primary | ICD-10-CM

## 2022-10-19 ENCOUNTER — HOSPITAL ENCOUNTER (OUTPATIENT)
Dept: GASTROENTEROLOGY | Facility: HOSPITAL | Age: 52
Setting detail: OUTPATIENT SURGERY
Discharge: HOME | End: 2022-10-19
Attending: INTERNAL MEDICINE
Payer: COMMERCIAL

## 2022-10-19 ENCOUNTER — ANESTHESIA (OUTPATIENT)
Dept: GASTROENTEROLOGY | Facility: HOSPITAL | Age: 52
End: 2022-10-19

## 2022-10-19 ENCOUNTER — ANESTHESIA EVENT (OUTPATIENT)
Dept: GASTROENTEROLOGY | Facility: HOSPITAL | Age: 52
End: 2022-10-19

## 2022-10-19 VITALS
HEIGHT: 61 IN | DIASTOLIC BLOOD PRESSURE: 80 MMHG | TEMPERATURE: 97 F | RESPIRATION RATE: 15 BRPM | WEIGHT: 104 LBS | HEART RATE: 70 BPM | SYSTOLIC BLOOD PRESSURE: 131 MMHG | BODY MASS INDEX: 19.63 KG/M2 | OXYGEN SATURATION: 100 %

## 2022-10-19 DIAGNOSIS — K86.2 PANCREATIC CYST: ICD-10-CM

## 2022-10-19 PROCEDURE — 88112 CYTOPATH CELL ENHANCE TECH: CPT | Performed by: PATHOLOGY

## 2022-10-19 PROCEDURE — 88305 TISSUE EXAM BY PATHOLOGIST: CPT | Performed by: PATHOLOGY

## 2022-10-19 RX ORDER — FENTANYL CITRATE 50 UG/ML
INJECTION, SOLUTION INTRAMUSCULAR; INTRAVENOUS AS NEEDED
Status: DISCONTINUED | OUTPATIENT
Start: 2022-10-19 | End: 2022-10-19

## 2022-10-19 RX ORDER — SODIUM CHLORIDE, SODIUM LACTATE, POTASSIUM CHLORIDE, CALCIUM CHLORIDE 600; 310; 30; 20 MG/100ML; MG/100ML; MG/100ML; MG/100ML
INJECTION, SOLUTION INTRAVENOUS CONTINUOUS PRN
Status: DISCONTINUED | OUTPATIENT
Start: 2022-10-19 | End: 2022-10-19

## 2022-10-19 RX ORDER — CEFAZOLIN SODIUM 2 G/50ML
2000 SOLUTION INTRAVENOUS ONCE
Status: COMPLETED | OUTPATIENT
Start: 2022-10-19 | End: 2022-10-19

## 2022-10-19 RX ORDER — LIDOCAINE HYDROCHLORIDE 10 MG/ML
INJECTION, SOLUTION EPIDURAL; INFILTRATION; INTRACAUDAL; PERINEURAL AS NEEDED
Status: DISCONTINUED | OUTPATIENT
Start: 2022-10-19 | End: 2022-10-19

## 2022-10-19 RX ORDER — PROPOFOL 10 MG/ML
INJECTION, EMULSION INTRAVENOUS CONTINUOUS PRN
Status: DISCONTINUED | OUTPATIENT
Start: 2022-10-19 | End: 2022-10-19

## 2022-10-19 RX ORDER — PROPOFOL 10 MG/ML
INJECTION, EMULSION INTRAVENOUS AS NEEDED
Status: DISCONTINUED | OUTPATIENT
Start: 2022-10-19 | End: 2022-10-19

## 2022-10-19 RX ADMIN — SODIUM CHLORIDE, SODIUM LACTATE, POTASSIUM CHLORIDE, AND CALCIUM CHLORIDE: .6; .31; .03; .02 INJECTION, SOLUTION INTRAVENOUS at 09:30

## 2022-10-19 RX ADMIN — PROPOFOL 20 MG: 10 INJECTION, EMULSION INTRAVENOUS at 09:43

## 2022-10-19 RX ADMIN — CEFAZOLIN SODIUM 2000 MG: 2 SOLUTION INTRAVENOUS at 09:37

## 2022-10-19 RX ADMIN — PROPOFOL 30 MG: 10 INJECTION, EMULSION INTRAVENOUS at 09:50

## 2022-10-19 RX ADMIN — PROPOFOL 50 MG: 10 INJECTION, EMULSION INTRAVENOUS at 09:40

## 2022-10-19 RX ADMIN — LIDOCAINE HYDROCHLORIDE 40 MG: 10 INJECTION, SOLUTION EPIDURAL; INFILTRATION; INTRACAUDAL; PERINEURAL at 09:40

## 2022-10-19 RX ADMIN — PROPOFOL 40 MG: 10 INJECTION, EMULSION INTRAVENOUS at 09:59

## 2022-10-19 RX ADMIN — PROPOFOL 40 MCG/KG/MIN: 10 INJECTION, EMULSION INTRAVENOUS at 09:47

## 2022-10-19 RX ADMIN — FENTANYL CITRATE 25 MCG: 50 INJECTION, SOLUTION INTRAMUSCULAR; INTRAVENOUS at 09:41

## 2022-10-19 NOTE — ANESTHESIA POSTPROCEDURE EVALUATION
Post-Op Assessment Note    CV Status:  Stable  Pain Score: 0    Pain management: adequate     Mental Status:  Alert and awake   Hydration Status:  Euvolemic   PONV Controlled:  Controlled   Airway Patency:  Patent      Post Op Vitals Reviewed: Yes      Staff: CRNA         No complications documented      /64 (10/19/22 1010)    Temp 97 6 °F (36 4 °C) (10/19/22 1010)    Pulse 69 (10/19/22 1010)   Resp 18 (10/19/22 1010)    SpO2 97 % (10/19/22 1010)

## 2022-10-19 NOTE — H&P
History and Physical -  [facility]  Brigida Salinas 47 y o  female MRN: [id number]                  HPI: Brigida Salinas is a 47y o  year old female who presents for pancreatic cyst evaluation which was found on MRI, no family history of pancreatic cancer, patient had a 1 episode of abdominal pain which was resolved, history of Soria esophagus and GERD      REVIEW OF SYSTEMS: Per the HPI, and otherwise unremarkable  Historical Information   Past Medical History:   Diagnosis Date   • COVID-19 vaccine series completed     12/19/20 and 1/9/21-in EPIC   • GERD (gastroesophageal reflux disease)    • Upper respiratory tract infection 3/23/2020    She does not meet criteria for covid testing, although I feel she should not return to work at this time until her symptoms have improved       • Vitamin D deficiency      Past Surgical History:   Procedure Laterality Date   • DENTAL SURGERY  05/2017    abscess     Social History   Social History     Substance and Sexual Activity   Alcohol Use Yes   • Alcohol/week: 1 0 standard drink   • Types: 1 Glasses of wine per week    Comment: social     Social History     Substance and Sexual Activity   Drug Use Never     Social History     Tobacco Use   Smoking Status Never Smoker   Smokeless Tobacco Never Used     Family History   Problem Relation Age of Onset   • Diabetes Mother    • Hypertension Mother    • Thyroid nodules Mother    • Diabetes Father    • Hypertension Father    • Tuberculosis Father    • Cancer Father         bladder   • No Known Problems Maternal Aunt    • No Known Problems Maternal Aunt    • No Known Problems Maternal Aunt    • No Known Problems Maternal Aunt    • No Known Problems Paternal Aunt    • No Known Problems Paternal Aunt    • No Known Problems Paternal Aunt        Meds/Allergies     (Not in a hospital admission)      Allergies   Allergen Reactions   • Omnipaque [Iohexol] Hives, Vomiting and Blisters     Patient received contrast for the 1st time today 10/03/2022  Visible raised skin rash on abdomen, flank, back, and arms  Fluid filled blisters and erythema on bilateral lower legs  Also had vomiting immediately post contrast injection  She received 100mL of omnipaque during study  Pina Rojas Radiology RN        Objective     /75   Pulse 70   Temp 97 7 °F (36 5 °C) (Temporal)   Resp 12   Ht 5' 1" (1 549 m)   Wt 47 2 kg (104 lb)   LMP 05/23/2021 (Exact Date)   SpO2 98%   BMI 19 65 kg/m²       PHYSICAL EXAM    Gen: NAD  CV: RRR  CHEST: Clear  ABD: soft, NT/ND  EXT: no edema      ASSESSMENT/PLAN:  This is a 47y o  year old female here for EGD with EUS and FNA, and she is stable and optimized for her procedure

## 2022-10-19 NOTE — ANESTHESIA PREPROCEDURE EVALUATION
Procedure:  ENDOSCOPIC ULTRASOUND (UPPER)    Relevant Problems   ANESTHESIA   (+) Motion sickness      CARDIO   (+) Hypercholesterolemia      GI/HEPATIC   (+) Esophageal reflux        Physical Exam    Airway    Mallampati score: II  TM Distance: >3 FB  Neck ROM: full     Dental   No notable dental hx     Cardiovascular  Rhythm: regular, Rate: normal,     Pulmonary  Breath sounds clear to auscultation,     Other Findings  Intercisor Distance > 3cm          Anesthesia Plan  ASA Score- 2     Anesthesia Type- IV sedation with anesthesia with ASA Monitors  Additional Monitors:   Airway Plan:     Comment: NPO appropriate  Discussed benefits/risks of monitored anesthetic care which involves providing a dynamic level of mild to deep sedation  Complications include awareness and/or airway obstruction/aspiration which may necessitate conversion to general anesthesia  All questions answered  Patient understands and wishes to proceed          Plan Factors-Exercise tolerance (METS): >4 METS  Chart reviewed  EKG reviewed  Existing labs reviewed  Induction-     Postoperative Plan- Plan for postoperative opioid use  Informed Consent- Anesthetic plan and risks discussed with patient  I personally reviewed this patient with the CRNA  Discussed and agreed on the Anesthesia Plan with the CRNA  Kenneth Aleman

## 2022-10-19 NOTE — PERIOPERATIVE NURSING NOTE
[employer] Fluid drawn, packaged  [employer], [employer] label #[id number] attached to container  Fluids placed on ice prior to boxing in shipping container

## 2022-10-21 DIAGNOSIS — K21.00 GASTROESOPHAGEAL REFLUX DISEASE WITH ESOPHAGITIS WITHOUT HEMORRHAGE: Primary | ICD-10-CM

## 2022-10-21 PROCEDURE — 88112 CYTOPATH CELL ENHANCE TECH: CPT | Performed by: PATHOLOGY

## 2022-10-21 PROCEDURE — 88305 TISSUE EXAM BY PATHOLOGIST: CPT | Performed by: PATHOLOGY

## 2022-10-21 RX ORDER — PANTOPRAZOLE SODIUM 20 MG/1
20 TABLET, DELAYED RELEASE ORAL DAILY
Qty: 30 TABLET | Refills: 3 | Status: SHIPPED | OUTPATIENT
Start: 2022-10-21

## 2022-10-24 DIAGNOSIS — K86.2 PANCREATIC CYST: Primary | ICD-10-CM

## 2022-10-26 ENCOUNTER — HOSPITAL ENCOUNTER (OUTPATIENT)
Dept: RADIOLOGY | Facility: HOSPITAL | Age: 52
Discharge: HOME | End: 2022-10-26
Attending: FAMILY MEDICINE
Payer: COMMERCIAL

## 2022-10-26 DIAGNOSIS — R93.5 ABNORMAL MRI, PELVIS: ICD-10-CM

## 2022-10-26 PROCEDURE — 76830 TRANSVAGINAL US NON-OB: CPT

## 2022-10-26 PROCEDURE — 76856 US EXAM PELVIC COMPLETE: CPT

## 2022-11-02 ENCOUNTER — TELEPHONE (OUTPATIENT)
Dept: GASTROENTEROLOGY | Facility: CLINIC | Age: 52
End: 2022-11-02

## 2022-11-02 ENCOUNTER — ANNUAL EXAM (OUTPATIENT)
Dept: OBGYN CLINIC | Facility: CLINIC | Age: 52
End: 2022-11-02

## 2022-11-02 VITALS
WEIGHT: 106 LBS | HEIGHT: 61 IN | BODY MASS INDEX: 20.01 KG/M2 | SYSTOLIC BLOOD PRESSURE: 102 MMHG | DIASTOLIC BLOOD PRESSURE: 64 MMHG

## 2022-11-02 DIAGNOSIS — Z01.419 ENCOUNTER FOR ANNUAL ROUTINE GYNECOLOGICAL EXAMINATION: Primary | ICD-10-CM

## 2022-11-02 DIAGNOSIS — N83.202 CYST OF LEFT OVARY: ICD-10-CM

## 2022-11-02 DIAGNOSIS — E78.00 HYPERCHOLESTEROLEMIA: Primary | ICD-10-CM

## 2022-11-02 DIAGNOSIS — Z12.31 ENCOUNTER FOR SCREENING MAMMOGRAM FOR MALIGNANT NEOPLASM OF BREAST: ICD-10-CM

## 2022-11-02 DIAGNOSIS — R73.01 IMPAIRED FASTING GLUCOSE: ICD-10-CM

## 2022-11-02 DIAGNOSIS — Z13.0 SCREENING FOR DEFICIENCY ANEMIA: ICD-10-CM

## 2022-11-02 LAB
ALBUMIN SERPL-MCNC: 4.1 G/DL (ref 3.6–5.1)
ALBUMIN/GLOB SERPL: 1.4 (CALC) (ref 1–2.5)
ALP SERPL-CCNC: 56 U/L (ref 37–153)
ALT SERPL-CCNC: 23 U/L (ref 6–29)
AST SERPL-CCNC: 21 U/L (ref 10–35)
BILIRUB SERPL-MCNC: 0.5 MG/DL (ref 0.2–1.2)
BUN SERPL-MCNC: 14 MG/DL (ref 7–25)
BUN/CREAT SERPL: NORMAL (CALC) (ref 6–22)
CALCIUM SERPL-MCNC: 9.3 MG/DL (ref 8.6–10.4)
CHLORIDE SERPL-SCNC: 103 MMOL/L (ref 98–110)
CHOLEST SERPL-MCNC: 233 MG/DL
CHOLEST/HDLC SERPL: 2.7 (CALC)
CO2 SERPL-SCNC: 29 MMOL/L (ref 20–32)
CREAT SERPL-MCNC: 0.56 MG/DL (ref 0.5–1.03)
ERYTHROCYTE [DISTWIDTH] IN BLOOD BY AUTOMATED COUNT: 13.4 % (ref 11–15)
EST. AVERAGE GLUCOSE BLD GHB EST-MCNC: 123 MG/DL
EST. AVERAGE GLUCOSE BLD GHB EST-SCNC: 6.8 MMOL/L
GFR/BSA.PRED SERPLBLD CYS-BASED-ARV: 110 ML/MIN/1.73M2
GLOBULIN SER CALC-MCNC: 2.9 G/DL (CALC) (ref 1.9–3.7)
GLUCOSE SERPL-MCNC: 99 MG/DL (ref 65–99)
HBA1C MFR BLD: 5.9 % OF TOTAL HGB
HCT VFR BLD AUTO: 40.6 % (ref 35–45)
HDLC SERPL-MCNC: 87 MG/DL
HGB BLD-MCNC: 13.3 G/DL (ref 11.7–15.5)
LDLC SERPL CALC-MCNC: 129 MG/DL (CALC)
MCH RBC QN AUTO: 29.6 PG (ref 27–33)
MCHC RBC AUTO-ENTMCNC: 32.8 G/DL (ref 32–36)
MCV RBC AUTO: 90.2 FL (ref 80–100)
NONHDLC SERPL-MCNC: 146 MG/DL (CALC)
PLATELET # BLD AUTO: 265 THOUSAND/UL (ref 140–400)
PMV BLD REES-ECKER: 10 FL (ref 7.5–12.5)
POTASSIUM SERPL-SCNC: 4.2 MMOL/L (ref 3.5–5.3)
PROT SERPL-MCNC: 7 G/DL (ref 6.1–8.1)
RBC # BLD AUTO: 4.5 MILLION/UL (ref 3.8–5.1)
SODIUM SERPL-SCNC: 139 MMOL/L (ref 135–146)
TRIGL SERPL-MCNC: 71 MG/DL
WBC # BLD AUTO: 4 THOUSAND/UL (ref 3.8–10.8)

## 2022-11-02 NOTE — TELEPHONE ENCOUNTER
Patients GI provider:  Dr Cierra Cuevas    Number to return call: [phone]    Reason for call: Pt returning Piedad's call in regards to her results from her endoscopic ultrasound      Scheduled procedure/appointment date if applicable: 7/87/0748

## 2022-11-02 NOTE — PROGRESS NOTES
Assessment/Plan:  Pap smear done as well as annual   Encouraged self-breast examination as well as calcium supplementation  Continue annual mammogram   Reviewed colon cancer screening, up-to-date  Implications of small left ovarian cysts reviewed  Recommend repeat pelvic ultrasound 6 months  All questions answered  She will return to office in 1 year  I will notify her the above results via telephone  No problem-specific Assessment & Plan notes found for this encounter  Diagnoses and all orders for this visit:    Encounter for annual routine gynecological examination  -     Liquid-based pap, screening    Encounter for screening mammogram for malignant neoplasm of breast  -     Mammo screening bilateral w 3d & cad; Future    Cyst of left ovary  -     US pelvis complete w transvaginal; Future          Subjective:      Patient ID: Pallavi Frederick is a 47 y o  female  HPI     This is a pleasant 69-year-old female G0 who presents for gyn exam   Her last menstrual cycle was 05/2021  She denies any hot flashes or night sweats  No changes in bowel or bladder function  Patient is   She has not been sexually active in a few years  She does have a history of a multi fibroid uterus  She was experiencing some epigastric pain  She underwent workup with Gastroenterology with several incidental findings including a left ovarian cyst measuring 1 6 x 1 3 cm, stable fibroid uterus (4 cm, 1 2 cm)    Colonoscopy age 49 revealing polyp with follow-up in 5 years  She is up-to-date with her screening mammogram   She does follow up with primary care on a regular basis  The following portions of the patient's history were reviewed and updated as appropriate: allergies, current medications, past family history, past medical history, past social history, past surgical history and problem list     Review of Systems   Constitutional: Negative for fatigue, fever and unexpected weight change     Respiratory: Negative for cough, chest tightness, shortness of breath and wheezing  Cardiovascular: Negative  Negative for chest pain and palpitations  Gastrointestinal: Negative  Negative for abdominal distention, abdominal pain, blood in stool, constipation, diarrhea, nausea and vomiting  Genitourinary: Negative  Negative for difficulty urinating, dyspareunia, dysuria, flank pain, frequency, genital sores, hematuria, pelvic pain, urgency, vaginal bleeding, vaginal discharge and vaginal pain  Skin: Negative for rash  Objective:      /64   Ht 5' 1" (1 549 m)   Wt 48 1 kg (106 lb)   LMP 05/23/2021 (Exact Date)   BMI 20 03 kg/m²          Physical Exam  Constitutional:       Appearance: Normal appearance  She is well-developed  Cardiovascular:      Rate and Rhythm: Normal rate and regular rhythm  Pulmonary:      Effort: Pulmonary effort is normal       Breath sounds: Normal breath sounds  Chest:   Breasts:      Right: No inverted nipple, mass, nipple discharge, skin change or tenderness  Left: No inverted nipple, mass, nipple discharge, skin change or tenderness  Abdominal:      General: Bowel sounds are normal  There is no distension  Palpations: Abdomen is soft  Tenderness: There is no abdominal tenderness  There is no guarding or rebound  Genitourinary:     Labia:         Right: No rash, tenderness or lesion  Left: No rash, tenderness or lesion  Vagina: Normal  No signs of injury  No vaginal discharge or tenderness  Cervix: No cervical motion tenderness, discharge, friability, lesion, erythema or cervical bleeding  Uterus: Not enlarged and not tender  Adnexa:         Right: No mass, tenderness or fullness  Left: No mass, tenderness or fullness  Neurological:      Mental Status: She is alert  external genitalia is within normal limits  The vagina is evident of slight estrogen deficiency  Cervix is small nulliparous    Uterus is irregular in contour globular consistent with fibroid uterus  No pelvic floor descensus

## 2022-11-09 LAB
LAB AP GYN PRIMARY INTERPRETATION: NORMAL
Lab: NORMAL

## 2022-11-16 ENCOUNTER — TELEPHONE (OUTPATIENT)
Dept: FAMILY MEDICINE CLINIC | Facility: CLINIC | Age: 52
End: 2022-11-16

## 2022-11-18 ENCOUNTER — CLINICAL SUPPORT (OUTPATIENT)
Dept: FAMILY MEDICINE CLINIC | Facility: CLINIC | Age: 52
End: 2022-11-18

## 2022-11-18 DIAGNOSIS — Z23 NEED FOR VACCINATION: Primary | ICD-10-CM

## 2022-12-07 PROBLEM — K86.9 PANCREATIC LESION: Status: ACTIVE | Noted: 2022-12-07

## 2022-12-08 ENCOUNTER — CONSULT (OUTPATIENT)
Dept: SURGICAL ONCOLOGY | Facility: CLINIC | Age: 52
End: 2022-12-08

## 2022-12-08 VITALS
TEMPERATURE: 97.3 F | SYSTOLIC BLOOD PRESSURE: 108 MMHG | WEIGHT: 108 LBS | OXYGEN SATURATION: 98 % | HEIGHT: 61 IN | DIASTOLIC BLOOD PRESSURE: 62 MMHG | HEART RATE: 86 BPM | BODY MASS INDEX: 20.39 KG/M2

## 2022-12-08 DIAGNOSIS — K86.2 PANCREATIC CYST: ICD-10-CM

## 2022-12-08 DIAGNOSIS — K86.9 PANCREATIC LESION: Primary | ICD-10-CM

## 2022-12-08 NOTE — LETTER
December 8, 2022     Doroteo Leavitt DO  [facility] [address] [address]    Patient: Clau Law   YOB: 1970   Date of Visit: 12/8/2022       Dear Dr Cruz Nurse: Thank you for referring Clau Law to me for evaluation  Below are my notes for this consultation  If you have questions, please do not hesitate to call me  I look forward to following your patient along with you  Sincerely,        Haydee Rahman MD        CC: DO Valentin Roth MD Lovina Maki, MD  12/8/2022  8:55 AM  Incomplete               Surgical Oncology Consult       [address] [facility] [address] [address]  [address] [facility]  1970  [id number]  [address] [facility] SURGICAL ONCOLOGY [address] [address] [phone]    Diagnoses and all orders for this visit:    Pancreatic lesion    Pancreatic cyst  -     Ambulatory Referral to Surgical Oncology        Chief Complaint   Patient presents with   • Consult       No follow-ups on file  Oncology History    No history exists  History of Present Illness: 60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   She was having some mid to lower left-sided abdominal pain earlier this year  CT from October 3, 2022 reveals a 2 4 x 3 1 x 2 8 cm pancreatic tail lesion with a questionable thin septation  Follow-up MRI reveals several benign appearing lesions in the liver  There is a multiloculated septated cystic lesion in the pancreatic tail measuring 3 x 3 7 x 3 7 cm  There were no soft tissue components or abnormal enhancement  I personally reviewed the films  EUS on October 19, 2022 revealed a 3 3 x 3 cm lesion with well-defined margins in the tail of the pancreas  Biopsy was benign  CEA and the cyst fluid was 2 3ng/mL  Amylase was 94  This was statistically benign by Pancragen    No personal history of pancreatitis or family history of pancreas cancer  She does have some issues with blood sugars that are controlled by diet  She feels she is not completely compliant, but her blood sugars do not get into the 200-300 range  Review of Systems  Complete ROS Surg Onc:   Constitutional: The patient denies new or recent history of general fatigue, no recent weight loss, no change in appetite  Eyes: No complaints of visual problems, no scleral icterus  ENT: no complaints of ear pain, no hoarseness, no difficulty swallowing,  no tinnitus and no new masses in head, oral cavity, or neck  Cardiovascular: No complaints of chest pain, no palpitations, no ankle edema  Respiratory: No complaints of shortness of breath, no cough  Gastrointestinal: No complaints of jaundice, no bloody stools, no pale stools  Genitourinary: No complaints of dysuria, no hematuria, no nocturia, no frequent urination, no urethral discharge  Musculoskeletal: No complaints of weakness, paralysis, joint stiffness or arthralgias  Integumentary: No complaints of rash, no new lesions  Neurological: No complaints of convulsions, no seizures, no dizziness  Hematologic/Lymphatic: No complaints of easy bruising  Endocrine:  No hot or cold intolerance  No polydipsia, polyphagia, or polyuria  Allergy/immunology:  No environmental allergies  No food allergies  Not immunocompromised  Skin:  No pallor or rash  No wound            Patient Active Problem List   Diagnosis   • Allergic rhinitis due to pollen   • Dense breasts   • Esophageal reflux   • Impaired fasting glucose   • Vitamin D deficiency   • Positive PPD   • Fibroid, uterine   • Bulging lumbar disc   • Hypercholesterolemia   • LUQ pain   • Motion sickness   • Breast cancer screening by mammogram   • Abnormal ultrasound of abdomen   • Pancreatic lesion     Past Medical History:   Diagnosis Date   • COVID-19 vaccine series completed     12/19/20 and 1/9/21-in EPIC   • GERD (gastroesophageal reflux disease)    • Upper respiratory tract infection 3/23/2020    She does not meet criteria for covid testing, although I feel she should not return to work at this time until her symptoms have improved  • Vitamin D deficiency      Past Surgical History:   Procedure Laterality Date   • DENTAL SURGERY  05/2017    abscess     Family History   Problem Relation Age of Onset   • Diabetes Mother    • Hypertension Mother    • Thyroid nodules Mother    • Diabetes Father    • Hypertension Father    • Tuberculosis Father    • Cancer Father         bladder   • No Known Problems Maternal Aunt    • No Known Problems Maternal Aunt    • No Known Problems Maternal Aunt    • No Known Problems Maternal Aunt    • No Known Problems Paternal Aunt    • No Known Problems Paternal Aunt    • No Known Problems Paternal Aunt      Social History     Socioeconomic History   • Marital status:      Spouse name: Not on file   • Number of children: Not on file   • Years of education: Not on file   • Highest education level: Not on file   Occupational History   • Not on file   Tobacco Use   • Smoking status: Never   • Smokeless tobacco: Never   Vaping Use   • Vaping Use: Never used   Substance and Sexual Activity   • Alcohol use:  Yes     Alcohol/week: 1 0 standard drink     Types: 1 Glasses of wine per week     Comment: social   • Drug use: Never   • Sexual activity: Yes     Birth control/protection: None, Post-menopausal   Other Topics Concern   • Not on file   Social History Narrative        Per [facility]    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[employer]     Social Determinants of Health     Financial Resource Strain: Not on file   Food Insecurity: Not on file   Transportation Needs: Not on file   Physical Activity: Not on file   Stress: Not on file   Social Connections: Not on file   Intimate Partner Violence: Not on file   Housing Stability: Not on file       Current Outpatient Medications:   •  B Complex Vitamins (Vitamin B Complex) TABS, Take 1 tablet by mouth daily, Disp: 90 tablet, Rfl: 3  •  calcium gluconate 500 mg tablet, Take 1 tablet (500 mg total) by mouth daily, Disp: 90 tablet, Rfl: 3  •  Cholecalciferol (Vitamin D) 50 MCG (2000 UT) tablet, Take 1 tablet (2,000 Units total) by mouth daily, Disp: 90 tablet, Rfl: 3  •  Magnesium 200 MG TABS, Take 1 tablet (200 mg total) by mouth in the morning, Disp: 90 tablet, Rfl: 3  •  multivitamin (THERAGRAN) TABS, Take 1 tablet by mouth daily, Disp: , Rfl:   •  pantoprazole (PROTONIX) 20 mg tablet, Take 1 tablet (20 mg total) by mouth daily, Disp: 30 tablet, Rfl: 3  •  methylPREDNISolone (MEDROL) 32 MG tablet, Take 1 tablet 12hrs prior to contrast then 1 tablet 2hrs before contrast  (Patient not taking: Reported on 11/2/2022), Disp: 2 tablet, Rfl: 0  •  pantoprazole (PROTONIX) 40 mg tablet, Take 1 tablet (40 mg total) by mouth daily (Patient not taking: Reported on 11/2/2022), Disp: 30 tablet, Rfl: 6  •  scopolamine (TRANSDERM-SCOP) 1 mg/3 days TD 72 hr patch, Apply one patch to dry skin behind ear every 3 days, start 4hrs before trip, Disp: 8 patch, Rfl: 0  •  sucralfate (CARAFATE) 1 g tablet, Take 1 tablet (1 g total) by mouth 4 (four) times a day, Disp: 40 tablet, Rfl: 0  Allergies   Allergen Reactions   • Omnipaque [Iohexol] Hives, Vomiting and Blisters     Patient received contrast for the 1st time today 10/03/2022  Visible raised skin rash on abdomen, flank, back, and arms  Fluid filled blisters and erythema on bilateral lower legs  Also had vomiting immediately post contrast injection  She received 100mL of omnipaque during study  [facility]      Vitals:    12/08/22 0819   BP: 108/62   Pulse: 86   Temp: (!) 97 3 °F (36 3 °C)   SpO2: 98%       Physical Exam   Constitutional: General appearance: The Patient is well-developed and well-nourished who appears the stated age in no acute distress  Patient is pleasant and talkative  HEENT:  Normocephalic  Sclerae are anicteric  Mucous membranes are moist  Neck is supple without adenopathy  No JVD  Chest: The lungs are clear to auscultation  Cardiac: Heart is regular rate  Abdomen: Abdomen is soft, non-tender, non-distended and without masses  Extremities: There is no clubbing or cyanosis  There is no edema  Symmetric  Neuro: Grossly nonfocal  Gait is normal      Lymphatic: No evidence of cervical adenopathy bilaterally  No evidence of axillary adenopathy bilaterally  Skin: Warm, anicteric  Psych:  Patient is pleasant and talkative  Breasts:      Pathology:  [unfilled]    Labs:      Imaging  CT and MRI are as above  I personally reviewed the films  I reviewed the above laboratory and imaging data  Discussion/Summary: 81-year-old female with a 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   Outside of the septations there are no worrisome or suspicious features  The CEA level is still less than 192  The amylase is low  This may still be a mucinous cyst or a serous cystadenoma  This does not appear to be IPMN on my review  I discussed with sidebranch IPMN, of the risk of malignancy in her lifetime is 10 to 20%  Given her young age and the size of the cyst, I told her I would consider surgical resection  This would be robotic distal pancreatectomy and probable splenectomy  I explained the risks associated with the procedure as well as the hospital stay  She is already leaning towards observation, which is not completely unreasonable given it is unclear how long the cyst has been present in the fluid that was removed from the cyst by EUS was thin fluid  Her abdominal symptoms have resolved  We also discussed that it is unclear if the pancreatic lesion was contributing to her hyperglycemia  She is electing on short-term observation  Her MRI is already set up  I discussed if she has worsening hyperglycemia or abdominal discomfort to move the MRI up    She is going to follow-up with her gastroenterologist   Should she notice any changes on her symptomatology or on the future MRI she will contact us to consider surgery  All of her questions were answered

## 2022-12-08 NOTE — PROGRESS NOTES
Surgical Oncology Consult       [address] [facility]  [address] [place] [address]  [address] [facility]  1970  [id number]  [address] [facility]  [address]  [facility] [address] [phone]    Diagnoses and all orders for this visit:    Pancreatic lesion    Pancreatic cyst  -     Ambulatory Referral to Surgical Oncology        Chief Complaint   Patient presents with   • Consult       No follow-ups on file  Oncology History    No history exists  History of Present Illness: 45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   She was having some mid to lower left-sided abdominal pain earlier this year  CT from October 3, 2022 reveals a 2 4 x 3 1 x 2 8 cm pancreatic tail lesion with a questionable thin septation  Follow-up MRI reveals several benign appearing lesions in the liver  There is a multiloculated septated cystic lesion in the pancreatic tail measuring 3 x 3 7 x 3 7 cm  There were no soft tissue components or abnormal enhancement  I personally reviewed the films  EUS on October 19, 2022 revealed a 3 3 x 3 cm lesion with well-defined margins in the tail of the pancreas  Biopsy was benign  CEA and the cyst fluid was 2 3ng/mL  Amylase was 94  This was statistically benign by Pancragen  No personal history of pancreatitis or family history of pancreas cancer  She does have some issues with blood sugars that are controlled by diet  She feels she is not completely compliant, but her blood sugars do not get into the 200-300 range  Review of Systems  Complete ROS Surg Onc:   Constitutional: The patient denies new or recent history of general fatigue, no recent weight loss, no change in appetite  Eyes: No complaints of visual problems, no scleral icterus     ENT: no complaints of ear pain, no hoarseness, no difficulty swallowing,  no tinnitus and no new masses in head, oral cavity, or neck  Cardiovascular: No complaints of chest pain, no palpitations, no ankle edema  Respiratory: No complaints of shortness of breath, no cough  Gastrointestinal: No complaints of jaundice, no bloody stools, no pale stools  Genitourinary: No complaints of dysuria, no hematuria, no nocturia, no frequent urination, no urethral discharge  Musculoskeletal: No complaints of weakness, paralysis, joint stiffness or arthralgias  Integumentary: No complaints of rash, no new lesions  Neurological: No complaints of convulsions, no seizures, no dizziness  Hematologic/Lymphatic: No complaints of easy bruising  Endocrine:  No hot or cold intolerance  No polydipsia, polyphagia, or polyuria  Allergy/immunology:  No environmental allergies  No food allergies  Not immunocompromised  Skin:  No pallor or rash  No wound  Patient Active Problem List   Diagnosis   • Allergic rhinitis due to pollen   • Dense breasts   • Esophageal reflux   • Impaired fasting glucose   • Vitamin D deficiency   • Positive PPD   • Fibroid, uterine   • Bulging lumbar disc   • Hypercholesterolemia   • LUQ pain   • Motion sickness   • Breast cancer screening by mammogram   • Abnormal ultrasound of abdomen   • Pancreatic lesion     Past Medical History:   Diagnosis Date   • COVID-19 vaccine series completed     12/19/20 and 1/9/21-in EPIC   • GERD (gastroesophageal reflux disease)    • Upper respiratory tract infection 3/23/2020    She does not meet criteria for covid testing, although I feel she should not return to work at this time until her symptoms have improved       • Vitamin D deficiency      Past Surgical History:   Procedure Laterality Date   • DENTAL SURGERY  05/2017    abscess     Family History   Problem Relation Age of Onset   • Diabetes Mother    • Hypertension Mother    • Thyroid nodules Mother    • Diabetes Father    • Hypertension Father    • Tuberculosis Father • Cancer Father         bladder   • No Known Problems Maternal Aunt    • No Known Problems Maternal Aunt    • No Known Problems Maternal Aunt    • No Known Problems Maternal Aunt    • No Known Problems Paternal Aunt    • No Known Problems Paternal Aunt    • No Known Problems Paternal Aunt      Social History     Socioeconomic History   • Marital status:      Spouse name: Not on file   • Number of children: Not on file   • Years of education: Not on file   • Highest education level: Not on file   Occupational History   • Not on file   Tobacco Use   • Smoking status: Never   • Smokeless tobacco: Never   Vaping Use   • Vaping Use: Never used   Substance and Sexual Activity   • Alcohol use:  Yes     Alcohol/week: 1 0 standard drink     Types: 1 Glasses of wine per week     Comment: social   • Drug use: Never   • Sexual activity: Yes     Birth control/protection: None, Post-menopausal   Other Topics Concern   • Not on file   Social History Narrative        Per [facility]    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[employer]     Social Determinants of Health     Financial Resource Strain: Not on file   Food Insecurity: Not on file   Transportation Needs: Not on file   Physical Activity: Not on file   Stress: Not on file   Social Connections: Not on file   Intimate Partner Violence: Not on file   Housing Stability: Not on file       Current Outpatient Medications:   •  B Complex Vitamins (Vitamin B Complex) TABS, Take 1 tablet by mouth daily, Disp: 90 tablet, Rfl: 3  •  calcium gluconate 500 mg tablet, Take 1 tablet (500 mg total) by mouth daily, Disp: 90 tablet, Rfl: 3  •  Cholecalciferol (Vitamin D) 50 MCG (2000 UT) tablet, Take 1 tablet (2,000 Units total) by mouth daily, Disp: 90 tablet, Rfl: 3  •  Magnesium 200 MG TABS, Take 1 tablet (200 mg total) by mouth in the morning, Disp: 90 tablet, Rfl: 3  •  multivitamin (THERAGRAN) TABS, Take 1 tablet by mouth daily, Disp: , Rfl:   •  pantoprazole (PROTONIX) 20 mg tablet, Take 1 tablet (20 mg total) by mouth daily, Disp: 30 tablet, Rfl: 3  •  methylPREDNISolone (MEDROL) 32 MG tablet, Take 1 tablet 12hrs prior to contrast then 1 tablet 2hrs before contrast  (Patient not taking: Reported on 11/2/2022), Disp: 2 tablet, Rfl: 0  •  pantoprazole (PROTONIX) 40 mg tablet, Take 1 tablet (40 mg total) by mouth daily (Patient not taking: Reported on 11/2/2022), Disp: 30 tablet, Rfl: 6  •  scopolamine (TRANSDERM-SCOP) 1 mg/3 days TD 72 hr patch, Apply one patch to dry skin behind ear every 3 days, start 4hrs before trip, Disp: 8 patch, Rfl: 0  •  sucralfate (CARAFATE) 1 g tablet, Take 1 tablet (1 g total) by mouth 4 (four) times a day, Disp: 40 tablet, Rfl: 0  Allergies   Allergen Reactions   • Omnipaque [Iohexol] Hives, Vomiting and Blisters     Patient received contrast for the 1st time today 10/03/2022  Visible raised skin rash on abdomen, flank, back, and arms  Fluid filled blisters and erythema on bilateral lower legs  Also had vomiting immediately post contrast injection  She received 100mL of omnipaque during study  [facility] RN      Vitals:    12/08/22 0819   BP: 108/62   Pulse: 86   Temp: (!) 97 3 °F (36 3 °C)   SpO2: 98%       Physical Exam   Constitutional: General appearance: The Patient is well-developed and well-nourished who appears the stated age in no acute distress  Patient is pleasant and talkative  HEENT:  Normocephalic  Sclerae are anicteric  Mucous membranes are moist  Neck is supple without adenopathy  No JVD  Chest: The lungs are clear to auscultation  Cardiac: Heart is regular rate  Abdomen: Abdomen is soft, non-tender, non-distended and without masses  Extremities: There is no clubbing or cyanosis  There is no edema  Symmetric  Neuro: Grossly nonfocal  Gait is normal      Lymphatic: No evidence of cervical adenopathy bilaterally  No evidence of axillary adenopathy bilaterally  Skin: Warm, anicteric      Psych:  Patient is pleasant and talkative  Breasts:      Pathology:  [unfilled]    Labs:      Imaging  CT and MRI are as above  I personally reviewed the films  I reviewed the above laboratory and imaging data  Discussion/Summary: 81-year-old female with a 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   Outside of the septations there are no worrisome or suspicious features  The CEA level is still less than 192  The amylase is low  This may still be a mucinous cyst or a serous cystadenoma  This does not appear to be IPMN on my review  I discussed with sidebranch IPMN, of the risk of malignancy in her lifetime is 10 to 20%  Given her young age and the size of the cyst, I told her I would consider surgical resection  This would be robotic distal pancreatectomy and probable splenectomy  I explained the risks associated with the procedure as well as the hospital stay  She is already leaning towards observation, which is not completely unreasonable given it is unclear how long the cyst has been present in the fluid that was removed from the cyst by EUS was thin fluid  Her abdominal symptoms have resolved  We also discussed that it is unclear if the pancreatic lesion was contributing to her hyperglycemia  She is electing on short-term observation  Her MRI is already set up  I discussed if she has worsening hyperglycemia or abdominal discomfort to move the MRI up  She is going to follow-up with her gastroenterologist   Should she notice any changes on her symptomatology or on the future MRI she will contact us to consider surgery  All of her questions were answered

## 2023-01-11 ENCOUNTER — OFFICE VISIT (OUTPATIENT)
Dept: GASTROENTEROLOGY | Facility: CLINIC | Age: 53
End: 2023-01-11

## 2023-01-11 VITALS
SYSTOLIC BLOOD PRESSURE: 97 MMHG | HEIGHT: 61 IN | BODY MASS INDEX: 20.77 KG/M2 | WEIGHT: 110 LBS | DIASTOLIC BLOOD PRESSURE: 62 MMHG | HEART RATE: 64 BPM

## 2023-01-11 DIAGNOSIS — K86.2 PANCREATIC CYST: Primary | ICD-10-CM

## 2023-01-11 DIAGNOSIS — Z91.041 ALLERGY TO IMAGING CONTRAST MEDIA: ICD-10-CM

## 2023-01-11 DIAGNOSIS — K21.00 GASTROESOPHAGEAL REFLUX DISEASE WITH ESOPHAGITIS WITHOUT HEMORRHAGE: ICD-10-CM

## 2023-01-11 DIAGNOSIS — R11.0 CHRONIC NAUSEA: ICD-10-CM

## 2023-01-11 RX ORDER — ONDANSETRON 4 MG/1
4 TABLET, FILM COATED ORAL EVERY 8 HOURS PRN
Qty: 20 TABLET | Refills: 0 | Status: SHIPPED | OUTPATIENT
Start: 2023-01-11

## 2023-01-11 RX ORDER — PANTOPRAZOLE SODIUM 20 MG/1
20 TABLET, DELAYED RELEASE ORAL DAILY
Qty: 90 TABLET | Refills: 3 | Status: SHIPPED | OUTPATIENT
Start: 2023-01-11

## 2023-01-11 RX ORDER — METHYLPREDNISOLONE 32 MG/1
TABLET ORAL
Qty: 2 TABLET | Refills: 0 | Status: SHIPPED | OUTPATIENT
Start: 2023-01-11

## 2023-01-11 NOTE — PROGRESS NOTES
[facility] - Outpatient Follow-up Note  Lillian Puri 47 y o  female MRN: [id number]  Encounter: [id number]          ASSESSMENT AND PLAN:      1  Pancreatic cyst  Patient was referred to us for abnormal MRI which shows 3 7 cm size pancreatic tail cyst, endoscopic ultrasound was done which confirmed bilobed pancreatic tail cyst without any mural nodule, non dilated pancreatic duct, s/p FNA and 12 mL of brownish fluid was sent for CEA, cytology, amylase and molecular analysis which all came back normal   Cyst appears benign in nature  Will recommended repeat MRI in 6-month, patient is allergic to contrast with MRI, but will premedicate with steroid and Benadryl  No family history of pancreatic cancer, no smoking, no chronic alcohol use  Patient was seen by Dr Linden Thao who recommended continued surveillance and if cyst increase in size then will consider distal pancreatectomy  - MRI abdomen w wo contrast and mrcp; Future    2  Allergy to imaging contrast media    - methylPREDNISolone (MEDROL) 32 MG tablet; Take 1 tablet 12hrs prior to contrast then 1 tablet 2hrs before contrast   Dispense: 2 tablet; Refill: 0    3  Chronic nausea    - ondansetron (ZOFRAN) 4 mg tablet; Take 1 tablet (4 mg total) by mouth every 8 (eight) hours as needed for nausea or vomiting  Dispense: 20 tablet; Refill: 0    4  Gastroesophageal reflux disease with esophagitis without hemorrhage  S/p EGD with biopsy which confirmed evidence of 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, patient is taking low-dose PPI, refill was given, long-term PPI use and the side effect was discussed  I recommended repeat EGD for Soria's surveillance  - pantoprazole (PROTONIX) 20 mg tablet; Take 1 tablet (20 mg total) by mouth daily  Dispense: 90 tablet; Refill: 3    5    History of colon polyps which was removed, biopsy suggest tubular adenoma, repeat colonoscopy will be in 5 years    ______________________________________________________________________    SUBJECTIVE: Patient seen and examined, she come for follow-up she was referred to us for abnormal MRI which shows large pancreatic cyst in the tail of the pancreas, EUS FNA was done, pancreatic cyst fluid CEA, amylase, cytology and molecular analysis all came back normal   EGD with lower esophageal biopsy confirm evidence of Soria esophagus without dysplasia  Postprocedure patient denying any abdominal pain, no heartburn, no reflux symptoms, no nausea or vomiting, she is taking low-dose PPI, she is asking refill for medication, she denying any family history of pancreatic cancer, no history of chronic smoking, no chronic alcohol use, her father had bladder cancer but he was smoker  She has a multiple question about pancreatic cyst which are answered, will follow up with MRI, at this point is no need for surgical intervention      REVIEW OF SYSTEMS IS OTHERWISE NEGATIVE  Historical Information   Past Medical History:   Diagnosis Date   • COVID-19 vaccine series completed     12/19/20 and 1/9/21-in EPIC   • GERD (gastroesophageal reflux disease)    • Upper respiratory tract infection 3/23/2020    She does not meet criteria for covid testing, although I feel she should not return to work at this time until her symptoms have improved       • Vitamin D deficiency      Past Surgical History:   Procedure Laterality Date   • DENTAL SURGERY  05/2017    abscess     Social History   Social History     Substance and Sexual Activity   Alcohol Use Yes   • Alcohol/week: 1 0 standard drink   • Types: 1 Glasses of wine per week    Comment: social     Social History     Substance and Sexual Activity   Drug Use Never     Social History     Tobacco Use   Smoking Status Never   Smokeless Tobacco Never     Family History   Problem Relation Age of Onset   • Diabetes Mother    • Hypertension Mother    • Thyroid nodules Mother    • Diabetes Father • Hypertension Father    • Tuberculosis Father    • Cancer Father         bladder   • No Known Problems Maternal Aunt    • No Known Problems Maternal Aunt    • No Known Problems Maternal Aunt    • No Known Problems Maternal Aunt    • No Known Problems Paternal Aunt    • No Known Problems Paternal Aunt    • No Known Problems Paternal Aunt        Meds/Allergies       Current Outpatient Medications:   •  B Complex Vitamins (Vitamin B Complex) TABS  •  calcium gluconate 500 mg tablet  •  Cholecalciferol (Vitamin D) 50 MCG (2000 UT) tablet  •  Magnesium 200 MG TABS  •  methylPREDNISolone (MEDROL) 32 MG tablet  •  multivitamin (THERAGRAN) TABS  •  ondansetron (ZOFRAN) 4 mg tablet  •  pantoprazole (PROTONIX) 20 mg tablet  •  scopolamine (TRANSDERM-SCOP) 1 mg/3 days TD 72 hr patch    Allergies   Allergen Reactions   • Omnipaque [Iohexol] Hives, Vomiting and Blisters     Patient received contrast for the 1st time today 10/03/2022  Visible raised skin rash on abdomen, flank, back, and arms  Fluid filled blisters and erythema on bilateral lower legs  Also had vomiting immediately post contrast injection  She received 100mL of omnipaque during study  [facility]            Objective     Blood pressure 97/62, pulse 64, height 5' 1" (1 549 m), weight 49 9 kg (110 lb), last menstrual period 05/23/2021  Body mass index is 20 78 kg/m²  PHYSICAL EXAM:      General Appearance:   Alert, cooperative, no distress   HEENT:   Normocephalic, atraumatic, anicteric      Neck:  Supple, symmetrical, trachea midline   Lungs:   Clear to auscultation bilaterally; no rales, rhonchi or wheezing; respirations unlabored    Heart[de-identified]   Regular rate and rhythm; no murmur, rub, or gallop     Abdomen:   Soft, non-tender, non-distended; normal bowel sounds; no masses, no organomegaly    Genitalia:   Deferred    Rectal:   Deferred    Extremities:  No cyanosis, clubbing or edema    Pulses:  2+ and symmetric    Skin:  No jaundice, rashes, or lesions    Lymph nodes:  No palpable cervical lymphadenopathy        Lab Results:   No visits with results within 1 Day(s) from this visit  Latest known visit with results is:   Orders Only on 11/18/2022   Component Date Value   • H  Pylori Ag, EIA, Stool 11/18/2022           Radiology Results:   No results found

## 2023-01-20 ENCOUNTER — TELEPHONE (OUTPATIENT)
Dept: OBGYN CLINIC | Facility: CLINIC | Age: 53
End: 2023-01-20

## 2023-01-20 NOTE — TELEPHONE ENCOUNTER
Patient called in requesting an appointment as soon as possible due to abdominal bleeding  Please contact patient to schedule appointment

## 2023-01-23 ENCOUNTER — TELEPHONE (OUTPATIENT)
Dept: OBGYN CLINIC | Facility: CLINIC | Age: 53
End: 2023-01-23

## 2023-01-23 DIAGNOSIS — N95.0 PMB (POSTMENOPAUSAL BLEEDING): Primary | ICD-10-CM

## 2023-01-23 NOTE — TELEPHONE ENCOUNTER
Pt informed to schedule pelvic U/S then f/u appt for EBX - pre-instr given/KA    Rad order for same placed in pt's chart ([facility])

## 2023-01-23 NOTE — TELEPHONE ENCOUNTER
Pt here for yearly 11/2022 - LMP approx 1 1/2 yrs ago - started with PMB (br red, flow) x past 4 days with cramping  Pt prev had pelvic U/S 10/26/2022 f/u abd MRI - (L) ovarian cyst   Do you recom pelvic U/S, then EMB?

## 2023-01-24 ENCOUNTER — OFFICE VISIT (OUTPATIENT)
Dept: ENDOCRINOLOGY | Facility: CLINIC | Age: 53
End: 2023-01-24

## 2023-01-24 VITALS
HEART RATE: 64 BPM | DIASTOLIC BLOOD PRESSURE: 74 MMHG | SYSTOLIC BLOOD PRESSURE: 110 MMHG | HEIGHT: 61 IN | WEIGHT: 109.8 LBS | BODY MASS INDEX: 20.73 KG/M2

## 2023-01-24 DIAGNOSIS — R73.03 PREDIABETES: ICD-10-CM

## 2023-01-24 DIAGNOSIS — Z83.3 FAMILY HISTORY OF DIABETES MELLITUS (DM): ICD-10-CM

## 2023-01-24 DIAGNOSIS — R73.01 IMPAIRED FASTING GLUCOSE: Primary | ICD-10-CM

## 2023-01-24 NOTE — ASSESSMENT & PLAN NOTE
- Patient has prediabetic A1c for the past 15 years, her A1c peaked recently at 6 3  However it does seem to be a very slow stable and A1c progression   -Patient denies symptoms polydipsia polyuria polyphagia  -Strong family history of diabetes on both father and mother side, but her parents require insulin for treatment  Patient 21 brother also diagnosed with diabetes and currently on insulin treatment as well  -Parent diagnosed in the 48 brother diagnosed in his 36  -Patient has 2 fasting glucose last year but has been less than 100  -Patient does not check fingerstick  -Patient recently has a pancreatic cyst discovered on ultrasound for symptoms of epigastric pain, underwent aspiration, cytology negative for cancers  Follow-up with GI for repeat MRI  -Her brother also have rheumatoid arthritis  Plan  -Patient is at risk for type 2 diabetes for her high risk ethnicity as [address], and strong family history of diabetes  -We will order some initial lab work including antibody testing per patient request, though low suspicion for type 1 diabetes     -Recommend patient add carb in moderation to balance her currently keto centric diet  -We discussed with  to put patient on a trial of Dexcom  -Follow-up in 6 weeks

## 2023-01-24 NOTE — PROGRESS NOTES
New Patient Progress Note      No chief complaint on file  Referring Provider  Taj Okeefe Do  [address] [facility]   [address] Karen Rich     History of Present Illness:   47years old woman, with family history diabetes, pancreatic cyst, Soria's esophagus; presented as consult for prediabetes  Patient reported that she has been prediabetic for the past 15 years where her A1c was gone between 5 7-6 3, progression was slow  She reported that her family has strong history of diabetes, but her mom and dad were diagnosed in their 46s and treated with insulin, her brother also diagnosed with diabetes type 2 and his 36 and currently is on insulin as well  Patient has several denies hypoglycemic symptoms, maintaining good BMI with keto diet and exercise  Patient Active Problem List   Diagnosis   • Allergic rhinitis due to pollen   • Dense breasts   • Esophageal reflux   • Impaired fasting glucose   • Vitamin D deficiency   • Positive PPD   • Fibroid, uterine   • Bulging lumbar disc   • Hypercholesterolemia   • LUQ pain   • Motion sickness   • Breast cancer screening by mammogram   • Abnormal ultrasound of abdomen   • Pancreatic lesion   • Family history of diabetes mellitus (DM)   • Prediabetes      Past Medical History:   Diagnosis Date   • COVID-19 vaccine series completed     12/19/20 and 1/9/21-in EPIC   • GERD (gastroesophageal reflux disease)    • Upper respiratory tract infection 3/23/2020    She does not meet criteria for covid testing, although I feel she should not return to work at this time until her symptoms have improved       • Vitamin D deficiency       Past Surgical History:   Procedure Laterality Date   • DENTAL SURGERY  05/2017    abscess      Family History   Problem Relation Age of Onset   • Diabetes Mother    • Hypertension Mother    • Thyroid nodules Mother    • Diabetes Father    • Hypertension Father    • Tuberculosis Father    • Cancer Father         bladder   • No Known Problems Maternal Aunt    • No Known Problems Maternal Aunt    • No Known Problems Maternal Aunt    • No Known Problems Maternal Aunt    • No Known Problems Paternal Aunt    • No Known Problems Paternal Aunt    • No Known Problems Paternal Aunt      Social History     Tobacco Use   • Smoking status: Never   • Smokeless tobacco: Never   Substance Use Topics   • Alcohol use: Yes     Alcohol/week: 1 0 standard drink     Types: 1 Glasses of wine per week     Comment: social     Allergies   Allergen Reactions   • Omnipaque [Iohexol] Hives, Vomiting and Blisters     Patient received contrast for the 1st time today 10/03/2022  Visible raised skin rash on abdomen, flank, back, and arms  Fluid filled blisters and erythema on bilateral lower legs  Also had vomiting immediately post contrast injection  She received 100mL of omnipaque during study  [facility]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      [unfilled]    Review of Systems   Constitutional: Negative for chills and fever  HENT: Negative for ear pain and sore throat  Eyes: Negative for pain and visual disturbance  Respiratory: Negative for cough and shortness of breath  Cardiovascular: Negative for chest pain and palpitations  Gastrointestinal: Negative for abdominal pain and vomiting  Endocrine: Negative for polydipsia, polyphagia and polyuria  Genitourinary: Negative for dysuria and hematuria  Musculoskeletal: Negative for arthralgias and back pain  Skin: Negative for color change and rash  Neurological: Negative for seizures and syncope  All other systems reviewed and are negative  Physical Exam:  Body mass index is 20 75 kg/m²  /74   Pulse 64   Ht 5' 1" (1 549 m)   Wt 49 8 kg (109 lb 12 8 oz)   LMP 05/23/2021 (Exact Date)   BMI 20 75 kg/m²    [unfilled]     Physical Exam  Constitutional:       Appearance: Normal appearance  HENT:      Head: Normocephalic and atraumatic  Nose: No congestion        Mouth/Throat:      Pharynx: No oropharyngeal exudate or posterior oropharyngeal erythema  Eyes:      General: No scleral icterus  Cardiovascular:      Rate and Rhythm: Normal rate and regular rhythm  Heart sounds: No murmur heard  No gallop  Pulmonary:      Effort: Pulmonary effort is normal  No respiratory distress  Breath sounds: No wheezing  Abdominal:      General: Abdomen is flat  There is no distension  Tenderness: There is no abdominal tenderness  Genitourinary:     Rectum: Guaiac result negative  Musculoskeletal:         General: No swelling  Skin:     General: Skin is dry  Capillary Refill: Capillary refill takes less than 2 seconds  Coloration: Skin is not jaundiced  Neurological:      General: No focal deficit present  Mental Status: She is alert and oriented to person, place, and time  Cranial Nerves: No cranial nerve deficit  Psychiatric:         Mood and Affect: Mood normal          Behavior: Behavior normal        Diabetic Foot Exam    Labs:   No components found for: HA1C  No components found for: GLU    Lab Results   Component Value Date    CREATININE 0 56 11/02/2022    CREATININE 0 67 05/14/2022    CREATININE 0 67 01/08/2022    BUN 14 11/02/2022     05/13/2017    K 4 2 11/02/2022     11/02/2022    CO2 29 11/02/2022     eGFR   Date Value Ref Range Status   11/02/2022 110 > OR = 60 mL/min/1 73m2 Final     Comment:     The eGFR is based on the CKD-EPI 2021 equation  To calculate   the new eGFR from a previous Creatinine or Cystatin C  result, go to Lyn[place] at  [email or web address]       No components found for: [facility]    Lab Results   Component Value Date    CHOL 170 05/13/2017    HDL 87 11/02/2022    TRIG 71 11/02/2022       Lab Results   Component Value Date    ALT 23 11/02/2022    AST 21 11/02/2022    ALKPHOS 56 11/02/2022    BILITOT 0 5 05/13/2017       Lab Results   Component Value Date    TSH 1 720 01/08/2022    FREET4 1 47 06/27/2020 Impression:  1  Impaired fasting glucose    2  Prediabetes    3  Family history of diabetes mellitus (DM)         Plan:     Problem List Items Addressed This Visit        Endocrine    Impaired fasting glucose - Primary    Relevant Orders    Basic metabolic panel Lab Collect    Insulin, fasting- Lab Collect    Glutamic acid decarboxylase Lab Collect    IA2 Autoantibodies Lab Collect    Zinc Transporter 8 (Znt8) Antibody       Other    Family history of diabetes mellitus (DM)    Prediabetes     - Patient has prediabetic A1c for the past 15 years, her A1c peaked recently at 6 3  However it does seem to be a very slow stable and A1c progression   -Patient denies symptoms polydipsia polyuria polyphagia  -Strong family history of diabetes on both father and mother side, but her parents require insulin for treatment  Patient 21 brother also diagnosed with diabetes and currently on insulin treatment as well  -Parent diagnosed in the 48 brother diagnosed in his 36  -Patient has 2 fasting glucose last year but has been less than 100  -Patient does not check fingerstick  -Patient recently has a pancreatic cyst discovered on ultrasound for symptoms of epigastric pain, underwent aspiration, cytology negative for cancers  Follow-up with GI for repeat MRI  -Her brother also have rheumatoid arthritis  Plan  -Patient is at risk for type 2 diabetes for her high risk ethnicity as [address], and strong family history of diabetes  -We will order some initial lab work including antibody testing per patient request, though low suspicion for type 1 diabetes     -Recommend patient add carb in moderation to balance her currently keto centric diet  -We discussed with  to put patient on a trial of Dexcom  -Follow-up in 6 weeks         Relevant Orders    Basic metabolic panel Lab Collect    Insulin, fasting- Lab Collect    Glutamic acid decarboxylase Lab Collect    IA2 Autoantibodies Lab Collect    Zinc Transporter 8 (Znt8) Antibody           Discussed with the patient and all questioned fully answered  She will call me if any problems arise      Counseled patient on diagnostic results, prognosis, risk and benefit of treatment options, instruction for management, importance of treatment compliance, Risk  factor reduction and impressions      Osman Preston, DO   [id number]

## 2023-01-27 ENCOUNTER — HOSPITAL ENCOUNTER (OUTPATIENT)
Dept: RADIOLOGY | Facility: HOSPITAL | Age: 53
Discharge: HOME | End: 2023-01-27

## 2023-01-27 DIAGNOSIS — N95.0 PMB (POSTMENOPAUSAL BLEEDING): ICD-10-CM

## 2023-02-06 ENCOUNTER — PROCEDURE VISIT (OUTPATIENT)
Dept: OBGYN CLINIC | Facility: CLINIC | Age: 53
End: 2023-02-06

## 2023-02-06 ENCOUNTER — OFFICE VISIT (OUTPATIENT)
Dept: DIABETES SERVICES | Facility: CLINIC | Age: 53
End: 2023-02-06

## 2023-02-06 VITALS
DIASTOLIC BLOOD PRESSURE: 72 MMHG | HEIGHT: 61 IN | SYSTOLIC BLOOD PRESSURE: 110 MMHG | WEIGHT: 111 LBS | BODY MASS INDEX: 20.96 KG/M2

## 2023-02-06 DIAGNOSIS — D21.9 FIBROIDS: ICD-10-CM

## 2023-02-06 DIAGNOSIS — N95.0 POSTMENOPAUSAL BLEEDING: Primary | ICD-10-CM

## 2023-02-06 DIAGNOSIS — R73.01 IMPAIRED FASTING GLUCOSE: Primary | ICD-10-CM

## 2023-02-06 DIAGNOSIS — R73.03 PREDIABETES: ICD-10-CM

## 2023-02-06 DIAGNOSIS — N83.201 CYST OF RIGHT OVARY: ICD-10-CM

## 2023-02-06 NOTE — PROGRESS NOTES
Dexcom Professional Training    Met with Juan Wills for [employer]  Training today included:    - Explained procedure to Margaret Feldman  - Checked sensor expiration date; Sensor lot number: [id number], Transmitter Id: [id number]  - This is a blinded study and was set up on patient phone  - Sisto Pleas has been cleaned with alcohol and dried  - Discussed site selection; identified insertion site: right lower abdomen  - Cleansed the skin with alcohol  - Inserted sensor per instructions: smooth tape on the skin, insert sensor, withdraw needle, remove insertion tool, put an overlay patch on  - Snapped in grey transmitter  - Verified transmitter fully snapped in on both sides (2clicks)  - Removed transmitter latch  - Disposed insertion tool in sharps container  - Started Sensor and verified communication with phone    Patient instructions reviewed with patient:    - Fill out food log sheets, one for each day  - Sensor is waterproof for showering and swimming, remove for hot tub, MRI  - Remove if redness, bleeding, swelling, discomfort at site  - Removal is in 1 week with return instructions  Sensor inserted by: Nathaniel Hameed will return in one week for sensor removal and data download        Kourtney Sawant  [address] [place] [address] Drive [id number]

## 2023-02-06 NOTE — PROGRESS NOTES
Assessment/Plan:  Endometrial biopsy done today  Patient will call for results in 2 weeks  Pelvic rest until then  Implications of postmenopausal bleeding reviewed  Provided above normal she will monitor for any further bleeding over the next 6 months  She will be scheduled for her annual visit 11/2023  No problem-specific Assessment & Plan notes found for this encounter  Diagnoses and all orders for this visit:    Postmenopausal bleeding    Cyst of right ovary    Fibroids          Subjective:      Patient ID: Yogesh De La Fuente is a 47 y o  female  HPI   This is a very pleasant 56-year-old female [de-identified] presents complaining of postmenopausal bleeding  She went through menopause at age 53  She has never been on hormone replacement therapy  She then noticed 10 days of red bleeding sometimes passing clots  She denies any hot flashes or night sweats  No changes in bowel or bladder function  vb x 10 da    US fibroids 3 5, 1 7, 1cm,  Stripe 4mm, lt ov cyst 9mm    The following portions of the patient's history were reviewed and updated as appropriate: allergies, current medications, past family history, past medical history, past social history, past surgical history and problem list     Review of Systems   Constitutional: Negative for fatigue, fever and unexpected weight change  Respiratory: Negative for cough, chest tightness, shortness of breath and wheezing  Cardiovascular: Negative  Negative for chest pain and palpitations  Gastrointestinal: Negative  Negative for abdominal distention, abdominal pain, blood in stool, constipation, diarrhea, nausea and vomiting  Genitourinary: Positive for vaginal bleeding  Negative for difficulty urinating, dyspareunia, dysuria, flank pain, frequency, genital sores, hematuria, pelvic pain, urgency, vaginal discharge and vaginal pain  Skin: Negative for rash           Objective:      /72   Ht 5' 1" (1 549 m)   Wt 50 3 kg (111 lb)   LMP 05/23/2021 (Exact Date)   BMI 20 97 kg/m²          Physical Exam  Genitourinary:     Labia:         Right: No rash, tenderness or lesion  Left: No rash, tenderness or lesion  Vagina: No signs of injury  No vaginal discharge or tenderness  Cervix: No cervical motion tenderness, discharge, friability, lesion, erythema or cervical bleeding  Uterus: Not enlarged and not tender  Adnexa:         Right: No mass, tenderness or fullness  Left: No mass, tenderness or fullness  Endometrial biopsy    Date/Time: 2/6/2023 1:56 PM  Performed by: Beck Emmanuel DO  Authorized by: Beck Emmanuel DO   Universal Protocol:  Consent: Verbal consent obtained  Consent given by: patient  Patient understanding: patient states understanding of the procedure being performed  Patient consent: the patient's understanding of the procedure matches consent given  Patient identity confirmed: verbally with patient      Indication:     Indications: Post-menopausal bleeding      Chronicity of post-menopausal bleeding:  New  Pre-procedure:     Premeds:  Ibuprofen  Procedure:     Procedure: endometrial biopsy with Pipelle      Cervix cleaned and prepped: yes      The cervix was dilated: yes      Uterus sounded: yes      Uterus sound depth (cm):  6 5    Specimen collected: specimen collected and sent to pathology      Patient tolerated procedure well with no complications: yes    Findings:     Uterus size:  6-8 weeks    Cervix: normal      Adnexa: normal    Comments:     Procedure comments:  Cervix was visualized cleansed with Betadine solution  The anterior lip of the cervix was grasped using an Allis clamp  Cervical os was then dilated  Endometrial Pipelle was then inserted  Uterus sounded to 6 5 cm  2 passes were obtained with scant amount of tissue  All instruments removed from the vagina  Patient tolerated the procedure well

## 2023-02-10 ENCOUNTER — TELEPHONE (OUTPATIENT)
Dept: OBGYN CLINIC | Facility: CLINIC | Age: 53
End: 2023-02-10

## 2023-02-10 NOTE — TELEPHONE ENCOUNTER
----- Message from John Fernandes DO sent at 2/9/2023  6:29 PM EST -----  Please call inform EBx c/w perimenopause   Monitor for any further bleed keep appt annual as scheduled

## 2023-02-20 ENCOUNTER — TELEPHONE (OUTPATIENT)
Dept: DIABETES SERVICES | Facility: CLINIC | Age: 53
End: 2023-02-20

## 2023-03-04 LAB
BUN SERPL-MCNC: 14 MG/DL (ref 7–25)
BUN/CREAT SERPL: NORMAL (CALC) (ref 6–22)
CALCIUM SERPL-MCNC: 9.5 MG/DL (ref 8.6–10.4)
CHLORIDE SERPL-SCNC: 105 MMOL/L (ref 98–110)
CO2 SERPL-SCNC: 30 MMOL/L (ref 20–32)
CREAT SERPL-MCNC: 0.63 MG/DL (ref 0.5–1.03)
GAD65 AB SER IA-ACNC: <5 IU/ML
GFR/BSA.PRED SERPLBLD CYS-BASED-ARV: 107 ML/MIN/1.73M2
GLUCOSE SERPL-MCNC: 97 MG/DL (ref 65–99)
INSULIN SERPL-ACNC: 5.7 UIU/ML
ISLET CELL512 AB SER IA-ACNC: <5.4 U/ML
POTASSIUM SERPL-SCNC: 4.3 MMOL/L (ref 3.5–5.3)
SODIUM SERPL-SCNC: 141 MMOL/L (ref 135–146)
ZNT8 AB SERPL IA-ACNC: <10 U/ML

## 2023-03-29 ENCOUNTER — OFFICE VISIT (OUTPATIENT)
Dept: ENDOCRINOLOGY | Facility: CLINIC | Age: 53
End: 2023-03-29

## 2023-03-29 VITALS
WEIGHT: 107.2 LBS | HEIGHT: 61 IN | HEART RATE: 71 BPM | SYSTOLIC BLOOD PRESSURE: 100 MMHG | BODY MASS INDEX: 20.24 KG/M2 | DIASTOLIC BLOOD PRESSURE: 64 MMHG

## 2023-03-29 DIAGNOSIS — K86.2 PANCREATIC CYST: ICD-10-CM

## 2023-03-29 DIAGNOSIS — R73.03 PREDIABETES: Primary | ICD-10-CM

## 2023-03-29 DIAGNOSIS — E55.9 VITAMIN D DEFICIENCY: ICD-10-CM

## 2023-03-29 DIAGNOSIS — R73.01 IMPAIRED FASTING GLUCOSE: ICD-10-CM

## 2023-03-29 NOTE — PROGRESS NOTES
[place] 47 y o  female MRN: [id number]    Encounter: [id number]      Assessment/Plan     Problem List Items Addressed This Visit        Digestive    Pancreatic cyst       Endocrine    Impaired fasting glucose       Other    Prediabetes - Primary     Continue focus on dietary modifications-consider repeat diagnostic CGM trial next year         Relevant Orders    Comprehensive metabolic panel Lab Collect    HEMOGLOBIN A1C W/ EAG ESTIMATION Lab Collect    Vitamin D deficiency     Continue supplementations         Relevant Orders    Vitamin D 25 hydroxy Lab Collect     CC: pre Diabetes    History of Present Illness     HPI:  71-year-old female with prediabetes, pancreatic cyst, family history of diabetes seen in follow-up  She generally feels well and has no acute complaints  Since her last visit she had a diagnostic trial of CGM and learned about different foods in her diet causing postmeal hyperglycemia including dried fruits, alcohol etc   She denies any symptoms of polyuria, polydipsia, blurry vision  No recent changes in her weight     Both her parents as well as her brother are diabetic  She does not recall any of them developing diabetes at a young age, mother and brother are on insulin therapy-mother was on oral hypoglycemics for 5 years before starting insulin therapy  Brother was diagnosed in his late 45s and started insulin  Review of Systems    Historical Information   Past Medical History:   Diagnosis Date   • COVID-19 vaccine series completed     12/19/20 and 1/9/21-in EPIC   • GERD (gastroesophageal reflux disease)    • Upper respiratory tract infection 3/23/2020    She does not meet criteria for covid testing, although I feel she should not return to work at this time until her symptoms have improved       • Vitamin D deficiency      Past Surgical History:   Procedure Laterality Date   • DENTAL SURGERY  05/2017    abscess     Social History   Social History     Substance and Sexual Activity Alcohol Use Yes   • Alcohol/week: 1 0 standard drink   • Types: 1 Glasses of wine per week    Comment: social     Social History     Substance and Sexual Activity   Drug Use Never     Social History     Tobacco Use   Smoking Status Never   Smokeless Tobacco Never     Family History:   Family History   Problem Relation Age of Onset   • Diabetes Mother    • Hypertension Mother    • Thyroid nodules Mother    • Diabetes Father    • Hypertension Father    • Tuberculosis Father    • Cancer Father         bladder   • No Known Problems Maternal Aunt    • No Known Problems Maternal Aunt    • No Known Problems Maternal Aunt    • No Known Problems Maternal Aunt    • No Known Problems Paternal Aunt    • No Known Problems Paternal Aunt    • No Known Problems Paternal Aunt        Meds/Allergies   Current Outpatient Medications   Medication Sig Dispense Refill   • pantoprazole (PROTONIX) 20 mg tablet Take 1 tablet (20 mg total) by mouth daily 90 tablet 3   • B Complex Vitamins (Vitamin B Complex) TABS Take 1 tablet by mouth daily (Patient not taking: Reported on 3/29/2023) 90 tablet 3   • calcium gluconate 500 mg tablet Take 1 tablet (500 mg total) by mouth daily (Patient not taking: Reported on 3/29/2023) 90 tablet 3   • Cholecalciferol (Vitamin D) 50 MCG (2000 UT) tablet Take 1 tablet (2,000 Units total) by mouth daily (Patient not taking: Reported on 3/29/2023) 90 tablet 3   • Magnesium 200 MG TABS Take 1 tablet (200 mg total) by mouth in the morning (Patient not taking: Reported on 3/29/2023) 90 tablet 3   • methylPREDNISolone (MEDROL) 32 MG tablet Take 1 tablet 12hrs prior to contrast then 1 tablet 2hrs before contrast  (Patient not taking: Reported on 3/29/2023) 2 tablet 0   • multivitamin (THERAGRAN) TABS Take 1 tablet by mouth daily (Patient not taking: Reported on 3/29/2023)     • ondansetron (ZOFRAN) 4 mg tablet Take 1 tablet (4 mg total) by mouth every 8 (eight) hours as needed for nausea or vomiting (Patient not "taking: Reported on 3/29/2023) 20 tablet 0   • scopolamine (TRANSDERM-SCOP) 1 mg/3 days TD 72 hr patch Apply one patch to dry skin behind ear every 3 days, start 4hrs before trip (Patient not taking: Reported on 1/11/2023) 8 patch 0     No current facility-administered medications for this visit  Allergies   Allergen Reactions   • Omnipaque [Iohexol] Hives, Vomiting and Blisters     Patient received contrast for the 1st time today 10/03/2022  Visible raised skin rash on abdomen, flank, back, and arms  Fluid filled blisters and erythema on bilateral lower legs  Also had vomiting immediately post contrast injection  She received 100mL of omnipaque during study  [facility] RN        Objective   Vitals: Blood pressure 100/64, pulse 71, height 5' 1\" (1 549 m), weight 48 6 kg (107 lb 3 2 oz), last menstrual period 05/23/2021  Physical Exam  Vitals reviewed  Constitutional:       General: She is not in acute distress  Appearance: Normal appearance  She is not ill-appearing, toxic-appearing or diaphoretic  HENT:      Head: Normocephalic and atraumatic  Eyes:      General: No scleral icterus  Extraocular Movements: Extraocular movements intact  Cardiovascular:      Rate and Rhythm: Normal rate and regular rhythm  Heart sounds: Normal heart sounds  No murmur heard  Pulmonary:      Effort: Pulmonary effort is normal  No respiratory distress  Breath sounds: Normal breath sounds  No wheezing or rales  Abdominal:      General: There is no distension  Palpations: Abdomen is soft  Tenderness: There is no abdominal tenderness  There is no guarding  Musculoskeletal:      Cervical back: Neck supple  Right lower leg: No edema  Left lower leg: No edema  Lymphadenopathy:      Cervical: No cervical adenopathy  Skin:     General: Skin is warm and dry  Neurological:      General: No focal deficit present        Mental Status: She is alert and oriented to person, place, " "and time  Psychiatric:         Mood and Affect: Mood normal          Behavior: Behavior normal          Thought Content: Thought content normal          Judgment: Judgment normal          The history was obtained from the review of the chart, patient  Lab Results:   Lab Results   Component Value Date/Time    Hemoglobin A1C 5 9 (H) 11/02/2022 08:47 AM    Hemoglobin A1C 6 3 (H) 05/14/2022 07:20 AM    White Blood Cell Count 4 0 11/02/2022 08:47 AM    Hemoglobin 13 3 11/02/2022 08:47 AM    HCT 40 6 11/02/2022 08:47 AM    MCV 90 2 11/02/2022 08:47 AM    Platelet Count 237 45/51/5426 08:47 AM    BUN 14 02/25/2023 07:51 AM    BUN 14 11/02/2022 08:47 AM    BUN 12 05/14/2022 07:20 AM    Potassium 4 3 02/25/2023 07:51 AM    Potassium 4 2 11/02/2022 08:47 AM    Potassium 4 0 05/14/2022 07:20 AM    Chloride 105 02/25/2023 07:51 AM    Chloride 103 11/02/2022 08:47 AM    Chloride 101 05/14/2022 07:20 AM    CO2 30 02/25/2023 07:51 AM    CO2 29 11/02/2022 08:47 AM    CO2 22 05/14/2022 07:20 AM    Creatinine 0 63 02/25/2023 07:51 AM    Creatinine 0 56 11/02/2022 08:47 AM    Creatinine 0 67 05/14/2022 07:20 AM    AST 21 11/02/2022 08:47 AM    AST 25 05/14/2022 07:20 AM    ALT 23 11/02/2022 08:47 AM    ALT 14 05/14/2022 07:20 AM    Albumin 4 1 11/02/2022 08:47 AM    Albumin 4 7 05/14/2022 07:20 AM    Globulin 2 9 11/02/2022 08:47 AM    Globulin, Total 2 7 05/14/2022 07:20 AM    HDL 87 11/02/2022 08:47 AM    HDL 92 05/14/2022 07:20 AM    Triglycerides 71 11/02/2022 08:47 AM    Triglycerides 53 05/14/2022 07:20 AM           Portions of the record may have been created with voice recognition software  Occasional wrong word or \"sound a like\" substitutions may have occurred due to the inherent limitations of voice recognition software  Read the chart carefully and recognize, using context, where substitutions have occurred    "

## 2023-06-26 NOTE — TELEPHONE ENCOUNTER
I returned patients call and left message to c/b   Thank you What Type Of Note Output Would You Prefer (Optional)?: Bullet Format Hpi Title: Evaluation of Skin Lesions How Severe Are Your Spot(S)?: mild Have Your Spot(S) Been Treated In The Past?: has not been treated

## 2023-08-14 ENCOUNTER — OFFICE VISIT (OUTPATIENT)
Dept: FAMILY MEDICINE CLINIC | Facility: CLINIC | Age: 53
End: 2023-08-14
Payer: COMMERCIAL

## 2023-08-14 VITALS
TEMPERATURE: 97 F | HEART RATE: 68 BPM | WEIGHT: 105.4 LBS | DIASTOLIC BLOOD PRESSURE: 65 MMHG | RESPIRATION RATE: 18 BRPM | SYSTOLIC BLOOD PRESSURE: 102 MMHG | BODY MASS INDEX: 19.9 KG/M2 | HEIGHT: 61 IN

## 2023-08-14 DIAGNOSIS — Z13.29 SCREENING FOR THYROID DISORDER: ICD-10-CM

## 2023-08-14 DIAGNOSIS — E78.00 HYPERCHOLESTEROLEMIA: ICD-10-CM

## 2023-08-14 DIAGNOSIS — Z00.00 WELL ADULT EXAM: Primary | ICD-10-CM

## 2023-08-14 DIAGNOSIS — Z78.0 POST-MENOPAUSAL: ICD-10-CM

## 2023-08-14 DIAGNOSIS — R73.03 PREDIABETES: ICD-10-CM

## 2023-08-14 DIAGNOSIS — Z13.0 SCREENING FOR DEFICIENCY ANEMIA: ICD-10-CM

## 2023-08-14 DIAGNOSIS — R25.2 LEG CRAMP: ICD-10-CM

## 2023-08-14 DIAGNOSIS — E55.9 VITAMIN D DEFICIENCY: ICD-10-CM

## 2023-08-14 PROCEDURE — 99396 PREV VISIT EST AGE 40-64: CPT | Performed by: FAMILY MEDICINE

## 2023-08-14 NOTE — PROGRESS NOTES
[facility] HEALTH MAINTENANCE OFFICE VISIT  [facility] - [address] [place] [address]    NAME: Pilo Arango  AGE: 47 y.o. SEX: female  : 1970     DATE: 2023    Assessment and Plan     1. Well adult exam    2. Hypercholesterolemia  -     Comprehensive metabolic panel; Future  -     Lipid Panel with Direct LDL reflex; Future    3. Screening for deficiency anemia  -     CBC and differential; Future    4. Screening for thyroid disorder  -     TSH, 3rd generation with Free T4 reflex; Future  -     T4, free; Future    5. Vitamin D deficiency  -     Vitamin D 25 hydroxy; Future    6. Prediabetes  -     HEMOGLOBIN A1C W/ EAG ESTIMATION; Future    7. Post-menopausal  -     DXA bone density spine hip and pelvis; Future; Expected date: 2023    8. Leg cramp  -     Comprehensive metabolic panel; Future  -     Magnesium; Future        Patient Counseling:   Nutrition: Stressed importance of a well balanced diet, moderation of sodium/saturated fat, caloric balance and sufficient intake of fiber  Exercise: Stressed the importance of regular exercise with a goal of 150 minutes per week  Dental Health: Discussed daily flossing and brushing and regular dental visits   Immunizations reviewed: Up To Date  Discussed benefits of:  Screening labs. BMI Counseling: Body mass index is 19.92 kg/m². Discussed with patient's BMI with her. The BMI is normal.     No follow-ups on file.         Chief Complaint     Chief Complaint   Patient presents with   • Physical Exam     Shell Fail, CMA    • leg cramping     Right leg cramping  On and off issue- cramps are usually very painful and last a few minutes   • Fatigue       History of Present Illness     HPI    Well Adult Physical   Patient here for a comprehensive physical exam.      Diet and Physical Activity  Diet: well balanced diet  Exercise: daily      Depression Screen  PHQ-2/9 Depression Screening    Little interest or pleasure in doing things: 0 - not at all  Feeling down, depressed, or hopeless: 0 - not at all  PHQ-2 Score: 0  PHQ-2 Interpretation: Negative depression screen          General Health  Hearing: Normal:  bilateral  Vision: goes for regular eye exams and wears glasses  Dental: regular dental visits, brushes teeth twice daily and flosses teeth occasionally    Reproductive Health  No issues  and Follows with gynecologist      The following portions of the patient's history were reviewed and updated as appropriate: allergies, current medications, past family history, past medical history, past social history, past surgical history and problem list.    Review of Systems     Review of Systems   Constitutional: Negative. HENT: Negative. Eyes: Negative. Respiratory: Negative. Cardiovascular: Negative. Gastrointestinal: Negative. Endocrine: Negative. Genitourinary: Negative. Musculoskeletal: Negative. Skin: Negative. Allergic/Immunologic: Negative. Neurological: Negative. Hematological: Negative. Psychiatric/Behavioral: Negative. Past Medical History     Past Medical History:   Diagnosis Date   • COVID-19 vaccine series completed     12/19/20 and 1/9/21-in EPIC   • GERD (gastroesophageal reflux disease)    • Upper respiratory tract infection 3/23/2020    She does not meet criteria for covid testing, although I feel she should not return to work at this time until her symptoms have improved.      • Vitamin D deficiency        Past Surgical History     Past Surgical History:   Procedure Laterality Date   • DENTAL SURGERY  05/2017    abscess       Social History     Social History     Socioeconomic History   • Marital status:      Spouse name: None   • Number of children: None   • Years of education: None   • Highest education level: None   Occupational History   • None   Tobacco Use   • Smoking status: Never   • Smokeless tobacco: Never   Vaping Use   • Vaping Use: Never used   Substance and Sexual Activity   • Alcohol use:  Yes     Alcohol/week: 1.0 standard drink of alcohol     Types: 1 Glasses of wine per week     Comment: social   • Drug use: Never   • Sexual activity: Not Currently     Birth control/protection: None, Post-menopausal   Other Topics Concern   • None   Social History Narrative        Per [employer]    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[employer]     Social Determinants of Health     Financial Resource Strain: Not on file   Food Insecurity: Not on file   Transportation Needs: Not on file   Physical Activity: Not on file   Stress: Not on file   Social Connections: Not on file   Intimate Partner Violence: Not on file   Housing Stability: Not on file       Family History     Family History   Problem Relation Age of Onset   • Diabetes Mother    • Hypertension Mother    • Thyroid nodules Mother    • Diabetes Father    • Hypertension Father    • Tuberculosis Father    • Cancer Father         bladder   • No Known Problems Maternal Aunt    • No Known Problems Maternal Aunt    • No Known Problems Maternal Aunt    • No Known Problems Maternal Aunt    • No Known Problems Paternal Aunt    • No Known Problems Paternal Aunt    • No Known Problems Paternal Aunt        Current Medications       Current Outpatient Medications:   •  multivitamin (THERAGRAN) TABS, Take 1 tablet by mouth daily, Disp: , Rfl:   •  pantoprazole (PROTONIX) 20 mg tablet, Take 1 tablet (20 mg total) by mouth daily, Disp: 90 tablet, Rfl: 3  •  B Complex Vitamins (Vitamin B Complex) TABS, Take 1 tablet by mouth daily (Patient not taking: Reported on 3/29/2023), Disp: 90 tablet, Rfl: 3  •  calcium gluconate 500 mg tablet, Take 1 tablet (500 mg total) by mouth daily (Patient not taking: Reported on 3/29/2023), Disp: 90 tablet, Rfl: 3  •  Cholecalciferol (Vitamin D) 50 MCG (2000 UT) tablet, Take 1 tablet (2,000 Units total) by mouth daily (Patient not taking: Reported on 3/29/2023), Disp: 90 tablet, Rfl: 3  •  Magnesium 200 MG TABS, Take 1 tablet (200 mg total) by mouth in the morning (Patient not taking: Reported on 3/29/2023), Disp: 90 tablet, Rfl: 3     Allergies     Allergies   Allergen Reactions   • Omnipaque [Iohexol] Hives, Vomiting and Blisters     Patient received contrast for the 1st time today 10/03/2022. Visible raised skin rash on abdomen, flank, back, and arms. Fluid filled blisters and erythema on bilateral lower legs. Also had vomiting immediately post contrast injection. She received 100mL of omnipaque during study. [facility] Radiology RN        Objective     /65   Pulse 68   Temp (!) 97 °F (36.1 °C)   Resp 18   Ht 5' 1" (1.549 m)   Wt 47.8 kg (105 lb 6.4 oz)   LMP 05/23/2021 (Exact Date)   BMI 19.92 kg/m²      Physical Exam  Constitutional:       General: She is not in acute distress. Appearance: Normal appearance. She is well-developed. She is not diaphoretic. HENT:      Head: Normocephalic and atraumatic. Right Ear: Tympanic membrane, ear canal and external ear normal. There is impacted cerumen. Left Ear: Tympanic membrane, ear canal and external ear normal. There is impacted cerumen. Eyes:      General: No scleral icterus. Right eye: No discharge. Left eye: No discharge. Extraocular Movements: Extraocular movements intact. Conjunctiva/sclera: Conjunctivae normal.      Pupils: Pupils are equal, round, and reactive to light. Cardiovascular:      Rate and Rhythm: Normal rate and regular rhythm. Heart sounds: Normal heart sounds. No murmur heard. No friction rub. No gallop. Pulmonary:      Effort: Pulmonary effort is normal. No respiratory distress. Breath sounds: Normal breath sounds. No wheezing or rales. Chest:      Chest wall: No tenderness. Abdominal:      General: Bowel sounds are normal. There is no distension. Palpations: Abdomen is soft. There is no mass. Tenderness: There is no abdominal tenderness. There is no guarding or rebound. Musculoskeletal:         General: No deformity. Normal range of motion. Cervical back: Normal range of motion and neck supple. Skin:     General: Skin is warm and dry. Findings: No erythema or rash. Neurological:      Mental Status: She is alert and oriented to person, place, and time. Psychiatric:         Behavior: Behavior normal.         Thought Content:  Thought content normal.         Judgment: Judgment normal.           Vision Screening    Right eye Left eye Both eyes   Without correction      With correction 20/50 20/20 20/20           MD CRISTI Brooks [facility]

## 2023-08-20 LAB
25(OH)D3 SERPL-MCNC: 30 NG/ML (ref 30–100)
ALBUMIN SERPL-MCNC: 4.3 G/DL (ref 3.6–5.1)
ALBUMIN/GLOB SERPL: 1.6 (CALC) (ref 1–2.5)
ALP SERPL-CCNC: 61 U/L (ref 37–153)
ALT SERPL-CCNC: 15 U/L (ref 6–29)
AST SERPL-CCNC: 18 U/L (ref 10–35)
BASOPHILS # BLD AUTO: 42 CELLS/UL (ref 0–200)
BASOPHILS NFR BLD AUTO: 1.1 %
BILIRUB SERPL-MCNC: 0.5 MG/DL (ref 0.2–1.2)
BUN SERPL-MCNC: 15 MG/DL (ref 7–25)
BUN/CREAT SERPL: ABNORMAL (CALC) (ref 6–22)
CALCIUM SERPL-MCNC: 9.5 MG/DL (ref 8.6–10.4)
CHLORIDE SERPL-SCNC: 105 MMOL/L (ref 98–110)
CHOLEST SERPL-MCNC: 215 MG/DL
CHOLEST/HDLC SERPL: 2.4 (CALC)
CO2 SERPL-SCNC: 30 MMOL/L (ref 20–32)
CREAT SERPL-MCNC: 0.63 MG/DL (ref 0.5–1.03)
EOSINOPHIL # BLD AUTO: 91 CELLS/UL (ref 15–500)
EOSINOPHIL NFR BLD AUTO: 2.4 %
ERYTHROCYTE [DISTWIDTH] IN BLOOD BY AUTOMATED COUNT: 12.6 % (ref 11–15)
EST. AVERAGE GLUCOSE BLD GHB EST-MCNC: 128 MG/DL
EST. AVERAGE GLUCOSE BLD GHB EST-SCNC: 7.1 MMOL/L
GFR/BSA.PRED SERPLBLD CYS-BASED-ARV: 107 ML/MIN/1.73M2
GLOBULIN SER CALC-MCNC: 2.7 G/DL (CALC) (ref 1.9–3.7)
GLUCOSE SERPL-MCNC: 108 MG/DL (ref 65–99)
HBA1C MFR BLD: 6.1 % OF TOTAL HGB
HCT VFR BLD AUTO: 40.5 % (ref 35–45)
HDLC SERPL-MCNC: 89 MG/DL
HGB BLD-MCNC: 13.2 G/DL (ref 11.7–15.5)
LDLC SERPL CALC-MCNC: 111 MG/DL (CALC)
LYMPHOCYTES # BLD AUTO: 1881 CELLS/UL (ref 850–3900)
LYMPHOCYTES NFR BLD AUTO: 49.5 %
MAGNESIUM SERPL-MCNC: 2.2 MG/DL (ref 1.5–2.5)
MCH RBC QN AUTO: 29.7 PG (ref 27–33)
MCHC RBC AUTO-ENTMCNC: 32.6 G/DL (ref 32–36)
MCV RBC AUTO: 91.2 FL (ref 80–100)
MONOCYTES # BLD AUTO: 224 CELLS/UL (ref 200–950)
MONOCYTES NFR BLD AUTO: 5.9 %
NEUTROPHILS # BLD AUTO: 1562 CELLS/UL (ref 1500–7800)
NEUTROPHILS NFR BLD AUTO: 41.1 %
NONHDLC SERPL-MCNC: 126 MG/DL (CALC)
PLATELET # BLD AUTO: 239 THOUSAND/UL (ref 140–400)
PMV BLD REES-ECKER: 10.4 FL (ref 7.5–12.5)
POTASSIUM SERPL-SCNC: 3.9 MMOL/L (ref 3.5–5.3)
PROT SERPL-MCNC: 7 G/DL (ref 6.1–8.1)
RBC # BLD AUTO: 4.44 MILLION/UL (ref 3.8–5.1)
SODIUM SERPL-SCNC: 142 MMOL/L (ref 135–146)
T4 FREE SERPL-MCNC: 1.3 NG/DL (ref 0.8–1.8)
TRIGL SERPL-MCNC: 65 MG/DL
TSH SERPL-ACNC: 2.18 MIU/L
WBC # BLD AUTO: 3.8 THOUSAND/UL (ref 3.8–10.8)

## 2023-09-19 ENCOUNTER — TELEPHONE (OUTPATIENT)
Dept: NEUROLOGY | Facility: CLINIC | Age: 53
End: 2023-09-19

## 2023-10-25 ENCOUNTER — TELEPHONE (OUTPATIENT)
Age: 53
End: 2023-10-25

## 2023-10-25 DIAGNOSIS — Z12.31 ENCOUNTER FOR SCREENING MAMMOGRAM FOR BREAST CANCER: Primary | ICD-10-CM

## 2023-10-25 NOTE — TELEPHONE ENCOUNTER
Pt called requesting a mammogram to be ordered by her provider.   If she can be contacted once its ordered so she can schedule

## 2023-11-02 ENCOUNTER — ANNUAL EXAM (OUTPATIENT)
Dept: OBGYN CLINIC | Facility: CLINIC | Age: 53
End: 2023-11-02
Payer: COMMERCIAL

## 2023-11-02 VITALS
DIASTOLIC BLOOD PRESSURE: 66 MMHG | SYSTOLIC BLOOD PRESSURE: 102 MMHG | WEIGHT: 109.6 LBS | HEIGHT: 61 IN | BODY MASS INDEX: 20.69 KG/M2

## 2023-11-02 DIAGNOSIS — Z12.31 ENCOUNTER FOR SCREENING MAMMOGRAM FOR MALIGNANT NEOPLASM OF BREAST: ICD-10-CM

## 2023-11-02 DIAGNOSIS — R92.30 DENSE BREAST TISSUE: ICD-10-CM

## 2023-11-02 DIAGNOSIS — D21.9 FIBROIDS: ICD-10-CM

## 2023-11-02 DIAGNOSIS — N60.12 FIBROCYSTIC BREAST CHANGES OF BOTH BREASTS: ICD-10-CM

## 2023-11-02 DIAGNOSIS — N60.11 FIBROCYSTIC BREAST CHANGES OF BOTH BREASTS: ICD-10-CM

## 2023-11-02 DIAGNOSIS — Z01.419 ENCOUNTER FOR ANNUAL ROUTINE GYNECOLOGICAL EXAMINATION: Primary | ICD-10-CM

## 2023-11-02 DIAGNOSIS — N83.202 CYST OF LEFT OVARY: ICD-10-CM

## 2023-11-02 PROCEDURE — S0612 ANNUAL GYNECOLOGICAL EXAMINA: HCPCS | Performed by: OBSTETRICS & GYNECOLOGY

## 2023-11-02 NOTE — PROGRESS NOTES
Assessment/Plan:  Pap smear deferred due to low risk status. Encourage self breast examination as well as calcium supplementation. Continue annual mammogram, ordered by PCP. She expresses concerns regarding dense fibrocystic breasts. Discussed automated breast ultrasound, implications reviewed. She will check to see if this is covered by her insurance. Will obtain pelvic ultrasound 1/2024, follow-up small ovarian cyst.  Reviewed signs and symptoms of menopause. All questions answered. Return to office in 1 year or as needed. I will notify her of the above results via telephone. No problem-specific Assessment & Plan notes found for this encounter. Diagnoses and all orders for this visit:    Encounter for annual routine gynecological examination    Encounter for screening mammogram for malignant neoplasm of breast    Cyst of left ovary  -     US pelvis complete w transvaginal; Future    Fibroids  -     US pelvis complete w transvaginal; Future    Fibrocystic breast changes of both breasts  -     US breast screening bilateral complete (ABUS); Future    Dense breast tissue  -     US breast screening bilateral complete (ABUS); Future          Subjective:      Patient ID: Baljit Marquez is a 47 y.o. female. HPI    This is a pleasant 16-year-old female G0 presents for her GYN exam.  Last menstrual cycle was 5/2021 followed by episode of spotting 2/2023. She underwent endometrial biopsy revealing weakly proliferative endometrium. There is been no further bleeding or spotting since then. She denies any hot flashes or night sweats. No changes in bowel or bladder function. Patient is . She has not been sexually active in a few years. She does have a history of multifibroid uterus, asymptomatic. She does have a small left ovarian cysts 1.6 cm. Colonoscopy age 49 revealing polyp with follow-up in 5 years.   She is scheduled for DEXA scan and has an order for screening mammogram.  She expresses concerns regarding fibrocystic breasts. She does follow-up with her family physician on a regular basis. The following portions of the patient's history were reviewed and updated as appropriate: allergies, current medications, past family history, past medical history, past social history, past surgical history, and problem list.    Review of Systems   Constitutional:  Negative for fatigue, fever and unexpected weight change. Respiratory:  Negative for cough, chest tightness, shortness of breath and wheezing. Cardiovascular: Negative. Negative for chest pain and palpitations. Gastrointestinal: Negative. Negative for abdominal distention, abdominal pain, blood in stool, constipation, diarrhea, nausea and vomiting. Genitourinary: Negative. Negative for difficulty urinating, dyspareunia, dysuria, flank pain, frequency, genital sores, hematuria, pelvic pain, urgency, vaginal bleeding, vaginal discharge and vaginal pain. Skin:  Negative for rash. Objective:      /66   Ht 5' 1" (1.549 m)   Wt 49.7 kg (109 lb 9.6 oz)   LMP 05/23/2021 (Exact Date)   BMI 20.71 kg/m²          Physical Exam  Constitutional:       Appearance: Normal appearance. She is well-developed. HENT:      Head: Normocephalic and atraumatic. Cardiovascular:      Rate and Rhythm: Normal rate and regular rhythm. Pulmonary:      Effort: Pulmonary effort is normal.      Breath sounds: Normal breath sounds. Chest:   Breasts:     Right: No inverted nipple, mass, nipple discharge, skin change or tenderness. Left: No inverted nipple, mass, nipple discharge, skin change or tenderness. Abdominal:      General: Bowel sounds are normal. There is no distension. Palpations: Abdomen is soft. Tenderness: There is no abdominal tenderness. There is no guarding or rebound. Genitourinary:     Labia:         Right: No rash, tenderness or lesion. Left: No rash, tenderness or lesion.        Vagina: Normal. No signs of injury. No vaginal discharge or tenderness. Cervix: No cervical motion tenderness, discharge, friability, lesion or cervical bleeding. Uterus: Not enlarged and not tender. Adnexa:         Right: No mass, tenderness or fullness. Left: No mass, tenderness or fullness. Neurological:      Mental Status: She is alert.    Psychiatric:         Behavior: Behavior normal.

## 2023-12-05 ENCOUNTER — HOSPITAL ENCOUNTER (OUTPATIENT)
Dept: RADIOLOGY | Facility: HOSPITAL | Age: 53
Discharge: HOME | End: 2023-12-05
Payer: COMMERCIAL

## 2023-12-05 DIAGNOSIS — D21.9 FIBROIDS: ICD-10-CM

## 2023-12-05 DIAGNOSIS — N83.202 CYST OF LEFT OVARY: ICD-10-CM

## 2023-12-05 PROCEDURE — 76856 US EXAM PELVIC COMPLETE: CPT

## 2023-12-05 PROCEDURE — 76830 TRANSVAGINAL US NON-OB: CPT

## 2023-12-13 ENCOUNTER — OFFICE VISIT (OUTPATIENT)
Dept: NEUROLOGY | Facility: CLINIC | Age: 53
End: 2023-12-13
Payer: COMMERCIAL

## 2023-12-13 VITALS
BODY MASS INDEX: 20.39 KG/M2 | WEIGHT: 108 LBS | HEIGHT: 61 IN | HEART RATE: 63 BPM | SYSTOLIC BLOOD PRESSURE: 108 MMHG | DIASTOLIC BLOOD PRESSURE: 72 MMHG

## 2023-12-13 DIAGNOSIS — G24.5 BLEPHAROSPASM: Primary | ICD-10-CM

## 2023-12-13 PROCEDURE — 99205 OFFICE O/P NEW HI 60 MIN: CPT | Performed by: STUDENT IN AN ORGANIZED HEALTH CARE EDUCATION/TRAINING PROGRAM

## 2023-12-13 NOTE — PROGRESS NOTES
[facility]  PATIENT:  Deena Ortez  MRN:  [id number]  :  1970  DATE OF SERVICE:  2023  REFERRED BY: Self, Referral  PMD: Priscilla Sweet DO    Assessment/Plan:     Deena Ortez is a very pleasant 47 y.o. female with a past medical history who presents for evaluation of eyelid twitching. R blepharospasm  -will obtain MRI brain w/wo to r/o structural causes for her symptoms  -discussed treatment with botox injections. She is not interested in treatment at this time but would like to discuss further after her MRI results. Would recommend f/u with her ophthalmologist if she is interested in botox. If she is not interested in botox, could consider treatment with klonopin or baclofen in the future    CC:   Eyelid twitching    History of Present Illness:     47 y.o. female with a past medical history who presents for evaluation of eyelid twitching. She states that her symptoms have been ongoing for the past 10 years. She states that her symptoms fluctuate and are not always constant. Currently she has only mild twitching of the R eyelids, but sometimes will have larger spasm of the R eye with complete forced eye closure. She denies involvement of  Will sometimes be triggered when she moves her mouth. She has previously been evaluated by ophthalmology, who offered botox for her symptoms in the past. However, she deferred treatment at that time. She has tried acupuncture in the past, which has been transiently helpful for shorter periods of time before recurrence of symptoms. She is currently not interested in botox treatment. She has previously tried completely cutting out caffeine, which made no change in her symptoms. No change with magnesium supplementation, no changes with different levels of stress/anxiety.      Past Medical History:     Past Medical History:   Diagnosis Date    COVID-19 vaccine series completed     20 and 21-in EPIC    GERD (gastroesophageal reflux disease) Upper respiratory tract infection 3/23/2020    She does not meet criteria for covid testing, although I feel she should not return to work at this time until her symptoms have improved. Vitamin D deficiency        Patient Active Problem List   Diagnosis    Allergic rhinitis due to pollen    Dense breasts    Esophageal reflux    Impaired fasting glucose    Vitamin D deficiency    Positive PPD    Fibroid, uterine    Bulging lumbar disc    Hypercholesterolemia    LUQ pain    Motion sickness    Breast cancer screening by mammogram    Abnormal ultrasound of abdomen    Pancreatic lesion    Family history of diabetes mellitus (DM)    Prediabetes    Pancreatic cyst       Medications:      Current Outpatient Medications   Medication Sig Dispense Refill    calcium gluconate 500 mg tablet Take 1 tablet (500 mg total) by mouth daily 90 tablet 3    multivitamin (THERAGRAN) TABS Take 1 tablet by mouth daily      pantoprazole (PROTONIX) 20 mg tablet Take 1 tablet (20 mg total) by mouth daily 90 tablet 3    B Complex Vitamins (Vitamin B Complex) TABS Take 1 tablet by mouth daily (Patient not taking: Reported on 3/29/2023) 90 tablet 3    Cholecalciferol (Vitamin D) 50 MCG (2000 UT) tablet Take 1 tablet (2,000 Units total) by mouth daily (Patient not taking: Reported on 3/29/2023) 90 tablet 3    Magnesium 200 MG TABS Take 1 tablet (200 mg total) by mouth in the morning (Patient not taking: Reported on 3/29/2023) 90 tablet 3     No current facility-administered medications for this visit. Allergies: Allergies   Allergen Reactions    Omnipaque [Iohexol] Hives, Vomiting and Blisters     Patient received contrast for the 1st time today 10/03/2022. Visible raised skin rash on abdomen, flank, back, and arms. Fluid filled blisters and erythema on bilateral lower legs. Also had vomiting immediately post contrast injection. She received 100mL of omnipaque during study.  Kristi Staff Radiology RN        Family History:     Family History   Problem Relation Age of Onset    Diabetes Mother     Hypertension Mother     Thyroid nodules Mother     Diabetes Father     Hypertension Father     Tuberculosis Father     Cancer Father         bladder    No Known Problems Maternal Aunt     No Known Problems Maternal Aunt     No Known Problems Maternal Aunt     No Known Problems Maternal Aunt     No Known Problems Paternal Aunt     No Known Problems Paternal Aunt     No Known Problems Paternal Aunt        Social History:       Social History     Socioeconomic History    Marital status:      Spouse name: Not on file    Number of children: Not on file    Years of education: Not on file    Highest education level: Not on file   Occupational History    Not on file   Tobacco Use    Smoking status: Never    Smokeless tobacco: Never   Vaping Use    Vaping status: Never Used   Substance and Sexual Activity    Alcohol use: Yes     Alcohol/week: 1.0 standard drink of alcohol     Types: 1 Glasses of wine per week     Comment: social    Drug use: Never    Sexual activity: Not Currently     Birth control/protection: None, Post-menopausal   Other Topics Concern    Not on file   Social History Narrative        Per [employer]    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[employer]     Social Determinants of Health     Financial Resource Strain: Not on file   Food Insecurity: Not on file   Transportation Needs: Not on file   Physical Activity: Not on file   Stress: Not on file   Social Connections: Not on file   Intimate Partner Violence: Not on file   Housing Stability: Not on file         Objective:   /72 (BP Location: Right arm, Patient Position: Sitting, Cuff Size: Adult)   Pulse 63   Ht 5' 1" (1.549 m)   Wt 49 kg (108 lb)   LMP 05/23/2021 (Exact Date)   BMI 20.41 kg/m²     General: Patient is not in any acute/apparent distress, well nourished, well developed and cooperative.    HEENT: normocephalic, atraumatic, moist membranes  Neck: supple  Extremities: no edema noted   Skin: no lesions or rash  Musculosketal: no bony abnormalities    Neurologic Examination:   Mental status: alert, awake, oriented X 3 and following commands. Speech/Language: Speech is fluent without any dysarthria, no aphasia noted, comprehension intact    Cranial Nerves:   CN I: smell not tested  CN II: Visual fields full to confrontation  CN III, IV, VI: Extraocular movements intact bilaterally. Pupils equal round and reactive to light bilaterally. CN V: Facial sensation is normal.  CN VII: Full and symmetric facial movement. Mild, fine eyelid myoclonus noted on exam  CN VIII: Hearing is normal.  CN IX, X: Palate elevates symmetrically. CN XI: Shoulder shrug strength is normal.  CN XII: Tongue midline without atrophy or fasciculations. Motor:   Strength 5/5 in all 4 extremities. Bulk/tone - normal.  Fasiculations - none    Sensory:   Sensation intact to soft touch in all 4 extremities. Cerebellar:   Finger-to-nose intact    Reflexes: 2+ in all 4 extremities  Pathologic reflexes - babinski reflex negative    Gait:   Normal gait, Romberg sign negative     Imaging:        Review of Systems:     ROS:    Review of Systems   Constitutional:  Negative for appetite change, fatigue and fever. HENT: Negative. Negative for hearing loss, tinnitus, trouble swallowing and voice change. Eyes: Negative. Negative for photophobia, pain and visual disturbance. Respiratory: Negative. Negative for shortness of breath. Cardiovascular: Negative. Negative for palpitations. Gastrointestinal: Negative. Negative for nausea and vomiting. Endocrine: Negative. Negative for cold intolerance. Genitourinary: Negative. Negative for dysuria, frequency and urgency. Musculoskeletal:  Negative for back pain, gait problem, myalgias and neck pain. Skin: Negative. Negative for rash. Allergic/Immunologic: Negative.     Neurological:  Negative for dizziness, tremors, seizures, syncope, facial asymmetry, speech difficulty, weakness, light-headedness, numbness and headaches. Hematological: Negative. Does not bruise/bleed easily. Psychiatric/Behavioral: Negative. Negative for confusion, hallucinations and sleep disturbance. I have spent a total time of 54 minutes on 12/13/23 in caring for this patient including Risks and benefits of tx options, Instructions for management, Patient and family education, Impressions, Documenting in the medical record, Reviewing / ordering tests, medicine, procedures  , and Obtaining or reviewing history  .

## 2023-12-13 NOTE — PATIENT INSTRUCTIONS
Patient Instructions:  -please schedule your MRI brain with and without contrast  -follow up in about 6 months

## 2023-12-22 ENCOUNTER — TELEPHONE (OUTPATIENT)
Dept: OBGYN CLINIC | Facility: CLINIC | Age: 53
End: 2023-12-22

## 2023-12-22 NOTE — TELEPHONE ENCOUNTER
----- Message from Kate Cameron DO sent at 12/21/2023  7:45 AM EST -----  Please call the patient inform pelvic US reveals stable findings fibroid/ovarian cyst. Resume annual gyn exams

## 2023-12-27 ENCOUNTER — HOSPITAL ENCOUNTER (OUTPATIENT)
Dept: RADIOLOGY | Facility: HOSPITAL | Age: 53
Discharge: HOME | End: 2023-12-27
Attending: FAMILY MEDICINE
Payer: COMMERCIAL

## 2023-12-27 VITALS — BODY MASS INDEX: 20.39 KG/M2 | HEIGHT: 61 IN | WEIGHT: 108 LBS

## 2023-12-27 DIAGNOSIS — Z78.0 POST-MENOPAUSAL: ICD-10-CM

## 2023-12-27 PROCEDURE — 77080 DXA BONE DENSITY AXIAL: CPT

## 2024-01-12 ENCOUNTER — HOSPITAL ENCOUNTER (OUTPATIENT)
Dept: RADIOLOGY | Facility: HOSPITAL | Age: 54
Discharge: HOME | End: 2024-01-12
Payer: COMMERCIAL

## 2024-01-12 DIAGNOSIS — G24.5 BLEPHAROSPASM: ICD-10-CM

## 2024-01-12 PROCEDURE — G1004 CDSM NDSC: HCPCS

## 2024-01-12 PROCEDURE — 70553 MRI BRAIN STEM W/O & W/DYE: CPT

## 2024-01-12 PROCEDURE — A9585 GADOBUTROL INJECTION: HCPCS | Performed by: STUDENT IN AN ORGANIZED HEALTH CARE EDUCATION/TRAINING PROGRAM

## 2024-01-12 RX ORDER — GADOBUTROL 604.72 MG/ML
5 INJECTION INTRAVENOUS
Status: COMPLETED | OUTPATIENT
Start: 2024-01-12 | End: 2024-01-12

## 2024-01-12 RX ADMIN — GADOBUTROL 5 ML: 604.72 INJECTION INTRAVENOUS at 17:52

## 2024-01-16 ENCOUNTER — TELEPHONE (OUTPATIENT)
Age: 54
End: 2024-01-16

## 2024-01-16 ENCOUNTER — VBI (OUTPATIENT)
Dept: ADMINISTRATIVE | Facility: OTHER | Age: 54
End: 2024-01-16

## 2024-01-16 DIAGNOSIS — R73.03 PREDIABETES: Primary | ICD-10-CM

## 2024-01-16 DIAGNOSIS — E78.00 HYPERCHOLESTEROLEMIA: ICD-10-CM

## 2024-01-16 DIAGNOSIS — Z79.899 ENCOUNTER FOR LONG-TERM CURRENT USE OF MEDICATION: ICD-10-CM

## 2024-01-16 NOTE — TELEPHONE ENCOUNTER
Upon review of the In Basket request we were able to locate, review, and update the patient chart as requested for Mammogram.    Any additional questions or concerns should be emailed to the Practice Liaisons via the appropriate education email address, please do not reply via In Basket.    Thank you  GONZALO LAMBERT

## 2024-01-16 NOTE — TELEPHONE ENCOUNTER
New order written - generic order done, I wasn't sure what lab she is going to   In chart  Thank you,  Desi Hurt, DO

## 2024-01-16 NOTE — TELEPHONE ENCOUNTER
Patient called stating she would like Dr Hurt to please place a new lab order for patient to get her A1C checked again. Once completed, please send lab order home to patient.

## 2024-01-18 ENCOUNTER — OFFICE VISIT (OUTPATIENT)
Dept: CARDIOLOGY CLINIC | Facility: CLINIC | Age: 54
End: 2024-01-18
Payer: COMMERCIAL

## 2024-01-18 VITALS
BODY MASS INDEX: 19.83 KG/M2 | HEIGHT: 61 IN | HEART RATE: 59 BPM | DIASTOLIC BLOOD PRESSURE: 60 MMHG | WEIGHT: 105 LBS | OXYGEN SATURATION: 97 % | SYSTOLIC BLOOD PRESSURE: 96 MMHG

## 2024-01-18 DIAGNOSIS — R55 SYNCOPE, UNSPECIFIED SYNCOPE TYPE: ICD-10-CM

## 2024-01-18 DIAGNOSIS — Z82.49 FAMILY HISTORY OF HEART DISEASE IN BROTHER: ICD-10-CM

## 2024-01-18 DIAGNOSIS — R73.03 PREDIABETES: ICD-10-CM

## 2024-01-18 DIAGNOSIS — E78.5 DYSLIPIDEMIA: ICD-10-CM

## 2024-01-18 PROCEDURE — 93000 ELECTROCARDIOGRAM COMPLETE: CPT | Performed by: INTERNAL MEDICINE

## 2024-01-18 PROCEDURE — 99203 OFFICE O/P NEW LOW 30 MIN: CPT | Performed by: INTERNAL MEDICINE

## 2024-01-18 NOTE — PROGRESS NOTES
Consultation - Cardiology Office  [facility].    Stefany Medina 53 y.o. female MRN: [id number]  : 1970  Unit/Bed#: ? Encounter: [id number]      Assessment:     1. Syncope, unspecified syncope type    2. Dyslipidemia    3. Prediabetes    4. Family history of heart disease in brother        Discussion summary and Plan:    1.  Syncope most likely vasovagal made worse by her chronically low blood pressure and possibly dehydration    2.  Dyslipidemia with good HDL    3.  Family history of heart disease with brother having open heart surgery    4.  Prediabetes mellitus with HbA1c 6.1    Plan.    Lifestyle modification strongly recommended.  She is encouraged to have liberal salt intake and did drink fluids.  Will get exercise stress test to rule out an ischemia.  Echo Doppler and coronary calcium score.  Will be scheduled for extended monitoring.      Patient / Caretaker was advised and educated to call our office  immediately if  patient has any new symptoms of chest pain/shortness of breath, near-syncope, syncope, light headedness sustained palpitations or any other cardiovascular symptoms before their scheduled follow-up appointment.  Office number was provided #[phone].      Thank you for your consultation.  If you have any question please call me at [phone]    Counseling :  A description of the counseling.  Goals and Barriers.  Patient's ability to self care: Yes  Medication side effect reviewed with patient in detail and all their questions answered to their satisfaction.      Primary Care Physician Requesting Consult: Desi Hurt DO    Reason for Consult / Principal Problem: Syncope        HPI :     Stefany Medina is a 53 y.o. year old female who was referred by primary care doctor for episodes of syncope.  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  She felt darkness coming in front of right she has to lie down to feel better.  She does run low blood pressure.  On arrival her blood pressure was around 90/70.  Later on checked by me was 108/60 and after she quickly stood up blood pressure dropped to 96.  She was not dizzy or lightheaded at that time.  She does not smoke.  She drinks socially.  She has no other surgery.  She is postmenopausal.  She works as a and a status [employer] and [employer].  No other cardiovascular issues at this time.    During her syncopal episode she has no palpitations.  She does not feel any fluttering or any chest pain.  She try to exercise 4 times a week and her weight is 105 pounds.    Review of Systems   Constitutional:  Negative for activity change, chills, diaphoresis, fever and unexpected weight change.   HENT:  Negative for congestion.    Eyes:  Negative for discharge and redness.   Respiratory:  Negative for cough, chest tightness, shortness of breath and wheezing.    Cardiovascular: Negative.  Negative for chest pain, palpitations and leg swelling.   Gastrointestinal:  Negative for abdominal pain, diarrhea and nausea.   Endocrine: Negative.    Genitourinary:  Negative for decreased urine volume and urgency.   Musculoskeletal: Negative.  Negative for arthralgias, back pain and gait problem.   Skin:  Negative for rash and wound.   Allergic/Immunologic: Negative.    Neurological:  Positive for syncope. Negative for dizziness, seizures, weakness, light-headedness and headaches.   Hematological: Negative.    Psychiatric/Behavioral:  Negative for agitation and confusion. The patient is not nervous/anxious.        Historical Information   Past Medical History:   Diagnosis Date   • COVID-19 vaccine series completed     12/19/20 and 1/9/21-in EPIC   • GERD (gastroesophageal reflux disease)    • Upper  respiratory tract infection 3/23/2020    She does not meet criteria for covid testing, although I feel she should not return to work at this time until her symptoms have improved.     • Vitamin D deficiency      Past Surgical History:   Procedure Laterality Date   • DENTAL SURGERY  05/2017    abscess     Social History     Substance and Sexual Activity   Alcohol Use Yes   • Alcohol/week: 1.0 standard drink of alcohol   • Types: 1 Glasses of wine per week    Comment: social     Social History     Substance and Sexual Activity   Drug Use Never     Social History     Tobacco Use   Smoking Status Never   Smokeless Tobacco Never     Family History:   Family History   Problem Relation Age of Onset   • Diabetes Mother    • Hypertension Mother    • Thyroid nodules Mother    • Diabetes Father    • Hypertension Father    • Tuberculosis Father    • Cancer Father         bladder   • No Known Problems Maternal Aunt    • No Known Problems Maternal Aunt    • No Known Problems Maternal Aunt    • No Known Problems Maternal Aunt    • No Known Problems Paternal Aunt    • No Known Problems Paternal Aunt    • No Known Problems Paternal Aunt        Meds/Allergies     Allergies   Allergen Reactions   • Omnipaque [Iohexol] Hives, Vomiting and Blisters     Patient received contrast for the 1st time today 10/03/2022. Visible raised skin rash on abdomen, flank, back, and arms. Fluid filled blisters and erythema on bilateral lower legs. Also had vomiting immediately post contrast injection. She received 100mL of omnipaque during study. GEOFF [facility] RN        Current Outpatient Medications:   •  calcium gluconate 500 mg tablet, Take 1 tablet (500 mg total) by mouth daily, Disp: 90 tablet, Rfl: 3  •  multivitamin (THERAGRAN) TABS, Take 1 tablet by mouth daily, Disp: , Rfl:   •  pantoprazole (PROTONIX) 20 mg tablet, Take 1 tablet (20 mg total) by mouth daily, Disp: 90 tablet, Rfl: 3  •  B Complex Vitamins (Vitamin B Complex) TABS, Take 1  "tablet by mouth daily (Patient not taking: Reported on 3/29/2023), Disp: 90 tablet, Rfl: 3  •  Cholecalciferol (Vitamin D) 50 MCG (2000 UT) tablet, Take 1 tablet (2,000 Units total) by mouth daily (Patient not taking: Reported on 3/29/2023), Disp: 90 tablet, Rfl: 3  •  Magnesium 200 MG TABS, Take 1 tablet (200 mg total) by mouth in the morning (Patient not taking: Reported on 3/29/2023), Disp: 90 tablet, Rfl: 3    Vitals: Blood pressure 96/60, pulse 59, height 5' 1\" (1.549 m), weight 47.6 kg (105 lb), last menstrual period 05/23/2021, SpO2 97%.  ?  Body mass index is 19.84 kg/m².  Wt Readings from Last 3 Encounters:   01/18/24 47.6 kg (105 lb)   12/27/23 49 kg (108 lb)   12/19/23 49 kg (108 lb)     Vitals:    01/18/24 1423   Weight: 47.6 kg (105 lb)     BP Readings from Last 3 Encounters:   01/18/24 96/60   12/13/23 108/72   11/02/23 102/66         Physical Exam    Neurologic:  Alert & oriented x 3, no new focal deficits, Not in any acute distress,  Constitutional:  Adequate built, non-toxic appearance   Neck: Normal range of motion, no tenderness,  Neck supple   Respiratory:  Bilateral air entry, mostly clear to auscultation  Cardiovascular: S1-S2 regular with a no gallops or murmur.  GI:  Soft, nondistended,  nontender, no hepatosplenomegaly appreciated.  Musculoskeletal:  No edema, no tenderness, no deformities.   Skin:  Well hydrated, no rash   Extremities:  No edema and distal pulses are present  Psychiatric:  Speech and behavior appropriate     Diagnostic Studies Review Cardio:  None recently some people have short    EKG:  Normal sinus rhythm heart rate 59 bpm with short WV.  No other significant abnormality.    MRI brain cavernous / trigeminal wo and w contrast    Result Date: 1/17/2024  Narrative: MRI BRAIN AND TRIGEMINAL-  WITH AND WITHOUT CONTRAST INDICATION: G24.5: Blepharospasm. COMPARISON:  None. TECHNIQUE: Multiplanar, multisequence imaging of the brain and brainstem was performed before and after " gadolinium administration. Targeted images of the brainstem were performed requiring additional time at acquisition and interpretation of approximately 25% IV Contrast:  5 mL of Gadobutrol injection (SINGLE-DOSE) IMAGE QUALITY:   Diagnostic. FINDINGS: BRAIN PARENCHYMA:  There is no discrete mass, mass effect or midline shift.  Brainstem and cerebellum demonstrate normal signal. There is no intracranial hemorrhage.  There is no evidence of acute infarction and diffusion imaging is unremarkable.  There are no white matter changes in the cerebral hemispheres. Normal postcontrast imaging. BRAINSTEM:  Normal trigeminal nerves.  Normal signal with no abnormal enhancement.  Meckles cave is normal bilaterally.  No evidence of vascular compression. No cerebellopontine angle mass.  No fatty atrophy of the visualized muscles of mastication. VENTRICLES:  Normal for the patient's age. SELLA AND PITUITARY GLAND:  Normal. ORBITS:  Normal. PARANASAL SINUSES:  Normal. VASCULATURE:  Evaluation of the major intracranial vasculature demonstrates appropriate flow voids. CALVARIUM AND SKULL BASE: Marrow signal heterogeneity which can be seen with underlying red marrow proliferation. EXTRACRANIAL SOFT TISSUES:  Normal.     Impression: No acute infarction, edema, or pathologic intra-axial enhancement. The trigeminal nerves appear normal in morphology. Workstation performed: [id number]                 Lab Review   Lab Results   Component Value Date    WBC 3.8 08/19/2023    HGB 13.2 08/19/2023    HCT 40.5 08/19/2023    MCV 91.2 08/19/2023    RDW 12.6 08/19/2023     08/19/2023     BMP:  Lab Results   Component Value Date    SODIUM 142 08/19/2023    K 3.9 08/19/2023     08/19/2023    CO2 30 08/19/2023    BUN 15 08/19/2023    CREATININE 0.63 08/19/2023    GLUC 108 (H) 08/19/2023    CALCIUM 9.5 08/19/2023    EGFR 107 08/19/2023    MG 2.2 08/19/2023       LFT:  Lab Results   Component Value Date    AST 18 08/19/2023    ALT 15  "08/19/2023    ALKPHOS 61 08/19/2023    TP 7.0 08/19/2023    ALB 4.3 08/19/2023      Lab Results   Component Value Date    EIB7PFHZAAWI 2.200 05/13/2017     No components found for: \"TSH3\"  Lab Results   Component Value Date    HGBA1C 6.1 (H) 08/19/2023     Lipid Profile:   Lab Results   Component Value Date    CHOLESTEROL 215 (H) 08/19/2023    HDL 89 08/19/2023    LDLCALC 111 (H) 08/19/2023    TRIG 65 08/19/2023     Lab Results   Component Value Date    CHOLESTEROL 215 (H) 08/19/2023    CHOLESTEROL 233 (H) 11/02/2022     The 10-year ASCVD risk score (Juanis CANTOR, et al., 2019) is: 0.5%    Values used to calculate the score:      Age: 53 years      Sex: Female      Is Non- : No      Diabetic: No      Tobacco smoker: No      Systolic Blood Pressure: 96 mmHg      Is BP treated: No      HDL Cholesterol: 89 mg/dL      Total Cholesterol: 215 mg/dL       Dr. Bowen Spivey MD [facility]      \"This note was completed in part utilizing m-modal fluency direct voice recognition software.   Grammatical errors, random word insertion, spelling mistakes, and incomplete sentences may be an occasional consequence of the system secondary to software limitations, ambient noise and hardware issues.    Please read the chart carefully and recognize, using context, where substitutions have occurred.  If you have any questions or concerns about the context, text or information contained within the body of this dictation, please contact myself, the provider, for further clarification.\"  "

## 2024-01-19 ENCOUNTER — TELEPHONE (OUTPATIENT)
Dept: CARDIOLOGY CLINIC | Facility: CLINIC | Age: 54
End: 2024-01-19

## 2024-01-26 DIAGNOSIS — K21.00 GASTROESOPHAGEAL REFLUX DISEASE WITH ESOPHAGITIS WITHOUT HEMORRHAGE: ICD-10-CM

## 2024-01-26 RX ORDER — PANTOPRAZOLE SODIUM 20 MG/1
20 TABLET, DELAYED RELEASE ORAL DAILY
Qty: 90 TABLET | Refills: 1 | Status: SHIPPED | OUTPATIENT
Start: 2024-01-26

## 2024-01-26 NOTE — TELEPHONE ENCOUNTER
Reason for call:   [x] Refill   [] Prior Auth  [] Other:     Office:   [] PCP/Provider -   [x] Specialty/Provider - Gi, Dr Calvo    Medication:   Patoprazole 20 mg, 1 qd 90      Pharmacy: [facility]  [place]    Does the patient have enough for 3 days?   [x] Yes   [] No - Send as HP to POD

## 2024-01-30 LAB
ALBUMIN SERPL-MCNC: 4.4 G/DL (ref 3.6–5.1)
ALBUMIN/GLOB SERPL: 1.6 (CALC) (ref 1–2.5)
ALP SERPL-CCNC: 59 U/L (ref 37–153)
ALT SERPL-CCNC: 26 U/L (ref 6–29)
AST SERPL-CCNC: 23 U/L (ref 10–35)
BASOPHILS # BLD AUTO: 61 CELLS/UL (ref 0–200)
BASOPHILS NFR BLD AUTO: 1.9 %
BILIRUB SERPL-MCNC: 0.7 MG/DL (ref 0.2–1.2)
BUN SERPL-MCNC: 14 MG/DL (ref 7–25)
BUN/CREAT SERPL: ABNORMAL (CALC) (ref 6–22)
CALCIUM SERPL-MCNC: 9.5 MG/DL (ref 8.6–10.4)
CHLORIDE SERPL-SCNC: 104 MMOL/L (ref 98–110)
CHOLEST SERPL-MCNC: 189 MG/DL
CHOLEST/HDLC SERPL: 2.7 (CALC)
CO2 SERPL-SCNC: 29 MMOL/L (ref 20–32)
CREAT SERPL-MCNC: 0.7 MG/DL (ref 0.5–1.03)
EOSINOPHIL # BLD AUTO: 141 CELLS/UL (ref 15–500)
EOSINOPHIL NFR BLD AUTO: 4.4 %
ERYTHROCYTE [DISTWIDTH] IN BLOOD BY AUTOMATED COUNT: 13.1 % (ref 11–15)
GFR/BSA.PRED SERPLBLD CYS-BASED-ARV: 104 ML/MIN/1.73M2
GLOBULIN SER CALC-MCNC: 2.7 G/DL (CALC) (ref 1.9–3.7)
GLUCOSE SERPL-MCNC: 100 MG/DL (ref 65–99)
HBA1C MFR BLD: 5.8 % OF TOTAL HGB
HCT VFR BLD AUTO: 40.8 % (ref 35–45)
HDLC SERPL-MCNC: 70 MG/DL
HGB BLD-MCNC: 13.7 G/DL (ref 11.7–15.5)
LDLC SERPL CALC-MCNC: 106 MG/DL (CALC)
LYMPHOCYTES # BLD AUTO: 1626 CELLS/UL (ref 850–3900)
LYMPHOCYTES NFR BLD AUTO: 50.8 %
MCH RBC QN AUTO: 30 PG (ref 27–33)
MCHC RBC AUTO-ENTMCNC: 33.6 G/DL (ref 32–36)
MCV RBC AUTO: 89.3 FL (ref 80–100)
MONOCYTES # BLD AUTO: 189 CELLS/UL (ref 200–950)
MONOCYTES NFR BLD AUTO: 5.9 %
NEUTROPHILS # BLD AUTO: 1184 CELLS/UL (ref 1500–7800)
NEUTROPHILS NFR BLD AUTO: 37 %
NONHDLC SERPL-MCNC: 119 MG/DL (CALC)
PLATELET # BLD AUTO: 294 THOUSAND/UL (ref 140–400)
PMV BLD REES-ECKER: 10.6 FL (ref 7.5–12.5)
POTASSIUM SERPL-SCNC: 4.2 MMOL/L (ref 3.5–5.3)
PROT SERPL-MCNC: 7.1 G/DL (ref 6.1–8.1)
RBC # BLD AUTO: 4.57 MILLION/UL (ref 3.8–5.1)
SODIUM SERPL-SCNC: 140 MMOL/L (ref 135–146)
TRIGL SERPL-MCNC: 52 MG/DL
WBC # BLD AUTO: 3.2 THOUSAND/UL (ref 3.8–10.8)

## 2024-01-31 ENCOUNTER — TELEPHONE (OUTPATIENT)
Dept: FAMILY MEDICINE CLINIC | Facility: CLINIC | Age: 54
End: 2024-01-31

## 2024-01-31 DIAGNOSIS — R79.89 ABNORMAL CBC: Primary | ICD-10-CM

## 2024-01-31 NOTE — TELEPHONE ENCOUNTER
Pt returned our call. Providers result note given.     Pt does have a tooth abscess and is having another root canal on 2/14/24. And she does have a pancreatic cyst that is under surveillance.     Is there anything else that could be causing her low WBC? Please advise.

## 2024-02-14 ENCOUNTER — HOSPITAL ENCOUNTER (OUTPATIENT)
Dept: NON INVASIVE DIAGNOSTICS | Facility: HOSPITAL | Age: 54
Discharge: HOME | End: 2024-02-14
Attending: INTERNAL MEDICINE
Payer: COMMERCIAL

## 2024-02-14 ENCOUNTER — HOSPITAL ENCOUNTER (OUTPATIENT)
Dept: CT IMAGING | Facility: HOSPITAL | Age: 54
Discharge: HOME | End: 2024-02-14
Attending: INTERNAL MEDICINE
Payer: COMMERCIAL

## 2024-02-14 VITALS
DIASTOLIC BLOOD PRESSURE: 68 MMHG | WEIGHT: 105 LBS | HEART RATE: 60 BPM | SYSTOLIC BLOOD PRESSURE: 105 MMHG | HEIGHT: 61 IN | BODY MASS INDEX: 19.83 KG/M2

## 2024-02-14 DIAGNOSIS — E78.5 DYSLIPIDEMIA: ICD-10-CM

## 2024-02-14 DIAGNOSIS — Z82.49 FAMILY HISTORY OF HEART DISEASE IN BROTHER: ICD-10-CM

## 2024-02-14 DIAGNOSIS — R73.03 PREDIABETES: ICD-10-CM

## 2024-02-14 DIAGNOSIS — R55 SYNCOPE, UNSPECIFIED SYNCOPE TYPE: ICD-10-CM

## 2024-02-14 LAB
CHEST PAIN STATEMENT: NORMAL
MAX DIASTOLIC BP: 80 MMHG
MAX PREDICTED HEART RATE: 168 BPM
PROTOCOL NAME: NORMAL
REASON FOR TERMINATION: NORMAL
STRESS POST EXERCISE DUR MIN: 11 MIN
STRESS POST EXERCISE DUR SEC: 25 SEC
STRESS POST PEAK HR: 136 BPM
STRESS POST PEAK SYSTOLIC BP: 130 MMHG
TARGET HR FORMULA: NORMAL
TEST INDICATION: NORMAL

## 2024-02-14 PROCEDURE — 93306 TTE W/DOPPLER COMPLETE: CPT | Performed by: INTERNAL MEDICINE

## 2024-02-14 PROCEDURE — 75571 CT HRT W/O DYE W/CA TEST: CPT

## 2024-02-14 PROCEDURE — 93018 CV STRESS TEST I&R ONLY: CPT | Performed by: INTERNAL MEDICINE

## 2024-02-14 PROCEDURE — 93016 CV STRESS TEST SUPVJ ONLY: CPT | Performed by: INTERNAL MEDICINE

## 2024-02-14 PROCEDURE — 93306 TTE W/DOPPLER COMPLETE: CPT

## 2024-02-14 PROCEDURE — 93017 CV STRESS TEST TRACING ONLY: CPT

## 2024-02-14 PROCEDURE — G1004 CDSM NDSC: HCPCS

## 2024-02-15 LAB
AORTIC ROOT: 2.5 CM
APICAL FOUR CHAMBER EJECTION FRACTION: 78 %
BSA FOR ECHO PROCEDURE: 1.44 M2
E WAVE DECELERATION TIME: 193 MS
E/A RATIO: 1.88
FRACTIONAL SHORTENING: 37 (ref 28–44)
INTERVENTRICULAR SEPTUM IN DIASTOLE (PARASTERNAL SHORT AXIS VIEW): 0.8 CM
INTERVENTRICULAR SEPTUM: 0.8 CM (ref 0.6–1.1)
LAAS-AP2: 14.3 CM2
LAAS-AP4: 10.7 CM2
LEFT ATRIUM SIZE: 3 CM
LEFT ATRIUM VOLUME (MOD BIPLANE): 29 ML
LEFT ATRIUM VOLUME INDEX (MOD BIPLANE): 20.1 ML/M2
LEFT INTERNAL DIMENSION IN SYSTOLE: 2.6 CM (ref 2.1–4)
LEFT VENTRICULAR INTERNAL DIMENSION IN DIASTOLE: 4.1 CM (ref 3.5–6)
LEFT VENTRICULAR POSTERIOR WALL IN END DIASTOLE: 0.8 CM
LEFT VENTRICULAR STROKE VOLUME: 48 ML
LVSV (TEICH): 48 ML
MAX HR PERCENT: 80 %
MAX HR: 136 BPM
MV E'TISSUE VEL-LAT: 14 CM/S
MV E'TISSUE VEL-SEP: 15 CM/S
MV PEAK A VEL: 0.43 M/S
MV PEAK E VEL: 81 CM/S
MV STENOSIS PRESSURE HALF TIME: 56 MS
MV VALVE AREA P 1/2 METHOD: 3.93
RATE PRESSURE PRODUCT: NORMAL
RIGHT ATRIUM AREA SYSTOLE A4C: 10.5 CM2
RIGHT VENTRICLE ID DIMENSION: 2.7 CM
SL CV LEFT ATRIUM LENGTH A2C: 4.6 CM
SL CV LV EF: 60
SL CV PED ECHO LEFT VENTRICLE DIASTOLIC VOLUME (MOD BIPLANE) 2D: 73 ML
SL CV PED ECHO LEFT VENTRICLE SYSTOLIC VOLUME (MOD BIPLANE) 2D: 25 ML
SL CV STRESS RECOVERY BP: NORMAL MMHG
SL CV STRESS RECOVERY HR: 88 BPM
SL CV STRESS RECOVERY O2 SAT: 98 %
STRESS ANGINA INDEX: 0
STRESS BASELINE BP: NORMAL MMHG
STRESS BASELINE HR: 62 BPM
STRESS O2 SAT REST: 98 %
STRESS PEAK HR: 136 BPM
STRESS POST ESTIMATED WORKLOAD: 13.4 METS
STRESS POST EXERCISE DUR MIN: 11 MIN
STRESS POST EXERCISE DUR SEC: 25 SEC
STRESS POST O2 SAT PEAK: 98 %
STRESS POST PEAK BP: 130 MMHG
TR MAX PG: 15 MMHG
TR PEAK VELOCITY: 1.9 M/S
TRICUSPID ANNULAR PLANE SYSTOLIC EXCURSION: 2 CM
TRICUSPID VALVE PEAK REGURGITATION VELOCITY: 1.94 M/S

## 2024-02-16 ENCOUNTER — TELEPHONE (OUTPATIENT)
Dept: CARDIOLOGY CLINIC | Facility: CLINIC | Age: 54
End: 2024-02-16

## 2024-02-16 NOTE — TELEPHONE ENCOUNTER
----- Message from Bowen Spivey MD sent at 2/15/2024 11:00 AM EST -----  Patient's echo shows normal LV systolic function.  No significant, to mild valvular disease.  Patient can keep an appointment.    Please call patient about echo report.    Patient stress test shows some EKG changes  She will need a stress echo test.  If she agrees I will order it.  I think the test is falsely equivocal but we need to do further testing.

## 2024-02-19 DIAGNOSIS — Z82.49 FAMILY HISTORY OF HEART DISEASE IN BROTHER: ICD-10-CM

## 2024-02-19 DIAGNOSIS — R73.03 PREDIABETES: ICD-10-CM

## 2024-02-19 DIAGNOSIS — E78.5 DYSLIPIDEMIA: ICD-10-CM

## 2024-02-19 DIAGNOSIS — R55 SYNCOPE, UNSPECIFIED SYNCOPE TYPE: Primary | ICD-10-CM

## 2024-02-19 DIAGNOSIS — R94.39 ABNORMAL STRESS ECG WITH TREADMILL: ICD-10-CM

## 2024-02-20 ENCOUNTER — TELEPHONE (OUTPATIENT)
Dept: CARDIOLOGY CLINIC | Facility: CLINIC | Age: 54
End: 2024-02-20

## 2024-02-20 NOTE — TELEPHONE ENCOUNTER
Pt notified of results.   Nuclear scheduled for Friday due to abn stress test.     Patient is concerned due to abn stress test - even if calcium score is 0 can she still experience narrowing of her arteries? Possibly another component that would create a blockage?

## 2024-02-20 NOTE — TELEPHONE ENCOUNTER
----- Message from Bowen Spivey MD sent at 2/20/2024 11:23 AM EST -----  Patient's calcium score is 0 at this time she does not need any statin therapy.  But she should get her nuclear stress test done.

## 2024-02-23 ENCOUNTER — HOSPITAL ENCOUNTER (OUTPATIENT)
Dept: RADIOLOGY | Facility: HOSPITAL | Age: 54
Discharge: HOME | End: 2024-02-23
Attending: INTERNAL MEDICINE
Payer: COMMERCIAL

## 2024-02-23 ENCOUNTER — TELEPHONE (OUTPATIENT)
Dept: CARDIOLOGY CLINIC | Facility: CLINIC | Age: 54
End: 2024-02-23

## 2024-02-23 ENCOUNTER — HOSPITAL ENCOUNTER (OUTPATIENT)
Dept: NON INVASIVE DIAGNOSTICS | Facility: HOSPITAL | Age: 54
Discharge: HOME | End: 2024-02-23
Attending: INTERNAL MEDICINE
Payer: COMMERCIAL

## 2024-02-23 VITALS
HEART RATE: 68 BPM | DIASTOLIC BLOOD PRESSURE: 60 MMHG | SYSTOLIC BLOOD PRESSURE: 100 MMHG | OXYGEN SATURATION: 100 % | RESPIRATION RATE: 20 BRPM

## 2024-02-23 DIAGNOSIS — E78.5 DYSLIPIDEMIA: ICD-10-CM

## 2024-02-23 DIAGNOSIS — R94.39 ABNORMAL STRESS ECG WITH TREADMILL: ICD-10-CM

## 2024-02-23 DIAGNOSIS — R55 SYNCOPE, UNSPECIFIED SYNCOPE TYPE: ICD-10-CM

## 2024-02-23 DIAGNOSIS — Z82.49 FAMILY HISTORY OF HEART DISEASE IN BROTHER: ICD-10-CM

## 2024-02-23 DIAGNOSIS — R73.03 PREDIABETES: ICD-10-CM

## 2024-02-23 LAB
CHEST PAIN STATEMENT: NORMAL
MAX DIASTOLIC BP: 70 MMHG
MAX HR PERCENT: 92 %
MAX HR: 155 BPM
MAX PREDICTED HEART RATE: 168 BPM
NUC STRESS EJECTION FRACTION: 75 %
PROTOCOL NAME: NORMAL
RATE PRESSURE PRODUCT: NORMAL
SL CV REST NUCLEAR ISOTOPE DOSE: 10.73 MCI
SL CV STRESS NUCLEAR ISOTOPE DOSE: 31.2 MCI
SL CV STRESS RECOVERY BP: NORMAL MMHG
SL CV STRESS RECOVERY HR: 96 BPM
SL CV STRESS RECOVERY O2 SAT: 97 %
STRESS ANGINA INDEX: 0
STRESS BASELINE BP: NORMAL MMHG
STRESS BASELINE HR: 68 BPM
STRESS O2 SAT REST: 100 %
STRESS PEAK HR: 150 BPM
STRESS POST ESTIMATED WORKLOAD: 14.9 METS
STRESS POST EXERCISE DUR MIN: 12 MIN
STRESS POST EXERCISE DUR MIN: 12 MIN
STRESS POST EXERCISE DUR SEC: 26 SEC
STRESS POST EXERCISE DUR SEC: 26 SEC
STRESS POST O2 SAT PEAK: 98 %
STRESS POST PEAK BP: 126 MMHG
STRESS POST PEAK HR: 155 BPM
STRESS POST PEAK SYSTOLIC BP: 126 MMHG
STRESS/REST PERFUSION RATIO: 1
TARGET HR FORMULA: NORMAL
TEST INDICATION: NORMAL

## 2024-02-23 PROCEDURE — 93018 CV STRESS TEST I&R ONLY: CPT | Performed by: INTERNAL MEDICINE

## 2024-02-23 PROCEDURE — G1004 CDSM NDSC: HCPCS

## 2024-02-23 PROCEDURE — 93016 CV STRESS TEST SUPVJ ONLY: CPT | Performed by: INTERNAL MEDICINE

## 2024-02-23 PROCEDURE — 78452 HT MUSCLE IMAGE SPECT MULT: CPT

## 2024-02-23 PROCEDURE — 93017 CV STRESS TEST TRACING ONLY: CPT

## 2024-02-23 PROCEDURE — 78452 HT MUSCLE IMAGE SPECT MULT: CPT | Performed by: INTERNAL MEDICINE

## 2024-02-23 PROCEDURE — A9502 TC99M TETROFOSMIN: HCPCS

## 2024-02-23 NOTE — TELEPHONE ENCOUNTER
----- Message from Bowen Spivey MD sent at 2/23/2024  2:35 PM EST -----  Pt's Patient's stress test is normal.   Patient can keep regular appointment.    Please call patient with the result.

## 2024-02-23 NOTE — RESULT ENCOUNTER NOTE
Pt's Patient's stress test is normal.   Patient can keep regular appointment.    Please call patient with the result.

## 2024-03-04 ENCOUNTER — TELEPHONE (OUTPATIENT)
Dept: CARDIOLOGY CLINIC | Facility: CLINIC | Age: 54
End: 2024-03-04

## 2024-03-04 ENCOUNTER — CLINICAL SUPPORT (OUTPATIENT)
Dept: CARDIOLOGY CLINIC | Facility: CLINIC | Age: 54
End: 2024-03-04
Payer: COMMERCIAL

## 2024-03-04 DIAGNOSIS — R55 SYNCOPE, UNSPECIFIED SYNCOPE TYPE: ICD-10-CM

## 2024-03-04 DIAGNOSIS — Z82.49 FAMILY HISTORY OF HEART DISEASE IN BROTHER: ICD-10-CM

## 2024-03-04 DIAGNOSIS — E78.5 DYSLIPIDEMIA: ICD-10-CM

## 2024-03-04 DIAGNOSIS — R73.03 PREDIABETES: ICD-10-CM

## 2024-03-04 PROCEDURE — 93248 EXT ECG>7D<15D REV&INTERPJ: CPT | Performed by: INTERNAL MEDICINE

## 2024-03-04 NOTE — TELEPHONE ENCOUNTER
----- Message from Bowen Spivey MD sent at 3/4/2024  1:13 PM EST -----  Patient had a min HR of 48 bpm, max HR of 125 bpm, and avg HR of 68  bpm. Predominant underlying rhythm was Sinus Rhythm. Slight P wave  morphology changes were noted. Isolated SVEs were rare (<1.0%), SVE  Couplets were rare (<1.0%), and SVE Triplets were rare (<1.0%). VE  Couplets were rare (<1.0%), and no Isolated VEs or VE Triplets were  present.    Patient's monitor shows average heart rate 68 bpm no evidence of any significant tacky bradycardia arrhythmias.

## 2024-05-29 DIAGNOSIS — Z00.6 ENCOUNTER FOR EXAMINATION FOR NORMAL COMPARISON OR CONTROL IN CLINICAL RESEARCH PROGRAM: ICD-10-CM

## 2024-06-04 ENCOUNTER — APPOINTMENT (OUTPATIENT)
Dept: LAB | Facility: HOSPITAL | Age: 54
End: 2024-06-04

## 2024-06-04 DIAGNOSIS — Z00.6 ENCOUNTER FOR EXAMINATION FOR NORMAL COMPARISON OR CONTROL IN CLINICAL RESEARCH PROGRAM: ICD-10-CM

## 2024-06-04 PROCEDURE — 36415 COLL VENOUS BLD VENIPUNCTURE: CPT

## 2024-06-05 DIAGNOSIS — E78.00 HYPERCHOLESTEROLEMIA: ICD-10-CM

## 2024-06-05 DIAGNOSIS — R73.01 IMPAIRED FASTING GLUCOSE: Primary | ICD-10-CM

## 2024-06-05 DIAGNOSIS — E55.9 VITAMIN D DEFICIENCY: ICD-10-CM

## 2024-06-05 DIAGNOSIS — R79.89 ABNORMAL CBC: ICD-10-CM

## 2024-06-15 LAB
25(OH)D3 SERPL-MCNC: 25 NG/ML (ref 30–100)
ALBUMIN SERPL-MCNC: 4.5 G/DL (ref 3.6–5.1)
ALBUMIN/GLOB SERPL: 1.7 (CALC) (ref 1–2.5)
ALP SERPL-CCNC: 69 U/L (ref 37–153)
ALT SERPL-CCNC: 30 U/L (ref 6–29)
AST SERPL-CCNC: 26 U/L (ref 10–35)
BASOPHILS # BLD AUTO: 51 CELLS/UL (ref 0–200)
BASOPHILS NFR BLD AUTO: 1.3 %
BILIRUB SERPL-MCNC: 0.4 MG/DL (ref 0.2–1.2)
BUN SERPL-MCNC: 18 MG/DL (ref 7–25)
BUN/CREAT SERPL: ABNORMAL (CALC) (ref 6–22)
CALCIUM SERPL-MCNC: 9.4 MG/DL (ref 8.6–10.4)
CHLORIDE SERPL-SCNC: 104 MMOL/L (ref 98–110)
CHOLEST SERPL-MCNC: 206 MG/DL
CHOLEST/HDLC SERPL: 2.7 (CALC)
CO2 SERPL-SCNC: 27 MMOL/L (ref 20–32)
CREAT SERPL-MCNC: 0.61 MG/DL (ref 0.5–1.03)
EOSINOPHIL # BLD AUTO: 129 CELLS/UL (ref 15–500)
EOSINOPHIL NFR BLD AUTO: 3.3 %
ERYTHROCYTE [DISTWIDTH] IN BLOOD BY AUTOMATED COUNT: 13.1 % (ref 11–15)
EST. AVERAGE GLUCOSE BLD GHB EST-MCNC: 137 MG/DL
EST. AVERAGE GLUCOSE BLD GHB EST-SCNC: 7.6 MMOL/L
GFR/BSA.PRED SERPLBLD CYS-BASED-ARV: 107 ML/MIN/1.73M2
GLOBULIN SER CALC-MCNC: 2.6 G/DL (CALC) (ref 1.9–3.7)
GLUCOSE SERPL-MCNC: 96 MG/DL (ref 65–99)
HBA1C MFR BLD: 6.4 % OF TOTAL HGB
HCT VFR BLD AUTO: 39.1 % (ref 35–45)
HDLC SERPL-MCNC: 77 MG/DL
HGB BLD-MCNC: 12.9 G/DL (ref 11.7–15.5)
LDLC SERPL CALC-MCNC: 116 MG/DL (CALC)
LYMPHOCYTES # BLD AUTO: 1685 CELLS/UL (ref 850–3900)
LYMPHOCYTES NFR BLD AUTO: 43.2 %
MCH RBC QN AUTO: 29.7 PG (ref 27–33)
MCHC RBC AUTO-ENTMCNC: 33 G/DL (ref 32–36)
MCV RBC AUTO: 89.9 FL (ref 80–100)
MONOCYTES # BLD AUTO: 199 CELLS/UL (ref 200–950)
MONOCYTES NFR BLD AUTO: 5.1 %
NEUTROPHILS # BLD AUTO: 1837 CELLS/UL (ref 1500–7800)
NEUTROPHILS NFR BLD AUTO: 47.1 %
NONHDLC SERPL-MCNC: 129 MG/DL (CALC)
PLATELET # BLD AUTO: 221 THOUSAND/UL (ref 140–400)
PMV BLD REES-ECKER: 10.2 FL (ref 7.5–12.5)
POTASSIUM SERPL-SCNC: 4.2 MMOL/L (ref 3.5–5.3)
PROT SERPL-MCNC: 7.1 G/DL (ref 6.1–8.1)
RBC # BLD AUTO: 4.35 MILLION/UL (ref 3.8–5.1)
SODIUM SERPL-SCNC: 139 MMOL/L (ref 135–146)
TRIGL SERPL-MCNC: 50 MG/DL
WBC # BLD AUTO: 3.9 THOUSAND/UL (ref 3.8–10.8)

## 2024-06-17 ENCOUNTER — TELEPHONE (OUTPATIENT)
Dept: FAMILY MEDICINE CLINIC | Facility: CLINIC | Age: 54
End: 2024-06-17

## 2024-06-20 ENCOUNTER — TELEMEDICINE (OUTPATIENT)
Dept: FAMILY MEDICINE CLINIC | Facility: CLINIC | Age: 54
End: 2024-06-20
Payer: COMMERCIAL

## 2024-06-20 DIAGNOSIS — K21.00 GASTROESOPHAGEAL REFLUX DISEASE WITH ESOPHAGITIS WITHOUT HEMORRHAGE: ICD-10-CM

## 2024-06-20 DIAGNOSIS — E55.9 VITAMIN D DEFICIENCY: ICD-10-CM

## 2024-06-20 DIAGNOSIS — R73.01 IMPAIRED FASTING GLUCOSE: ICD-10-CM

## 2024-06-20 DIAGNOSIS — R74.01 ELEVATED ALT MEASUREMENT: ICD-10-CM

## 2024-06-20 DIAGNOSIS — E78.00 HYPERCHOLESTEROLEMIA: Primary | ICD-10-CM

## 2024-06-20 DIAGNOSIS — Z79.899 ENCOUNTER FOR LONG-TERM CURRENT USE OF MEDICATION: ICD-10-CM

## 2024-06-20 DIAGNOSIS — R73.03 PREDIABETES: ICD-10-CM

## 2024-06-20 PROCEDURE — 99214 OFFICE O/P EST MOD 30 MIN: CPT | Performed by: FAMILY MEDICINE

## 2024-06-20 RX ORDER — ERGOCALCIFEROL 1.25 MG/1
50000 CAPSULE ORAL WEEKLY
Qty: 12 CAPSULE | Refills: 0 | Status: SHIPPED | OUTPATIENT
Start: 2024-06-20 | End: 2024-09-06

## 2024-06-20 NOTE — PROGRESS NOTES
Virtual Regular Visit    Verification of patient location:    Patient is located at Other in the following state in which I hold an active license [place]      Assessment/Plan:    Problem List Items Addressed This Visit     Esophageal reflux     Stable  Continue pantoprazole 20 mg daily         Relevant Orders    CBC    Comprehensive metabolic panel    Hypercholesterolemia - Primary     Not controlled  She is going to work on diet and exercise         Relevant Orders    Lipid Panel with Direct LDL reflex    Prediabetes     Hemoglobin A1c has worsened  Low sugar diet and exercise discussed at length  Will recheck labs in 3 months    Hemoglobin A1C   Date Value Ref Range Status   06/14/2024 6.4 (H) <5.7 % of total Hgb Final     Comment:     For someone without known diabetes, a hemoglobin   A1c value between 5.7% and 6.4% is consistent with  prediabetes and should be confirmed with a   follow-up test.     For someone with known diabetes, a value <7%  indicates that their diabetes is well controlled. A1c  targets should be individualized based on duration of  diabetes, age, comorbid conditions, and other  considerations.     This assay result is consistent with an increased risk  of diabetes.     Currently, no consensus exists regarding use of  hemoglobin A1c for diagnosis of diabetes for children.                 Relevant Orders    Hemoglobin A1C With EAG    Comprehensive metabolic panel    Vitamin D deficiency     Vitamin D level is not at goal  Started on vitamin D prescription         Relevant Medications    ergocalciferol (ERGOCALCIFEROL) 1.25 MG (23642 UT) capsule   Other Visit Diagnoses     Impaired fasting glucose        Elevated ALT measurement        Relevant Orders    CBC    Comprehensive metabolic panel    Encounter for long-term current use of medication        Relevant Orders    Vitamin B12    Magnesium      Return in about 3 months (around 9/20/2024) for Next scheduled follow up.         Reason for visit is    Chief Complaint   Patient presents with   • Follow-up   • Results     Sas/cma   • Virtual Regular Visit          Encounter provider Desi Hurt, DO      Recent Visits  Date Type Provider Dept   06/17/24 Telephone Desi Hurt DO [facility]   Showing recent visits within past 7 days and meeting all other requirements  Today's Visits  Date Type Provider Dept   06/20/24 Telemedicine Desi Hurt DO [facility]   Showing today's visits and meeting all other requirements  Future Appointments  No visits were found meeting these conditions.  Showing future appointments within next 150 days and meeting all other requirements       The patient was identified by name and date of birth. Stefany Medina was informed that this is a telemedicine visit and that the visit is being conducted through the Epic Embedded platform. She agrees to proceed..  My office door was closed. No one else was in the room.  She acknowledged consent and understanding of privacy and security of the video platform. The patient has agreed to participate and understands they can discontinue the visit at any time.    Patient is aware this is a billable service.     Subjective  Stefany Medina is a 54 y.o. female       She just got back from 3 weeks in [place] and was eating more than she should.  She was also eating a lot of very sweet fruit.          Past Medical History:   Diagnosis Date   • COVID-19 vaccine series completed     12/19/20 and 1/9/21-in EPIC   • GERD (gastroesophageal reflux disease)    • Upper respiratory tract infection 3/23/2020    She does not meet criteria for covid testing, although I feel she should not return to work at this time until her symptoms have improved.     • Vitamin D deficiency        Past Surgical History:   Procedure Laterality Date   • DENTAL SURGERY  05/2017    abscess       Current Outpatient Medications   Medication Sig Dispense Refill   • ergocalciferol (ERGOCALCIFEROL) 1.25 MG (25915 UT) capsule Take 1 capsule  (50,000 Units total) by mouth once a week for 12 doses 12 capsule 0   • calcium gluconate 500 mg tablet Take 1 tablet (500 mg total) by mouth daily 90 tablet 3   • pantoprazole (PROTONIX) 20 mg tablet Take 1 tablet (20 mg total) by mouth daily 90 tablet 1     No current facility-administered medications for this visit.        Allergies   Allergen Reactions   • Omnipaque [Iohexol] Hives, Vomiting and Blisters     Patient received contrast for the 1st time today 10/03/2022. Visible raised skin rash on abdomen, flank, back, and arms. Fluid filled blisters and erythema on bilateral lower legs. Also had vomiting immediately post contrast injection. She received 100mL of omnipaque during study. GE[facility] RN        Review of Systems    Video Exam    There were no vitals filed for this visit.    Physical Exam  Vitals reviewed.   Constitutional:       General: She is not in acute distress.  Pulmonary:      Effort: Pulmonary effort is normal.   Neurological:      Mental Status: She is alert.   Psychiatric:         Behavior: Behavior normal.         Thought Content: Thought content normal.         Judgment: Judgment normal.          Visit Time  Total Visit Duration: 9

## 2024-07-26 DIAGNOSIS — Z20.1 EXPOSURE TO TB: Primary | ICD-10-CM

## 2024-07-29 ENCOUNTER — APPOINTMENT (OUTPATIENT)
Dept: LAB | Facility: HOSPITAL | Age: 54
End: 2024-07-29

## 2024-07-29 DIAGNOSIS — Z20.1 EXPOSURE TO TB: ICD-10-CM

## 2024-07-29 PROCEDURE — 86480 TB TEST CELL IMMUN MEASURE: CPT

## 2024-07-29 PROCEDURE — 36415 COLL VENOUS BLD VENIPUNCTURE: CPT

## 2024-07-30 LAB
GAMMA INTERFERON BACKGROUND BLD IA-ACNC: 0.08 IU/ML
M TB IFN-G BLD-IMP: POSITIVE
M TB IFN-G CD4+ BCKGRND COR BLD-ACNC: 0.52 IU/ML
M TB IFN-G CD4+ BCKGRND COR BLD-ACNC: 0.59 IU/ML
MITOGEN IGNF BCKGRD COR BLD-ACNC: 9.92 IU/ML

## 2024-09-10 LAB
APOB+LDLR+PCSK9 GENE MUT ANL BLD/T: NOT DETECTED
BRCA1+BRCA2 DEL+DUP + FULL MUT ANL BLD/T: NOT DETECTED
MLH1+MSH2+MSH6+PMS2 GN DEL+DUP+FUL M: NOT DETECTED

## 2024-09-13 DIAGNOSIS — E55.9 VITAMIN D DEFICIENCY: ICD-10-CM

## 2024-09-13 RX ORDER — ERGOCALCIFEROL 1.25 MG/1
50000 CAPSULE, LIQUID FILLED ORAL WEEKLY
Qty: 12 CAPSULE | Refills: 0 | Status: SHIPPED | OUTPATIENT
Start: 2024-09-13

## 2024-10-13 LAB
ALBUMIN SERPL-MCNC: 4.3 G/DL (ref 3.6–5.1)
ALBUMIN/GLOB SERPL: 1.7 (CALC) (ref 1–2.5)
ALP SERPL-CCNC: 65 U/L (ref 37–153)
ALT SERPL-CCNC: 20 U/L (ref 6–29)
AST SERPL-CCNC: 23 U/L (ref 10–35)
BILIRUB SERPL-MCNC: 0.5 MG/DL (ref 0.2–1.2)
BUN SERPL-MCNC: 15 MG/DL (ref 7–25)
BUN/CREAT SERPL: ABNORMAL (CALC) (ref 6–22)
CALCIUM SERPL-MCNC: 9.3 MG/DL (ref 8.6–10.4)
CHLORIDE SERPL-SCNC: 104 MMOL/L (ref 98–110)
CHOLEST SERPL-MCNC: 210 MG/DL
CHOLEST/HDLC SERPL: 2.3 (CALC)
CO2 SERPL-SCNC: 28 MMOL/L (ref 20–32)
CREAT SERPL-MCNC: 0.63 MG/DL (ref 0.5–1.03)
ERYTHROCYTE [DISTWIDTH] IN BLOOD BY AUTOMATED COUNT: 12.9 % (ref 11–15)
EST. AVERAGE GLUCOSE BLD GHB EST-MCNC: 131 MG/DL
EST. AVERAGE GLUCOSE BLD GHB EST-SCNC: 7.3 MMOL/L
GFR/BSA.PRED SERPLBLD CYS-BASED-ARV: 106 ML/MIN/1.73M2
GLOBULIN SER CALC-MCNC: 2.6 G/DL (CALC) (ref 1.9–3.7)
GLUCOSE SERPL-MCNC: 100 MG/DL (ref 65–99)
HBA1C MFR BLD: 6.2 % OF TOTAL HGB
HCT VFR BLD AUTO: 41.5 % (ref 35–45)
HDLC SERPL-MCNC: 91 MG/DL
HGB BLD-MCNC: 13.4 G/DL (ref 11.7–15.5)
LDLC SERPL CALC-MCNC: 107 MG/DL (CALC)
MAGNESIUM SERPL-MCNC: 2.2 MG/DL (ref 1.5–2.5)
MCH RBC QN AUTO: 29.9 PG (ref 27–33)
MCHC RBC AUTO-ENTMCNC: 32.3 G/DL (ref 32–36)
MCV RBC AUTO: 92.6 FL (ref 80–100)
NONHDLC SERPL-MCNC: 119 MG/DL (CALC)
PLATELET # BLD AUTO: 238 THOUSAND/UL (ref 140–400)
PMV BLD REES-ECKER: 10.2 FL (ref 7.5–12.5)
POTASSIUM SERPL-SCNC: 4.2 MMOL/L (ref 3.5–5.3)
PROT SERPL-MCNC: 6.9 G/DL (ref 6.1–8.1)
RBC # BLD AUTO: 4.48 MILLION/UL (ref 3.8–5.1)
SODIUM SERPL-SCNC: 141 MMOL/L (ref 135–146)
TRIGL SERPL-MCNC: 41 MG/DL
VIT B12 SERPL-MCNC: 481 PG/ML (ref 200–1100)
WBC # BLD AUTO: 3.2 THOUSAND/UL (ref 3.8–10.8)

## 2024-10-15 ENCOUNTER — TELEPHONE (OUTPATIENT)
Age: 54
End: 2024-10-15

## 2024-10-15 ENCOUNTER — DOCUMENTATION (OUTPATIENT)
Dept: HEMATOLOGY ONCOLOGY | Facility: CLINIC | Age: 54
End: 2024-10-15

## 2024-10-15 NOTE — PROGRESS NOTES
Chart has been clinically reviewed utilizing lab parameters set by hematology office. The parameters have not been met to signify necessity of consultation.  At this time the referral to hematology-oncology will be closed, as there is limited work-up for the specified referral diagnosis. Please place an order for “Amb e-consult to hematology“ and reason for referral in comment section to obtain recommendations for additional testing/work-up. Please notify the patient.    Thank you!  WBC< abnormal X 2 or more consecutive tests  ANC <1500

## 2024-10-15 NOTE — TELEPHONE ENCOUNTER
"Pt called in after seeing the message on her O[employer]il[employer] Systemst in regards to her lab results. Opened up pts chart to locate the message :       \"A1C level indicates your average sugar is in prediabetes range.  Your blood count is normal/stable.  Sugar is in Pre-Diabetes range of 100-125 but your kidneys, minerals and liver are normal.  Your cholesterol profile is normal/ok.  Your vitamin B-12 level is normal.  Magnesium level is normal.  Please call the office to schedule an appointment with Dr. Hurt.\"    Pt scheduled for 10/21  "

## 2024-10-21 ENCOUNTER — OFFICE VISIT (OUTPATIENT)
Dept: FAMILY MEDICINE CLINIC | Facility: CLINIC | Age: 54
End: 2024-10-21
Payer: COMMERCIAL

## 2024-10-21 VITALS
SYSTOLIC BLOOD PRESSURE: 100 MMHG | RESPIRATION RATE: 16 BRPM | HEART RATE: 64 BPM | BODY MASS INDEX: 19.63 KG/M2 | WEIGHT: 104 LBS | TEMPERATURE: 96.2 F | HEIGHT: 61 IN | DIASTOLIC BLOOD PRESSURE: 64 MMHG

## 2024-10-21 DIAGNOSIS — R73.03 PREDIABETES: ICD-10-CM

## 2024-10-21 DIAGNOSIS — Z13.6 SCREENING FOR CARDIOVASCULAR CONDITION: ICD-10-CM

## 2024-10-21 DIAGNOSIS — D72.819 LEUKOPENIA, UNSPECIFIED TYPE: Primary | ICD-10-CM

## 2024-10-21 PROBLEM — K22.70 BARRETT ESOPHAGUS: Status: ACTIVE | Noted: 2024-10-21

## 2024-10-21 PROCEDURE — 99214 OFFICE O/P EST MOD 30 MIN: CPT | Performed by: FAMILY MEDICINE

## 2024-10-21 RX ORDER — METFORMIN HYDROCHLORIDE 500 MG/1
500 TABLET, EXTENDED RELEASE ORAL
Qty: 90 TABLET | Refills: 1 | Status: SHIPPED | OUTPATIENT
Start: 2024-10-21

## 2024-10-21 NOTE — PROGRESS NOTES
"Ambulatory Visit  Name: Stefany Medina      : 1970      MRN: [id number]  Encounter Provider: Desi Hurt DO  Encounter Date: 10/21/2024   Encounter department: [facility]    Assessment & Plan  Leukopenia, unspecified type  I am concerned that the protonix could be releated to her change in WBC  She will stop it and recheck labs   Orders:    CBC and differential; Future    CBC and differential    CBC and differential; Future    CBC and differential    Prediabetes  Has appointment with endocrinology scheduled   Patient is already at ideal body weight and following a healthy diet.  Metformin started as preventative  Risk benefits medication discussed  Orders:    metFORMIN (GLUCOPHAGE-XR) 500 mg 24 hr tablet; Take 1 tablet (500 mg total) by mouth daily with breakfast    Hemoglobin A1C With EAG; Future    Hemoglobin A1C With EAG    Screening for cardiovascular condition    Orders:    Comprehensive metabolic panel; Future    Lipid Panel with Direct LDL reflex; Future    Comprehensive metabolic panel    Lipid Panel with Direct LDL reflex       Return in about 6 months (around 2025) for Next scheduled follow up.    History of Present Illness     She is concerned about her blood sugars.  She is also concerned about her abnormal white blood cell count.  The labs were done right after a trip to [place].          Review of Systems   Constitutional:  Negative for diaphoresis and fever.           Objective     /64   Pulse 64   Temp (!) 96.2 °F (35.7 °C)   Resp 16   Ht 5' 1\" (1.549 m)   Wt 47.2 kg (104 lb)   LMP 2021 (Exact Date)   BMI 19.65 kg/m²     Physical Exam  Vitals and nursing note reviewed.   Constitutional:       Appearance: She is well-developed.   HENT:      Head: Normocephalic and atraumatic.      Right Ear: External ear normal.      Left Ear: External ear normal.      Nose: Nose normal.   Cardiovascular:      Rate and Rhythm: Normal rate and regular rhythm.      Heart sounds: " Normal heart sounds. No murmur heard.     No friction rub.   Pulmonary:      Effort: No respiratory distress.      Breath sounds: Normal breath sounds. No wheezing or rales.

## 2024-10-21 NOTE — ASSESSMENT & PLAN NOTE
Has appointment with endocrinology scheduled   Patient is already at ideal body weight and following a healthy diet.  Metformin started as preventative  Risk benefits medication discussed  Orders:    metFORMIN (GLUCOPHAGE-XR) 500 mg 24 hr tablet; Take 1 tablet (500 mg total) by mouth daily with breakfast    Hemoglobin A1C With EAG; Future    Hemoglobin A1C With EAG

## 2024-10-31 ENCOUNTER — OFFICE VISIT (OUTPATIENT)
Dept: ENDOCRINOLOGY | Facility: CLINIC | Age: 54
End: 2024-10-31
Payer: COMMERCIAL

## 2024-10-31 ENCOUNTER — TELEPHONE (OUTPATIENT)
Dept: ENDOCRINOLOGY | Facility: CLINIC | Age: 54
End: 2024-10-31

## 2024-10-31 VITALS
HEIGHT: 61 IN | WEIGHT: 102 LBS | SYSTOLIC BLOOD PRESSURE: 100 MMHG | OXYGEN SATURATION: 99 % | BODY MASS INDEX: 19.26 KG/M2 | DIASTOLIC BLOOD PRESSURE: 64 MMHG | HEART RATE: 64 BPM

## 2024-10-31 DIAGNOSIS — R73.03 PREDIABETES: Primary | ICD-10-CM

## 2024-10-31 DIAGNOSIS — K86.2 PANCREATIC CYST: ICD-10-CM

## 2024-10-31 DIAGNOSIS — K22.719 BARRETT'S ESOPHAGUS WITH DYSPLASIA: ICD-10-CM

## 2024-10-31 DIAGNOSIS — E55.9 VITAMIN D DEFICIENCY: ICD-10-CM

## 2024-10-31 DIAGNOSIS — M85.80 OSTEOPENIA, UNSPECIFIED LOCATION: ICD-10-CM

## 2024-10-31 PROCEDURE — 99214 OFFICE O/P EST MOD 30 MIN: CPT | Performed by: INTERNAL MEDICINE

## 2024-10-31 NOTE — PROGRESS NOTES
Stefany Medina 54 y.o. female MRN: [id number]    Encounter: [id number]      Assessment & Plan     Problem List Items Addressed This Visit          Digestive    Soria esophagus     She will check with her GI if she needs to continue PPIs         Pancreatic cyst       Musculoskeletal and Integument    Osteopenia     Advised to make sure she is getting calcium 1200 mg daily in diet or supplementations-fall prevention.  Repeat DEXA in 2 years continue vitamin D supplementations         Relevant Orders    Vitamin D 25 hydroxy       Other    Prediabetes - Primary     States that she is mostly compliant with dietary modifications-did improve her A1c when she was watching her diet after using the sensor last year and may benefit from being on a personal CGM.  She states her insurance covers personal CGM so we will send in a prescription and set her up for teaching         Relevant Orders    Hemoglobin A1C    Comprehensive metabolic panel    Fructosamine    Iron Panel (Includes Ferritin, Iron Sat%, Iron, and TIBC)    Vitamin D deficiency     Has been inconsistent with vitamin D supplementations-advised to take consistently-she likely does not need Drisdol long-term         Relevant Orders    Vitamin D 25 hydroxy      CC: pre Diabetes    History of Present Illness     HPI:  54-year-old female with prediabetes, family history of type 2 diabetes, pancreatic cyst and vitamin D deficiency seen in follow-up    Pancreatic cyst being monitored by surgical oncology    Recent DEXA scan showed osteopenia no history of low trauma fracture  Was on PPI for 3 years and stopped recently and will be seeing GI   Postmenopausal age 52 , never on HRT     ON DRISDOL since June  -however may not be very consistent with that    Calcium in diet                  Review of Systems    Historical Information   Past Medical History:   Diagnosis Date    COVID-19 vaccine series completed     12/19/20 and 1/9/21-in EPIC    GERD (gastroesophageal reflux  disease)     Pancreatic cyst 2024    checked every 6 months    Upper respiratory tract infection 03/23/2020    She does not meet criteria for covid testing, although I feel she should not return to work at this time until her symptoms have improved.      Vitamin D deficiency      Past Surgical History:   Procedure Laterality Date    DENTAL SURGERY  05/2017    abscess     Social History   Social History     Substance and Sexual Activity   Alcohol Use Yes    Alcohol/week: 1.0 standard drink of alcohol    Types: 1 Glasses of wine per week    Comment: social     Social History     Substance and Sexual Activity   Drug Use Never     Social History     Tobacco Use   Smoking Status Never   Smokeless Tobacco Never     Family History:   Family History   Problem Relation Age of Onset    Diabetes Mother     Hypertension Mother     Thyroid nodules Mother     Diabetes Father     Hypertension Father     Tuberculosis Father     Cancer Father         bladder    No Known Problems Maternal Aunt     No Known Problems Maternal Aunt     No Known Problems Maternal Aunt     No Known Problems Maternal Aunt     No Known Problems Paternal Aunt     No Known Problems Paternal Aunt     No Known Problems Paternal Aunt        Meds/Allergies   Current Outpatient Medications   Medication Sig Dispense Refill    ergocalciferol (VITAMIN D2) 50,000 units take 1 capsule by mouth every week 12 capsule 0    metFORMIN (GLUCOPHAGE-XR) 500 mg 24 hr tablet Take 1 tablet (500 mg total) by mouth daily with breakfast (Patient taking differently: Take 500 mg by mouth daily with breakfast Has prescription, waiting to get opinion from Joan wilks If she should take it.) 90 tablet 1    Continuous Glucose  (Dexcom G7 ) MARGE Use 1 applicator 1 (one) time for 1 dose 1 each 3    Continuous Glucose Sensor (Dexcom G7 Sensor) Use 1 Device every 10 days 12 each 1     No current facility-administered medications for this visit.     Allergies   Allergen Reactions  "   Omnipaque [Iohexol] Hives, Vomiting and Blisters     Patient received contrast for the 1st time today 10/03/2022. Visible raised skin rash on abdomen, flank, back, and arms. Fluid filled blisters and erythema on bilateral lower legs. Also had vomiting immediately post contrast injection. She received 100mL of omnipaque during study. GEOFF [facility] RN     Iodinated Contrast Media Vomiting       Objective   Vitals: Blood pressure 100/64, pulse 64, height 5' 1\" (1.549 m), weight 46.3 kg (102 lb), last menstrual period 05/23/2021, SpO2 99%.    Physical Exam    The history was obtained from the review of the chart, patient.    Lab Results:   Lab Results   Component Value Date/Time    Hemoglobin A1C 6.2 (H) 10/12/2024 10:10 AM    Hemoglobin A1C 6.4 (H) 06/14/2024 08:17 AM    Hemoglobin A1C 5.8 (H) 01/30/2024 08:41 AM    White Blood Cell Count 3.2 (L) 10/12/2024 10:10 AM    White Blood Cell Count 3.9 06/14/2024 08:17 AM    White Blood Cell Count 3.2 (L) 01/30/2024 08:41 AM    Hemoglobin 13.4 10/12/2024 10:10 AM    Hemoglobin 12.9 06/14/2024 08:17 AM    Hemoglobin 13.7 01/30/2024 08:41 AM    HCT 41.5 10/12/2024 10:10 AM    HCT 39.1 06/14/2024 08:17 AM    HCT 40.8 01/30/2024 08:41 AM    MCV 92.6 10/12/2024 10:10 AM    MCV 89.9 06/14/2024 08:17 AM    MCV 89.3 01/30/2024 08:41 AM    Platelet Count 238 10/12/2024 10:10 AM    Platelet Count 221 06/14/2024 08:17 AM    Platelet Count 294 01/30/2024 08:41 AM    BUN 15 10/12/2024 10:10 AM    BUN 18 06/14/2024 08:17 AM    BUN 14 01/30/2024 08:41 AM    Potassium 4.2 10/12/2024 10:10 AM    Potassium 4.2 06/14/2024 08:17 AM    Potassium 4.2 01/30/2024 08:41 AM    Chloride 104 10/12/2024 10:10 AM    Chloride 104 06/14/2024 08:17 AM    Chloride 104 01/30/2024 08:41 AM    CO2 28 10/12/2024 10:10 AM    CO2 27 06/14/2024 08:17 AM    CO2 29 01/30/2024 08:41 AM    Creatinine 0.63 10/12/2024 10:10 AM    Creatinine 0.61 06/14/2024 08:17 AM    Creatinine 0.70 01/30/2024 08:41 AM    AST 23 " 10/12/2024 10:10 AM    AST 26 06/14/2024 08:17 AM    AST 23 01/30/2024 08:41 AM    ALT 20 10/12/2024 10:10 AM    ALT 30 (H) 06/14/2024 08:17 AM    ALT 26 01/30/2024 08:41 AM    Protein, Total 6.9 10/12/2024 10:10 AM    Protein, Total 7.1 06/14/2024 08:17 AM    Protein, Total 7.1 01/30/2024 08:41 AM    Albumin 4.3 10/12/2024 10:10 AM    Albumin 4.5 06/14/2024 08:17 AM    Albumin 4.4 01/30/2024 08:41 AM    Globulin 2.6 10/12/2024 10:10 AM    Globulin 2.6 06/14/2024 08:17 AM    Globulin 2.7 01/30/2024 08:41 AM    HDL 91 10/12/2024 10:10 AM    HDL 77 06/14/2024 08:17 AM    HDL 70 01/30/2024 08:41 AM    Triglycerides 41 10/12/2024 10:10 AM    Triglycerides 50 06/14/2024 08:17 AM    Triglycerides 52 01/30/2024 08:41 AM           Imaging Studies: Results Review Statement: I reviewed radiology reports from this admission including: DEXA scan.  Study Result    Narrative & Impression   CENTRAL  DXA SCAN     CLINICAL HISTORY: 53-year-old postmenopausal female.  OTHER RISK FACTORS: PPI therapy.     PHARMACOLOGIC THERAPY FOR OSTEOPOROSIS: None.     TECHNIQUE: Bone densitometry was performed using a Satellier bone densitometer.  Regions of interest appear properly placed.     COMPARISON: There are no prior DXA studies performed on this unit for comparison.     RESULTS:     LUMBAR SPINE  Level: L1-L4:  BMD: 1.086 gm/cm2  T-score: -0.9        LEFT TOTAL HIP:  BMD: 0.825 gm/cm2  T-score: -1.5     LEFT FEMORAL NECK:  BMD: 0.832 gm/cm2  T score: -1.5     RIGHT TOTAL HIP:  BMD: 0.822 gm/cm2  T-score: -1.5     RIGHT FEMORAL NECK:  BMD: 0.767 gm/cm2  T score: -2.0        IMPRESSION:  1. Low bone mass (osteopenia).     2.  The 10 year risk of hip fracture is 0.6% with the 10 year risk of major osteoporotic fracture being 2.7% as calculated by the [employer] fracture risk assessment tool (FRAX, which is based on data generated by the WHO Collaborating   [place] [facility] Metabolic Bone Diseases).        Portions of the record may  "have been created with voice recognition software. Occasional wrong word or \"sound a like\" substitutions may have occurred due to the inherent limitations of voice recognition software. Read the chart carefully and recognize, using context, where substitutions have occurred.    "

## 2024-11-01 DIAGNOSIS — R73.03 PREDIABETES: Primary | ICD-10-CM

## 2024-11-01 RX ORDER — ACYCLOVIR 400 MG/1
1 TABLET ORAL
Qty: 12 EACH | Refills: 1 | Status: SHIPPED | OUTPATIENT
Start: 2024-11-01

## 2024-11-01 RX ORDER — ACYCLOVIR 400 MG/1
1 TABLET ORAL ONCE
Qty: 1 EACH | Refills: 3 | Status: SHIPPED | OUTPATIENT
Start: 2024-11-01 | End: 2024-11-01

## 2024-11-01 NOTE — ASSESSMENT & PLAN NOTE
States that she is mostly compliant with dietary modifications-did improve her A1c when she was watching her diet after using the sensor last year and may benefit from being on a personal CGM.  She states her insurance covers personal CGM so we will send in a prescription and set her up for teaching

## 2024-11-01 NOTE — ASSESSMENT & PLAN NOTE
Has been inconsistent with vitamin D supplementations-advised to take consistently-she likely does not need Drisdol long-term

## 2024-11-01 NOTE — ASSESSMENT & PLAN NOTE
Advised to make sure she is getting calcium 1200 mg daily in diet or supplementations-fall prevention.  Repeat DEXA in 2 years continue vitamin D supplementations

## 2024-11-07 ENCOUNTER — ANNUAL EXAM (OUTPATIENT)
Dept: OBGYN CLINIC | Facility: CLINIC | Age: 54
End: 2024-11-07
Payer: COMMERCIAL

## 2024-11-07 ENCOUNTER — OFFICE VISIT (OUTPATIENT)
Dept: DIABETES SERVICES | Facility: HOSPITAL | Age: 54
End: 2024-11-07
Payer: COMMERCIAL

## 2024-11-07 VITALS
WEIGHT: 106 LBS | DIASTOLIC BLOOD PRESSURE: 60 MMHG | BODY MASS INDEX: 20.01 KG/M2 | SYSTOLIC BLOOD PRESSURE: 100 MMHG | HEIGHT: 61 IN

## 2024-11-07 DIAGNOSIS — Z12.31 ENCOUNTER FOR SCREENING MAMMOGRAM FOR MALIGNANT NEOPLASM OF BREAST: ICD-10-CM

## 2024-11-07 DIAGNOSIS — Z01.419 ENCOUNTER FOR ANNUAL ROUTINE GYNECOLOGICAL EXAMINATION: Primary | ICD-10-CM

## 2024-11-07 DIAGNOSIS — R73.03 PREDIABETES: Primary | ICD-10-CM

## 2024-11-07 PROCEDURE — 95249 CONT GLUC MNTR PT PROV EQP: CPT | Performed by: DIETITIAN, REGISTERED

## 2024-11-07 PROCEDURE — S0612 ANNUAL GYNECOLOGICAL EXAMINA: HCPCS | Performed by: OBSTETRICS & GYNECOLOGY

## 2024-11-07 PROCEDURE — G0145 SCR C/V CYTO,THINLAYER,RESCR: HCPCS | Performed by: OBSTETRICS & GYNECOLOGY

## 2024-11-07 RX ORDER — MULTIVITAMIN
1 CAPSULE ORAL DAILY
COMMUNITY

## 2024-11-07 NOTE — PROGRESS NOTES
Dexcom G7 Personal Training    Met with Stefany Medina for Dexcom G7 personal training. Patient comes in today with there own unit to be trained on. Completed all aspects of training, including site selection on rotation, not infusing insulin near the sensor site, proper insertion technique, inserting codes into the , charging, waterproof sensor, range of 20ft, setting high low alarms that can be adjusted based on their preferences. They put on their first sensor by themselves with no issue.  Left my office today with sensor on and in 30 min warm up mode.      Stefany Medina will be running the dexcom through their phone.    Discussed creating a Clarity account to be able to link and upload from home or auto upload from their phone. Patient was added to our clarity account today while in office and invited to link to us if using their phone. Patient understands that reciever will be downloaded while in office at time of visit and/or cell phone will automatically upload to our clarity for them. They understand that their blood sugars are not monitored by us on a regular basis, but that we can access them as needed or desired by the patient and provider.     Dexcom's phone number is in their paperwork, encouraged Stefany to reach out to Dexcom if they have any issues after hours, 24/7. Training completed, will call with questions.     Lab Results   Component Value Date    HGBA1C 6.2 (H) 10/12/2024       Lab Results   Component Value Date     05/13/2017    SODIUM 141 10/12/2024    K 4.2 10/12/2024     10/12/2024    CO2 28 10/12/2024    BUN 15 10/12/2024    CREATININE 0.63 10/12/2024    GLUC 100 (H) 10/12/2024    CALCIUM 9.3 10/12/2024    AST 23 10/12/2024    ALT 20 10/12/2024    ALKPHOS 65 10/12/2024    PROT 6.8 05/13/2017    TP 6.9 10/12/2024    BILITOT 0.5 05/13/2017    TBILI 0.5 10/12/2024    EGFR 106 10/12/2024           Patient response to instruction    Comprehension: good  Motivation: good  Expected  Compliance: good  Response to Teachback: 100%, demonstrated understanding    Thank you for referring your patient to [facility], it was a pleasure working with them today. Please feel free to call with any questions or concerns.

## 2024-11-07 NOTE — PROGRESS NOTES
Ambulatory Visit  Name: Stefany Medina      : 1970      MRN: [id number]  Encounter Provider: Kate Cameron DO  Encounter Date: 2024   Encounter department: [facility]    Assessment & Plan  Encounter for annual routine gynecological examination         Encounter for screening mammogram for malignant neoplasm of breast    Orders:    Mammo screening bilateral w 3d and cad; Future    Pap smear done for cytology, reflex HPV.  Encouraged self breast examination as well as calcium supplementation.  Continue annual mammogram.  Reviewed colon cancer screening, up to date.  She will continue to follow-up with primary care and subspecialist as scheduled.  Return to office in 1 year or as needed    History of Present Illness     Stefany Medina is a 54 y.o. female who presents     This is a pleasant 54-year-old female G0 presents for her GYN exam.  She went through menopause at age 51.  She is never been on HRT.  She denies any hot flashes or night sweats.  She denies any vaginal bleeding or spotting.  There were no changes in bowel bladder function.  Patient is  has not been sexually active.  She expresses concerns regarding future sexual activity, vaginal dryness.  We discussed over-the-counter agents versus vaginal estrogen.  She does have a history of multifibroid uterus, small, asymptomatic.    she does follow-up with her family physician and endocrinology on a regular basis.      Endocrinology  Prediabetic, osteopenia  Surgical oncology history of pancreatic cysts 3.5 cm  Cardiology positive stress test, workup negative  GI        2023 DEXA scan osteopenia    2023 pelvic ultrasound, 3.8 cm subserosal fibroid, 1.9 cm anterior fibroid additional smaller fibroids stable, stripe 2 mm, normal ovaries    2024 mammogram    Colonoscopy age 51 follow-up 5 years    2024 helix test negative    History obtained from : patient  Review of Systems   Constitutional:  Negative for fatigue, fever and  "unexpected weight change.   Respiratory:  Negative for cough, chest tightness, shortness of breath and wheezing.    Cardiovascular: Negative.  Negative for chest pain and palpitations.   Gastrointestinal: Negative.  Negative for abdominal distention, abdominal pain, blood in stool, constipation, diarrhea, nausea and vomiting.   Genitourinary: Negative.  Negative for difficulty urinating, dyspareunia, dysuria, flank pain, frequency, genital sores, hematuria, pelvic pain, urgency, vaginal bleeding, vaginal discharge and vaginal pain.   Skin:  Negative for rash.     Current Outpatient Medications on File Prior to Visit   Medication Sig Dispense Refill    ergocalciferol (VITAMIN D2) 50,000 units take 1 capsule by mouth every week 12 capsule 0    Multiple Vitamin (multivitamin) capsule Take 1 capsule by mouth daily      Continuous Glucose Sensor (Dexcom G7 Sensor) Use 1 Device every 10 days (Patient not taking: Reported on 11/7/2024) 12 each 1    metFORMIN (GLUCOPHAGE-XR) 500 mg 24 hr tablet Take 1 tablet (500 mg total) by mouth daily with breakfast (Patient not taking: Reported on 11/7/2024) 90 tablet 1     No current facility-administered medications on file prior to visit.          Objective     /60   Ht 5' 1\" (1.549 m)   Wt 48.1 kg (106 lb)   LMP 05/23/2021 (Exact Date)   BMI 20.03 kg/m²     Physical Exam  Constitutional:       Appearance: Normal appearance. She is well-developed.   HENT:      Head: Normocephalic and atraumatic.   Cardiovascular:      Rate and Rhythm: Normal rate and regular rhythm.   Pulmonary:      Effort: Pulmonary effort is normal.      Breath sounds: Normal breath sounds.   Chest:   Breasts:     Right: No inverted nipple, mass, nipple discharge, skin change or tenderness.      Left: No inverted nipple, mass, nipple discharge, skin change or tenderness.   Abdominal:      General: Bowel sounds are normal. There is no distension.      Palpations: Abdomen is soft.      Tenderness: There is " no abdominal tenderness. There is no guarding or rebound.   Genitourinary:     Labia:         Right: No rash, tenderness or lesion.         Left: No rash, tenderness or lesion.       Vagina: Normal. No signs of injury. No vaginal discharge or tenderness.      Cervix: No cervical motion tenderness, discharge, friability, lesion or cervical bleeding.      Uterus: Not enlarged and not tender.       Adnexa:         Right: No mass, tenderness or fullness.          Left: No mass, tenderness or fullness.     Neurological:      Mental Status: She is alert.   Psychiatric:         Behavior: Behavior normal.   External genitalia is within normal limits.  The vagina is evident of slight estrogen deficiency.  Cervix is small nulliparous.  No pelvic floor prolapse.    Administrative Statements   I have spent a total time of 30 minutes in caring for this patient on the day of the visit/encounter including Risks and benefits of tx options, Instructions for management, Impressions, Counseling / Coordination of care, Documenting in the medical record, Reviewing / ordering tests, medicine, procedures  , and Obtaining or reviewing history  .

## 2024-11-12 DIAGNOSIS — R73.03 PREDIABETES: ICD-10-CM

## 2024-11-12 NOTE — TELEPHONE ENCOUNTER
Reason for call:   [x] Refill   [] Prior Auth  [] Other:     Office:   [x] PCP/Provider - Desi Hurt/[facility]    [] Specialty/Provider -     Medication: Metformin    Dose/Frequency: 500 mg 24 hr tablet    Quantity: #90    Pharmacy: [facility]    Does the patient have enough for 3 days?   [x] Yes   [] No - Send as HP to POD

## 2024-11-13 RX ORDER — METFORMIN HYDROCHLORIDE 500 MG/1
500 TABLET, EXTENDED RELEASE ORAL
Qty: 90 TABLET | Refills: 1 | Status: SHIPPED | OUTPATIENT
Start: 2024-11-13

## 2024-11-15 LAB
LAB AP GYN PRIMARY INTERPRETATION: NORMAL
Lab: NORMAL

## 2024-12-05 ENCOUNTER — RESULTS FOLLOW-UP (OUTPATIENT)
Dept: FAMILY MEDICINE CLINIC | Facility: CLINIC | Age: 54
End: 2024-12-05

## 2024-12-05 LAB
BASOPHILS # BLD AUTO: 78 CELLS/UL (ref 0–200)
BASOPHILS NFR BLD AUTO: 1.7 %
EOSINOPHIL # BLD AUTO: 340 CELLS/UL (ref 15–500)
EOSINOPHIL NFR BLD AUTO: 7.4 %
ERYTHROCYTE [DISTWIDTH] IN BLOOD BY AUTOMATED COUNT: 12.9 % (ref 11–15)
HCT VFR BLD AUTO: 41.3 % (ref 35–45)
HGB BLD-MCNC: 13.6 G/DL (ref 11.7–15.5)
LYMPHOCYTES # BLD AUTO: 1638 CELLS/UL (ref 850–3900)
LYMPHOCYTES NFR BLD AUTO: 35.6 %
MCH RBC QN AUTO: 30.2 PG (ref 27–33)
MCHC RBC AUTO-ENTMCNC: 32.9 G/DL (ref 32–36)
MCV RBC AUTO: 91.8 FL (ref 80–100)
MONOCYTES # BLD AUTO: 239 CELLS/UL (ref 200–950)
MONOCYTES NFR BLD AUTO: 5.2 %
NEUTROPHILS # BLD AUTO: 2305 CELLS/UL (ref 1500–7800)
NEUTROPHILS NFR BLD AUTO: 50.1 %
PLATELET # BLD AUTO: 254 THOUSAND/UL (ref 140–400)
PMV BLD REES-ECKER: 10.9 FL (ref 7.5–12.5)
RBC # BLD AUTO: 4.5 MILLION/UL (ref 3.8–5.1)
WBC # BLD AUTO: 4.6 THOUSAND/UL (ref 3.8–10.8)

## 2025-01-13 ENCOUNTER — PREP FOR PROCEDURE (OUTPATIENT)
Dept: GASTROENTEROLOGY | Facility: AMBULARY SURGERY CENTER | Age: 55
End: 2025-01-13

## 2025-01-13 DIAGNOSIS — K22.70 BARRETT'S ESOPHAGUS WITHOUT DYSPLASIA: Primary | ICD-10-CM

## 2025-01-13 RX ORDER — SODIUM CHLORIDE, SODIUM LACTATE, POTASSIUM CHLORIDE, CALCIUM CHLORIDE 600; 310; 30; 20 MG/100ML; MG/100ML; MG/100ML; MG/100ML
125 INJECTION, SOLUTION INTRAVENOUS CONTINUOUS
OUTPATIENT
Start: 2025-01-13

## 2025-01-22 ENCOUNTER — TELEPHONE (OUTPATIENT)
Age: 55
End: 2025-01-22

## 2025-01-22 NOTE — TELEPHONE ENCOUNTER
01/22/25  Screened by: Yas Thomas    Referring Provider Adarsh    Pre- Screening:     There is no height or weight on file to calculate BMI.20.03  Has patient been referred for a routine screening Colonoscopy? yes  Is the patient between 45-75 years old? yes      Previous EGD yes   If yes:    Date:     Facility:     Reason:           Does the patient want to see a Gastroenterologist prior to their procedure OR are they having any GI symptoms? no    Has the patient been hospitalized or had abdominal surgery in the past 6 months? no    Does the patient use supplemental oxygen? no    Does the patient take Coumadin, Lovenox, Plavix, Elliquis, Xarelto, or other blood thinning medication? no    Has the patient had a stroke, cardiac event, or stent placed in the past year? no      If patient is between 45yrs - 49yrs, please advise patient that we will have to confirm benefits & coverage with their insurance company for a routine screening colonoscopy.

## 2025-01-22 NOTE — TELEPHONE ENCOUNTER
Scheduled date of EGD(as of today):4/24/25  Physician performing EGD:Dr Altamirano   Location of EGD:[facility]  Instructions reviewed with patient by:pt requesting prep instructions be mailed to her address  Clearances: diabetic     EGD order already in the system

## 2025-02-13 LAB
25(OH)D3 SERPL-MCNC: 38 NG/ML (ref 30–100)
ALBUMIN SERPL-MCNC: 4.4 G/DL (ref 3.6–5.1)
ALBUMIN/GLOB SERPL: 1.8 (CALC) (ref 1–2.5)
ALP SERPL-CCNC: 68 U/L (ref 37–153)
ALT SERPL-CCNC: 18 U/L (ref 6–29)
AST SERPL-CCNC: 21 U/L (ref 10–35)
BILIRUB SERPL-MCNC: 0.5 MG/DL (ref 0.2–1.2)
BUN SERPL-MCNC: 11 MG/DL (ref 7–25)
BUN/CREAT SERPL: NORMAL (CALC) (ref 6–22)
CALCIUM SERPL-MCNC: 9.1 MG/DL (ref 8.6–10.4)
CHLORIDE SERPL-SCNC: 106 MMOL/L (ref 98–110)
CO2 SERPL-SCNC: 29 MMOL/L (ref 20–32)
CREAT SERPL-MCNC: 0.59 MG/DL (ref 0.5–1.03)
FERRITIN SERPL-MCNC: 145 NG/ML (ref 16–232)
FRUCTOSAMINE SERPL-SCNC: 238 UMOL/L (ref 205–285)
GFR/BSA.PRED SERPLBLD CYS-BASED-ARV: 108 ML/MIN/1.73M2
GLOBULIN SER CALC-MCNC: 2.4 G/DL (CALC) (ref 1.9–3.7)
GLUCOSE SERPL-MCNC: 96 MG/DL (ref 65–99)
HBA1C MFR BLD: 5.8 % OF TOTAL HGB
IRON SATN MFR SERPL: 25 % (CALC) (ref 16–45)
IRON SERPL-MCNC: 74 MCG/DL (ref 45–160)
POTASSIUM SERPL-SCNC: 4.1 MMOL/L (ref 3.5–5.3)
PROT SERPL-MCNC: 6.8 G/DL (ref 6.1–8.1)
SODIUM SERPL-SCNC: 142 MMOL/L (ref 135–146)
TIBC SERPL-MCNC: 298 MCG/DL (CALC) (ref 250–450)

## 2025-02-24 ENCOUNTER — RESULTS FOLLOW-UP (OUTPATIENT)
Dept: ENDOCRINOLOGY | Facility: CLINIC | Age: 55
End: 2025-02-24

## 2025-04-10 ENCOUNTER — ANESTHESIA EVENT (OUTPATIENT)
Dept: ANESTHESIOLOGY | Facility: HOSPITAL | Age: 55
End: 2025-04-10

## 2025-04-10 ENCOUNTER — ANESTHESIA (OUTPATIENT)
Dept: ANESTHESIOLOGY | Facility: HOSPITAL | Age: 55
End: 2025-04-10

## 2025-04-17 ENCOUNTER — TELEPHONE (OUTPATIENT)
Dept: GASTROENTEROLOGY | Facility: AMBULARY SURGERY CENTER | Age: 55
End: 2025-04-17

## 2025-04-18 ENCOUNTER — TELEPHONE (OUTPATIENT)
Age: 55
End: 2025-04-18

## 2025-04-18 NOTE — TELEPHONE ENCOUNTER
Scheduled date of EGD(as of today): 6/5/25  Physician performing EGD: Dr. Altamirano  Location of EGD: [place]  Instructions reviewed with patient by: Rescheduled Pt has already  Clearances: N/A    Pt called and rescheduled     Advised pt to bring insurance ID card and any copayment. Also to contact insurance co for any insurance or coverage questions

## 2025-04-21 ENCOUNTER — TELEMEDICINE (OUTPATIENT)
Dept: FAMILY MEDICINE CLINIC | Facility: CLINIC | Age: 55
End: 2025-04-21
Payer: COMMERCIAL

## 2025-04-21 ENCOUNTER — TELEPHONE (OUTPATIENT)
Age: 55
End: 2025-04-21

## 2025-04-21 DIAGNOSIS — K86.2 PANCREATIC CYST: ICD-10-CM

## 2025-04-21 DIAGNOSIS — Z12.31 ENCOUNTER FOR SCREENING MAMMOGRAM FOR BREAST CANCER: ICD-10-CM

## 2025-04-21 DIAGNOSIS — Z79.899 ENCOUNTER FOR LONG-TERM CURRENT USE OF MEDICATION: ICD-10-CM

## 2025-04-21 DIAGNOSIS — R92.30 DENSE BREAST: ICD-10-CM

## 2025-04-21 DIAGNOSIS — Z13.0 SCREENING FOR DEFICIENCY ANEMIA: ICD-10-CM

## 2025-04-21 DIAGNOSIS — R73.03 PREDIABETES: Primary | ICD-10-CM

## 2025-04-21 DIAGNOSIS — Z12.31 SCREENING MAMMOGRAM, ENCOUNTER FOR: ICD-10-CM

## 2025-04-21 DIAGNOSIS — E78.00 HYPERCHOLESTEROLEMIA: ICD-10-CM

## 2025-04-21 PROBLEM — R93.5 ABNORMAL ULTRASOUND OF ABDOMEN: Status: RESOLVED | Noted: 2022-09-06 | Resolved: 2025-04-21

## 2025-04-21 PROCEDURE — 99214 OFFICE O/P EST MOD 30 MIN: CPT | Performed by: FAMILY MEDICINE

## 2025-04-21 RX ORDER — ACYCLOVIR 400 MG/1
1 TABLET ORAL
Qty: 12 EACH | Refills: 1 | Status: SHIPPED | OUTPATIENT
Start: 2025-04-21

## 2025-04-21 RX ORDER — METFORMIN HYDROCHLORIDE 500 MG/1
500 TABLET, EXTENDED RELEASE ORAL
Qty: 90 TABLET | Refills: 1 | Status: SHIPPED | OUTPATIENT
Start: 2025-04-21

## 2025-04-21 NOTE — PROGRESS NOTES
Virtual Regular VisitName: Stefany Medina      : 1970      MRN: [id number]  Encounter Provider: Desi Hurt DO  Encounter Date: 2025   Encounter department: [facility]  :  Assessment & Plan  Prediabetes  Improving with diet and metformin  Continue metformin 500 mg daily  Orders:  •  Hemoglobin A1C With EAG; Future  •  Continuous Glucose Sensor (Dexcom G7 Sensor); Use 1 Device every 10 days  •  metFORMIN (GLUCOPHAGE-XR) 500 mg 24 hr tablet; Take 1 tablet (500 mg total) by mouth daily with breakfast    Encounter for screening mammogram for breast cancer    Orders:  •  Mammo screening bilateral w 3d and cad; Future    Screening mammogram, encounter for    Orders:  •  US breast screening bilateral complete (ABUS); Future    Dense breast    Orders:  •  US breast screening bilateral complete (ABUS); Future    Pancreatic cyst  Improving in size  Will continue with follow-up with [facility]  Reviewed MR cholangiogram done on 3/3/2025       Hypercholesterolemia  Stable  Orders:  •  Comprehensive metabolic panel; Future  •  Lipid Panel with Direct LDL reflex; Future    Screening for deficiency anemia    Orders:  •  CBC; Future    Encounter for long-term current use of medication    Orders:  •  Vitamin B12; Future  •  Magnesium; Future  •  TSH, 3rd generation; Future    Return in about 6 months (around 10/21/2025) for Next scheduled follow up.    History of Present Illness     She went to [facility] for her pancreatic cyst.  It is getting smaller.   She is taking her metformin daily with no diarrhea.       Review of Systems    Objective   LMP 2021 (Exact Date)     Physical Exam  Constitutional:       General: She is not in acute distress.  Pulmonary:      Effort: Pulmonary effort is normal.   Psychiatric:         Behavior: Behavior normal.         Thought Content: Thought content normal.         Judgment: Judgment normal.         Administrative Statements   Encounter provider Desi Hurt  DO    The Patient is located at Home and in the following state in which I hold an active license [place].    The patient was identified by name and date of birth. Stefany Medina was informed that this is a telemedicine visit and that the visit is being conducted through the Epic Embedded platform. She agrees to proceed..  My office door was closed. No one else was in the room.  She acknowledged consent and understanding of privacy and security of the video platform. The patient has agreed to participate and understands they can discontinue the visit at any time.    I have spent a total time of 12 minutes in caring for this patient on the day of the visit/encounter including Diagnostic results, Instructions for management, Impressions, Documenting in the medical record, and Reviewing/placing orders in the medical record (including tests, medications, and/or procedures), not including the time spent for establishing the audio/video connection.

## 2025-04-21 NOTE — TELEPHONE ENCOUNTER
Pt called in to see if she could move the time of her appointment up today, but at the time of the call provider did not have any other openings.   Pt requested to changed her appointment to telemedicine, stating she doesn't have any concerns, and the appointment is to go over some of her lab results.    Wanted to make provider aware incase she would like to see the pt in person

## 2025-04-21 NOTE — ASSESSMENT & PLAN NOTE
Improving in size  Will continue with follow-up with [facility]  Reviewed MR cholangiogram done on 3/3/2025     
Improving with diet and metformin  Continue metformin 500 mg daily  Orders:  •  Hemoglobin A1C With EAG; Future  •  Continuous Glucose Sensor (Dexcom G7 Sensor); Use 1 Device every 10 days  •  metFORMIN (GLUCOPHAGE-XR) 500 mg 24 hr tablet; Take 1 tablet (500 mg total) by mouth daily with breakfast  
Stable  Orders:  •  Comprehensive metabolic panel; Future  •  Lipid Panel with Direct LDL reflex; Future  
29-Dec-2020 15:54

## 2025-04-23 ENCOUNTER — TELEPHONE (OUTPATIENT)
Age: 55
End: 2025-04-23

## 2025-04-23 DIAGNOSIS — R73.03 PREDIABETES: ICD-10-CM

## 2025-04-23 RX ORDER — ACYCLOVIR 400 MG/1
1 TABLET ORAL
Qty: 12 EACH | Refills: 0 | OUTPATIENT
Start: 2025-04-23

## 2025-04-23 NOTE — TELEPHONE ENCOUNTER
PA for dexcom g7 sensor SUBMITTED to [employer]     via    [x]CMM-KEY: [id number]  []Surescripts-Case ID #   []Availity-Auth ID # NDC #   []Faxed to plan   []Other website   []Phone call Case ID #     []PA sent as URGENT    All office notes, labs and other pertaining documents and studies sent. Clinical questions answered. Awaiting determination from insurance company.     Turnaround time for your insurance to make a decision on your Prior Authorization can take 7-21 business days.

## 2025-04-28 ENCOUNTER — TELEPHONE (OUTPATIENT)
Age: 55
End: 2025-04-28

## 2025-04-28 NOTE — TELEPHONE ENCOUNTER
Pt is requesting a letter fo medical necessity for her labs. So she can appeal her lab orders. They are being denied.

## 2025-04-28 NOTE — TELEPHONE ENCOUNTER
Randal from [facility] called. To request we mail all lab results from 2/12/25 to the pts home. The pt will then forward to BS as there is an appeal started for her already

## 2025-04-28 NOTE — TELEPHONE ENCOUNTER
PA for  dexcom g 7 sensor  DENIED    Reason:(Screenshot if applicable)        Message sent to office clinical pool Yes    Denial letter scanned into Media Yes    Appeal started No (Provider will need to decide if appeal is warranted and send clinical documentation to Prior Authorization Team for initiation.)    **Please follow up with your patient regarding denial and next steps**

## 2025-05-01 NOTE — TELEPHONE ENCOUNTER
[facility] calling on behalf of patient regarding letter of medical necessity. Made aware letter will be emailed and mailed to patient today.

## 2025-05-05 DIAGNOSIS — R73.03 PREDIABETES: Primary | ICD-10-CM

## 2025-05-05 RX ORDER — BLOOD-GLUCOSE METER
EACH MISCELLANEOUS
Qty: 1 KIT | Refills: 0 | Status: SHIPPED | OUTPATIENT
Start: 2025-05-05

## 2025-05-05 RX ORDER — LANCETS 33 GAUGE
EACH MISCELLANEOUS
Qty: 100 EACH | Refills: 3 | Status: SHIPPED | OUTPATIENT
Start: 2025-05-05

## 2025-05-05 RX ORDER — LANCETS 33 GAUGE
EACH MISCELLANEOUS
Qty: 100 EACH | Refills: 3 | Status: SHIPPED | OUTPATIENT
Start: 2025-05-05 | End: 2025-05-05 | Stop reason: SDUPTHER

## 2025-05-05 RX ORDER — BLOOD-GLUCOSE METER
EACH MISCELLANEOUS
Qty: 1 KIT | Refills: 0 | Status: SHIPPED | OUTPATIENT
Start: 2025-05-05 | End: 2025-05-05 | Stop reason: SDUPTHER

## 2025-05-05 RX ORDER — BLOOD SUGAR DIAGNOSTIC
STRIP MISCELLANEOUS
Qty: 100 EACH | Refills: 3 | Status: SHIPPED | OUTPATIENT
Start: 2025-05-05 | End: 2025-05-05 | Stop reason: SDUPTHER

## 2025-05-05 RX ORDER — BLOOD SUGAR DIAGNOSTIC
STRIP MISCELLANEOUS
Qty: 100 EACH | Refills: 3 | Status: SHIPPED | OUTPATIENT
Start: 2025-05-05

## 2025-05-22 ENCOUNTER — ANESTHESIA (OUTPATIENT)
Dept: ANESTHESIOLOGY | Facility: HOSPITAL | Age: 55
End: 2025-05-22

## 2025-05-22 ENCOUNTER — ANESTHESIA EVENT (OUTPATIENT)
Dept: ANESTHESIOLOGY | Facility: HOSPITAL | Age: 55
End: 2025-05-22

## 2025-06-05 ENCOUNTER — ANESTHESIA (OUTPATIENT)
Dept: GASTROENTEROLOGY | Facility: AMBULARY SURGERY CENTER | Age: 55
End: 2025-06-05
Payer: COMMERCIAL

## 2025-06-05 ENCOUNTER — HOSPITAL ENCOUNTER (OUTPATIENT)
Dept: GASTROENTEROLOGY | Facility: AMBULARY SURGERY CENTER | Age: 55
Setting detail: OUTPATIENT SURGERY
End: 2025-06-05
Attending: INTERNAL MEDICINE
Payer: COMMERCIAL

## 2025-06-05 VITALS
DIASTOLIC BLOOD PRESSURE: 59 MMHG | OXYGEN SATURATION: 100 % | TEMPERATURE: 98.3 F | RESPIRATION RATE: 18 BRPM | SYSTOLIC BLOOD PRESSURE: 102 MMHG | HEART RATE: 82 BPM

## 2025-06-05 DIAGNOSIS — K22.70 BARRETT'S ESOPHAGUS WITHOUT DYSPLASIA: ICD-10-CM

## 2025-06-05 PROCEDURE — 88305 TISSUE EXAM BY PATHOLOGIST: CPT | Performed by: PATHOLOGY

## 2025-06-05 PROCEDURE — 43239 EGD BIOPSY SINGLE/MULTIPLE: CPT | Performed by: INTERNAL MEDICINE

## 2025-06-05 RX ORDER — SODIUM CHLORIDE, SODIUM LACTATE, POTASSIUM CHLORIDE, CALCIUM CHLORIDE 600; 310; 30; 20 MG/100ML; MG/100ML; MG/100ML; MG/100ML
INJECTION, SOLUTION INTRAVENOUS CONTINUOUS PRN
Status: DISCONTINUED | OUTPATIENT
Start: 2025-06-05 | End: 2025-06-05

## 2025-06-05 RX ORDER — PROPOFOL 10 MG/ML
INJECTION, EMULSION INTRAVENOUS AS NEEDED
Status: DISCONTINUED | OUTPATIENT
Start: 2025-06-05 | End: 2025-06-05

## 2025-06-05 RX ORDER — LIDOCAINE HYDROCHLORIDE 20 MG/ML
INJECTION, SOLUTION EPIDURAL; INFILTRATION; INTRACAUDAL; PERINEURAL AS NEEDED
Status: DISCONTINUED | OUTPATIENT
Start: 2025-06-05 | End: 2025-06-05

## 2025-06-05 RX ORDER — SODIUM CHLORIDE, SODIUM LACTATE, POTASSIUM CHLORIDE, CALCIUM CHLORIDE 600; 310; 30; 20 MG/100ML; MG/100ML; MG/100ML; MG/100ML
125 INJECTION, SOLUTION INTRAVENOUS CONTINUOUS
Status: DISCONTINUED | OUTPATIENT
Start: 2025-06-05 | End: 2025-06-09 | Stop reason: HOSPADM

## 2025-06-05 RX ORDER — EPHEDRINE SULFATE 50 MG/ML
INJECTION INTRAVENOUS AS NEEDED
Status: DISCONTINUED | OUTPATIENT
Start: 2025-06-05 | End: 2025-06-05

## 2025-06-05 RX ADMIN — SODIUM CHLORIDE, SODIUM LACTATE, POTASSIUM CHLORIDE, AND CALCIUM CHLORIDE: .6; .31; .03; .02 INJECTION, SOLUTION INTRAVENOUS at 07:27

## 2025-06-05 RX ADMIN — PROPOFOL 30 MG: 10 INJECTION, EMULSION INTRAVENOUS at 07:41

## 2025-06-05 RX ADMIN — EPHEDRINE SULFATE 10 MG: 50 INJECTION, SOLUTION INTRAVENOUS at 07:46

## 2025-06-05 RX ADMIN — PROPOFOL 40 MG: 10 INJECTION, EMULSION INTRAVENOUS at 07:39

## 2025-06-05 RX ADMIN — PROPOFOL 150 MG: 10 INJECTION, EMULSION INTRAVENOUS at 07:37

## 2025-06-05 RX ADMIN — LIDOCAINE HYDROCHLORIDE 60 MG: 20 INJECTION, SOLUTION EPIDURAL; INFILTRATION; INTRACAUDAL; PERINEURAL at 07:37

## 2025-06-05 NOTE — ANESTHESIA POSTPROCEDURE EVALUATION
Post-Op Assessment Note    CV Status:  Stable  Pain Score: 0    Pain management: adequate       Mental Status:  Sleepy   Hydration Status:  Stable   PONV Controlled:  None   Airway Patency:  Patent  Two or more mitigation strategies used for obstructive sleep apnea   Post Op Vitals Reviewed: Yes    No anethesia notable event occurred.    Staff: CRNA           Last Filed PACU Vitals:  Vitals Value Taken Time   Temp     Pulse 75    /77    Resp 16    SpO2 99

## 2025-06-05 NOTE — H&P
History and Physical -  [facility]  Stefany Medina 55 y.o. female MRN: [id number]    HPI: Stefnay Medina is a 55 y.o. year old female who presents for evaluation of GERD      Review of Systems    Historical Information   Past Medical History[1]  Past Surgical History[2]  Social History   Social History     Substance and Sexual Activity   Alcohol Use Yes    Alcohol/week: 1.0 standard drink of alcohol    Types: 1 Glasses of wine per week    Comment: social     Social History     Substance and Sexual Activity   Drug Use Never     Tobacco Use History[3]  Family History[4]    Meds/Allergies     Not in a hospital admission.    Allergies[5]    Objective     /61   Pulse 76   Temp 98.3 °F (36.8 °C) (Temporal)   Resp 16   LMP 05/23/2021 (Exact Date)   SpO2 98%       PHYSICAL EXAM    Gen: NAD  CV: RRR  CHEST: Clear  ABD: soft, NT/ND  EXT: no edema  Neuro: AAO      ASSESSMENT/PLAN:  This is a 55 y.o. year old female here for evaluation of chronic GERD.    PLAN:   Procedure: EGD.           [1]   Past Medical History:  Diagnosis Date    COVID-19 vaccine series completed     12/19/20 and 1/9/21-in [facility]    GERD (gastroesophageal reflux disease)     Pancreatic cyst 2024    checked every 6 months    Upper respiratory tract infection 03/23/2020    She does not meet criteria for covid testing, although I feel she should not return to work at this time until her symptoms have improved.      Vitamin D deficiency    [2]   Past Surgical History:  Procedure Laterality Date    DENTAL SURGERY  05/2017    abscess   [3]   Social History  Tobacco Use   Smoking Status Never   Smokeless Tobacco Never   [4]   Family History  Problem Relation Name Age of Onset    Diabetes Mother      Hypertension Mother      Thyroid nodules Mother      Diabetes Father      Hypertension Father      Tuberculosis Father      Cancer Father          bladder    No Known Problems Maternal Aunt      No Known Problems Maternal Aunt      No Known Problems  Maternal Aunt      No Known Problems Maternal Aunt      No Known Problems Paternal Aunt      No Known Problems Paternal Aunt      No Known Problems Paternal Aunt     [5]   Allergies  Allergen Reactions    Omnipaque [Iohexol] Hives, Vomiting and Blisters     Patient received contrast for the 1st time today 10/03/2022. Visible raised skin rash on abdomen, flank, back, and arms. Fluid filled blisters and erythema on bilateral lower legs. Also had vomiting immediately post contrast injection. She received 100mL of omnipaque during study. GEOFF [facility] RN     Iodinated Contrast Media Vomiting

## 2025-06-05 NOTE — ANESTHESIA PREPROCEDURE EVALUATION
Procedure:  EGD    Relevant Problems   ANESTHESIA   (+) Motion sickness      CARDIO   (+) Hypercholesterolemia      GI/HEPATIC   (+) Esophageal reflux   (+) Pancreatic cyst   (+) Pancreatic lesion        Physical Exam    Airway     Mallampati score: II  TM Distance: >3 FB  Neck ROM: full  Upper bite lip test: I  Mouth opening: >= 4 cm      Cardiovascular  Cardiovascular exam normal    Dental   No notable dental hx     Pulmonary  Pulmonary exam normal     Neurological  - normal exam    Other Findings  post-pubertal.      Anesthesia Plan  ASA Score- 2     Anesthesia Type- IV sedation with anesthesia with ASA Monitors.         Additional Monitors:     Airway Plan:            Plan Factors-Exercise tolerance (METS): >4 METS.    Chart reviewed.    Patient summary reviewed.    Patient is not a current smoker.              Induction-     Postoperative Plan- .   Monitoring Plan - Monitoring plan - standard ASA monitoring      Perioperative Resuscitation Plan - Level 1 - Full Code.       Informed Consent- Anesthetic plan and risks discussed with patient.  I personally reviewed this patient with the CRNA. Discussed and agreed on the Anesthesia Plan with the CRNA..      NPO Status:  No vitals data found for the desired time range.

## 2025-06-09 PROCEDURE — 88305 TISSUE EXAM BY PATHOLOGIST: CPT | Performed by: PATHOLOGY

## 2025-06-10 ENCOUNTER — RESULTS FOLLOW-UP (OUTPATIENT)
Age: 55
End: 2025-06-10

## 2025-06-20 DIAGNOSIS — K21.9 GASTROESOPHAGEAL REFLUX DISEASE WITHOUT ESOPHAGITIS: Primary | ICD-10-CM

## 2025-06-20 RX ORDER — OMEPRAZOLE 20 MG/1
20 CAPSULE, DELAYED RELEASE ORAL DAILY
Qty: 90 CAPSULE | Refills: 1 | Status: SHIPPED | OUTPATIENT
Start: 2025-06-20 | End: 2025-09-18

## 2025-06-25 NOTE — TELEPHONE ENCOUNTER
Pt was here today for appt and stated that her ins will cover the Dexcom. She would like the script sent to [facility]. Any questions, please call pt. Thanx  
You may remove the outer dressing in 48 hours. Do not remove the strips of tape underneath, they will fall off on their own. You may then shower, let soap and water run over the tape and pat dry. Do not scrub the surgical area.     Take Tylenol 975mg every 6 hours for pain. A pain medication called oxycodone is being sent to your pharmacy, take 1 tablet every 6 hours for severe pain. Do not drive if you are taking oxycodone.    Follow up with Dr. Llanos in 1-2 weeks after surgery. Call the office to schedule an appointment.

## 2025-07-08 ENCOUNTER — TELEPHONE (OUTPATIENT)
Age: 55
End: 2025-07-08

## 2025-07-08 NOTE — TELEPHONE ENCOUNTER
Patient calling stating that she needs a booster for  yellow fever vaccine. Patient will be traveling soon. She stated that she thinks she got it at the office last time and wanted to know if that can be done again.  Please call patient back

## 2025-07-08 NOTE — TELEPHONE ENCOUNTER
No, that is not something we carry here.  She should contact her pharmacy to see if they carry it otherwise she should contact travel medicine.  Thank you,  Desi Hurt, DO

## 2025-07-09 DIAGNOSIS — Z23 NEED FOR VACCINATION: Primary | ICD-10-CM

## 2025-07-11 ENCOUNTER — TELEPHONE (OUTPATIENT)
Dept: ENDOCRINOLOGY | Facility: CLINIC | Age: 55
End: 2025-07-11

## 2025-08-13 ENCOUNTER — APPOINTMENT (OUTPATIENT)
Dept: RADIOLOGY | Facility: HOSPITAL | Age: 55
End: 2025-08-13
Payer: COMMERCIAL

## 2025-08-13 ENCOUNTER — HOSPITAL ENCOUNTER (OUTPATIENT)
Dept: RADIOLOGY | Facility: HOSPITAL | Age: 55
Discharge: HOME | End: 2025-08-13
Payer: COMMERCIAL